# Patient Record
Sex: FEMALE | Race: WHITE
[De-identification: names, ages, dates, MRNs, and addresses within clinical notes are randomized per-mention and may not be internally consistent; named-entity substitution may affect disease eponyms.]

---

## 2017-05-05 ENCOUNTER — HOSPITAL ENCOUNTER (EMERGENCY)
Dept: HOSPITAL 8 - ED | Age: 52
Discharge: HOME | End: 2017-05-05
Payer: MEDICAID

## 2017-05-05 VITALS — WEIGHT: 293 LBS | BODY MASS INDEX: 53.92 KG/M2 | HEIGHT: 62 IN

## 2017-05-05 VITALS — DIASTOLIC BLOOD PRESSURE: 70 MMHG | SYSTOLIC BLOOD PRESSURE: 131 MMHG

## 2017-05-05 DIAGNOSIS — G51.0: Primary | ICD-10-CM

## 2017-05-05 DIAGNOSIS — K08.9: ICD-10-CM

## 2017-05-05 PROCEDURE — 99284 EMERGENCY DEPT VISIT MOD MDM: CPT

## 2017-08-29 ENCOUNTER — APPOINTMENT (OUTPATIENT)
Dept: OTHER | Facility: IMAGING CENTER | Age: 52
End: 2017-08-29

## 2017-08-29 PROCEDURE — 97530 THERAPEUTIC ACTIVITIES: CPT | Performed by: FAMILY MEDICINE

## 2017-09-12 ENCOUNTER — APPOINTMENT (OUTPATIENT)
Dept: OTHER | Facility: IMAGING CENTER | Age: 52
End: 2017-09-12

## 2017-09-12 PROCEDURE — 97530 THERAPEUTIC ACTIVITIES: CPT | Performed by: FAMILY MEDICINE

## 2017-11-16 ENCOUNTER — APPOINTMENT (OUTPATIENT)
Dept: OTHER | Facility: IMAGING CENTER | Age: 52
End: 2017-11-16

## 2017-11-16 PROCEDURE — 97530 THERAPEUTIC ACTIVITIES: CPT | Performed by: FAMILY MEDICINE

## 2017-12-04 ENCOUNTER — APPOINTMENT (OUTPATIENT)
Dept: OTHER | Facility: IMAGING CENTER | Age: 52
End: 2017-12-04

## 2017-12-04 PROCEDURE — 97530 THERAPEUTIC ACTIVITIES: CPT | Performed by: FAMILY MEDICINE

## 2017-12-11 ENCOUNTER — APPOINTMENT (OUTPATIENT)
Dept: OTHER | Facility: IMAGING CENTER | Age: 52
End: 2017-12-11

## 2017-12-11 PROCEDURE — 97530 THERAPEUTIC ACTIVITIES: CPT | Performed by: FAMILY MEDICINE

## 2018-01-08 ENCOUNTER — APPOINTMENT (OUTPATIENT)
Dept: OTHER | Facility: IMAGING CENTER | Age: 53
End: 2018-01-08

## 2018-01-08 PROCEDURE — 97530 THERAPEUTIC ACTIVITIES: CPT | Performed by: FAMILY MEDICINE

## 2018-01-24 ENCOUNTER — APPOINTMENT (OUTPATIENT)
Dept: OTHER | Facility: IMAGING CENTER | Age: 53
End: 2018-01-24

## 2018-02-08 ENCOUNTER — APPOINTMENT (RX ONLY)
Dept: URBAN - METROPOLITAN AREA CLINIC 31 | Facility: CLINIC | Age: 53
Setting detail: DERMATOLOGY
End: 2018-02-08

## 2018-02-08 DIAGNOSIS — D18.0 HEMANGIOMA: ICD-10-CM

## 2018-02-08 DIAGNOSIS — L82.1 OTHER SEBORRHEIC KERATOSIS: ICD-10-CM

## 2018-02-08 DIAGNOSIS — D22 MELANOCYTIC NEVI: ICD-10-CM

## 2018-02-08 DIAGNOSIS — D17 BENIGN LIPOMATOUS NEOPLASM: ICD-10-CM

## 2018-02-08 PROBLEM — D17.0 BENIGN LIPOMATOUS NEOPLASM OF SKIN AND SUBCUTANEOUS TISSUE OF HEAD, FACE AND NECK: Status: ACTIVE | Noted: 2018-02-08

## 2018-02-08 PROBLEM — D22.62 MELANOCYTIC NEVI OF LEFT UPPER LIMB, INCLUDING SHOULDER: Status: ACTIVE | Noted: 2018-02-08

## 2018-02-08 PROBLEM — D22.5 MELANOCYTIC NEVI OF TRUNK: Status: ACTIVE | Noted: 2018-02-08

## 2018-02-08 PROBLEM — D22.61 MELANOCYTIC NEVI OF RIGHT UPPER LIMB, INCLUDING SHOULDER: Status: ACTIVE | Noted: 2018-02-08

## 2018-02-08 PROBLEM — E13.9 OTHER SPECIFIED DIABETES MELLITUS WITHOUT COMPLICATIONS: Status: ACTIVE | Noted: 2018-02-08

## 2018-02-08 PROBLEM — D17.1 BENIGN LIPOMATOUS NEOPLASM OF SKIN AND SUBCUTANEOUS TISSUE OF TRUNK: Status: ACTIVE | Noted: 2018-02-08

## 2018-02-08 PROBLEM — D17.22 BENIGN LIPOMATOUS NEOPLASM OF SKIN AND SUBCUTANEOUS TISSUE OF LEFT ARM: Status: ACTIVE | Noted: 2018-02-08

## 2018-02-08 PROBLEM — D18.01 HEMANGIOMA OF SKIN AND SUBCUTANEOUS TISSUE: Status: ACTIVE | Noted: 2018-02-08

## 2018-02-08 PROCEDURE — 99243 OFF/OP CNSLTJ NEW/EST LOW 30: CPT

## 2018-02-08 PROCEDURE — ? COUNSELING

## 2018-02-08 ASSESSMENT — LOCATION DETAILED DESCRIPTION DERM
LOCATION DETAILED: RIGHT MEDIAL SUPERIOR CHEST
LOCATION DETAILED: RIGHT INFERIOR POSTERIOR NECK
LOCATION DETAILED: LEFT SUPERIOR PARIETAL SCALP
LOCATION DETAILED: LEFT MEDIAL INFERIOR CHEST
LOCATION DETAILED: RIGHT SUPERIOR MEDIAL LOWER BACK
LOCATION DETAILED: LEFT DISTAL POSTERIOR UPPER ARM
LOCATION DETAILED: LEFT ELBOW
LOCATION DETAILED: RIGHT MEDIAL INFERIOR CHEST
LOCATION DETAILED: LEFT SUPERIOR UPPER BACK
LOCATION DETAILED: RIGHT DISTAL POSTERIOR UPPER ARM

## 2018-02-08 ASSESSMENT — LOCATION SIMPLE DESCRIPTION DERM
LOCATION SIMPLE: LEFT ELBOW
LOCATION SIMPLE: SCALP
LOCATION SIMPLE: LEFT UPPER BACK
LOCATION SIMPLE: RIGHT LOWER BACK
LOCATION SIMPLE: RIGHT POSTERIOR UPPER ARM
LOCATION SIMPLE: LEFT POSTERIOR UPPER ARM
LOCATION SIMPLE: POSTERIOR NECK
LOCATION SIMPLE: CHEST

## 2018-02-08 ASSESSMENT — LOCATION ZONE DERM
LOCATION ZONE: SCALP
LOCATION ZONE: ARM
LOCATION ZONE: TRUNK
LOCATION ZONE: NECK

## 2018-02-22 ENCOUNTER — APPOINTMENT (OUTPATIENT)
Dept: OTHER | Facility: IMAGING CENTER | Age: 53
End: 2018-02-22

## 2020-07-16 ENCOUNTER — HOSPITAL ENCOUNTER (EMERGENCY)
Facility: MEDICAL CENTER | Age: 55
End: 2020-07-16
Attending: EMERGENCY MEDICINE
Payer: MEDICAID

## 2020-07-16 VITALS
BODY MASS INDEX: 53.92 KG/M2 | TEMPERATURE: 97 F | OXYGEN SATURATION: 92 % | DIASTOLIC BLOOD PRESSURE: 79 MMHG | WEIGHT: 293 LBS | HEART RATE: 98 BPM | HEIGHT: 62 IN | SYSTOLIC BLOOD PRESSURE: 136 MMHG | RESPIRATION RATE: 18 BRPM

## 2020-07-16 DIAGNOSIS — K42.9 UMBILICAL HERNIA WITHOUT OBSTRUCTION AND WITHOUT GANGRENE: ICD-10-CM

## 2020-07-16 PROCEDURE — 99284 EMERGENCY DEPT VISIT MOD MDM: CPT

## 2020-07-16 RX ORDER — GLIPIZIDE 5 MG/1
5 TABLET, FILM COATED, EXTENDED RELEASE ORAL DAILY
Status: SHIPPED | COMMUNITY
End: 2022-10-13

## 2020-07-16 RX ORDER — ACYCLOVIR 400 MG/1
400 TABLET ORAL 2 TIMES DAILY
COMMUNITY

## 2020-07-16 RX ORDER — ATORVASTATIN CALCIUM 20 MG/1
20 TABLET, FILM COATED ORAL NIGHTLY
Status: SHIPPED | COMMUNITY
End: 2021-10-05

## 2020-07-16 RX ORDER — MULTIVITAMIN WITH IRON
8000 TABLET ORAL DAILY
Status: SHIPPED | COMMUNITY
End: 2022-09-05

## 2020-07-16 SDOH — HEALTH STABILITY: MENTAL HEALTH: HOW OFTEN DO YOU HAVE A DRINK CONTAINING ALCOHOL?: MONTHLY OR LESS

## 2020-07-16 NOTE — ED PROVIDER NOTES
ED Provider Note    CHIEF COMPLAINT  Chief Complaint   Patient presents with   • Abdominal Pain   • Hernia       HPI  Jordyn Rick is a 54 y.o. female who presents to the emergency department with a history of morbid obesity.  She states she has been watching her diet and has lost 100 pounds.  She has a known umbilical hernia which she has seen a surgeon for in the past and was told she would need to have a gastric bypass in combination with the hernia repair as just repairing the hernia itself at this point would not be beneficial.  She states she has had this for years she has no abdominal pain or vomiting from it.  She does state that she has veins on her hernia and 1 of them broke open and started to bleed.  She states within the last several weeks she was tested for her liver which was unremarkable by her primary care physician and only diagnosis was fatty liver.  She put a Band-Aid on which stopped the bleeding but it still is oozing and she came into the ER for this reason    REVIEW OF SYSTEMS  Positive for abdominal hernia with bleeding vein, Negative for vomiting fever chills nausea and all other systems are negative  PAST MEDICAL HISTORY   has a past medical history of Bell's palsy, Diabetes (HCC), Gout, and Shingles of eyelid.    SOCIAL HISTORY  Social History     Tobacco Use   • Smoking status: Never Smoker   • Smokeless tobacco: Never Used   Substance and Sexual Activity   • Alcohol use: Yes     Frequency: Monthly or less   • Drug use: Never   • Sexual activity: Not on file       SURGICAL HISTORY  patient denies any surgical history    CURRENT MEDICATIONS  Reviewed.  See Encounter Summary.  Include   Home Medications    Medication Sig Taking? Last Dose Authorizing Provider   acyclovir (ZOVIRAX) 800 MG Tab Take 800 mg by mouth 2 times a day. Yes 7/15/2020 at [facility]   atorvastatin (LIPITOR) 20 MG Tab Take 20 mg by mouth every evening. Yes 7/12/2020 at [facility]  "  metformin (GLUCOPHAGE) 1000 MG tablet Take 1,000 mg by mouth 2 times a day, with meals. Yes 7/15/2020 at [facility]   glipiZIDE SR (GLUCOTROL) 5 MG TABLET SR 24 HR Take 5 mg by mouth every day. Yes 7/15/2020 at [facility]   Garlic 2000 MG Cap Take 1 Cap by mouth 2 Times a Day. Yes 7/12/2020 at [facility]   vitamin A 2400 mcg (8000 units) capsule Take 8,000 Units by mouth every day. Yes 7/15/2020 at [facility]   OIL OF OREGANO PO Take 1 Cap by mouth every day. Yes 7/15/2020 at [facility]         ALLERGIES  Allergies   Allergen Reactions   • Latex Rash     Rash         PHYSICAL EXAM  VITAL SIGNS: /76   Pulse (!) 102   Temp 36.1 °C (97 °F) (Temporal)   Resp 18   Ht 1.575 m (5' 2\")   Wt (!) 139.8 kg (308 lb 3.3 oz)   SpO2 92%   BMI 56.37 kg/m²   Constitutional:  Alert in no apparent distress.  HENT: Normocephalic, Bilateral external ears normal. Nose normal.   Eyes: Pupils are equal and reactive. Conjunctiva normal, non-icteric.   Thorax & Lungs: Easy unlabored respirations  Abdomen:  No gross signs of peritonitis, no pain with movement, patient has a very large umbilical hernia, it is nontender, she does have 1 area of an oozing vein on the skin of the hernia skin: Visualized skin is  Dry, No erythema, No rash.   Extremities:   No edema, No asymmetry  Neurologic: Alert, Grossly non-focal.   Psychiatric: Affect and Mood normal      COURSE & MEDICAL DECISION MAKING  Nursing notes and vital signs were reviewed. (See chart for details)    The patient presents to the Emergency Department with *bleeding vein which is over her umbilical hernia.  I discussed the patient's care with Dr. Wan who is a surgeon on-call today.  He states that he will see her in follow-up although it is unlikely surgery will be beneficial in this patient at this time due to her weight.  Without further weight reduction it would be unlikely a successful surgery.  " He agreed with my thought of giving her Surgicel and placing a tight Band-Aid over the vein.  He was concerned she may have underlying liver disease which I discussed with her she will not be tested here in the ER as she assures me she has been tested for her liver recently by her primary care physician I do not have these records.  She will follow-up with her primary care and Dr. Benito      ISPOSITION:  Patient will be discharged home in stable condition.    FOLLOW UP:  ENID Huffman  [address]   [address]  [place] [address]  [phone]          Michael Wan M.D.  [address] [facility] [address]  [place] [address]  [phone]        OUTPATIENT MEDICATIONS:  Discharge Medication List as of 7/16/2020  8:51 AM        FINAL IMPRESSION  1. Bleeding vein  2. Umbilical hernia    Electronically signed by: Gayle Foster M.D., 7/16/2020 8:37 AM

## 2020-07-16 NOTE — ED NOTES
Med Rec completed per patient with medication list (returned)   Allergies reviewed  No ORAL antibiotics in last 14 days

## 2021-03-29 ENCOUNTER — HOSPITAL ENCOUNTER (OUTPATIENT)
Dept: HOSPITAL 8 - CFH | Age: 56
Discharge: HOME | End: 2021-03-29
Attending: SURGERY
Payer: MEDICAID

## 2021-03-29 DIAGNOSIS — M51.36: ICD-10-CM

## 2021-03-29 DIAGNOSIS — K76.0: Primary | ICD-10-CM

## 2021-03-29 DIAGNOSIS — L76.22: ICD-10-CM

## 2021-03-29 DIAGNOSIS — K43.9: ICD-10-CM

## 2021-03-29 PROCEDURE — 74177 CT ABD & PELVIS W/CONTRAST: CPT

## 2021-03-29 PROCEDURE — 82565 ASSAY OF CREATININE: CPT

## 2021-05-08 ENCOUNTER — APPOINTMENT (OUTPATIENT)
Dept: RADIOLOGY | Facility: MEDICAL CENTER | Age: 56
End: 2021-05-08
Attending: EMERGENCY MEDICINE
Payer: MEDICAID

## 2021-05-08 ENCOUNTER — HOSPITAL ENCOUNTER (EMERGENCY)
Facility: MEDICAL CENTER | Age: 56
End: 2021-05-09
Attending: EMERGENCY MEDICINE
Payer: MEDICAID

## 2021-05-08 DIAGNOSIS — I83.899 BLEEDING FROM VARICOSE VEIN: ICD-10-CM

## 2021-05-08 DIAGNOSIS — K42.9 UMBILICAL HERNIA WITHOUT OBSTRUCTION AND WITHOUT GANGRENE: ICD-10-CM

## 2021-05-08 LAB
ABO GROUP BLD: NORMAL
ALBUMIN SERPL BCP-MCNC: 4.1 G/DL (ref 3.2–4.9)
ALBUMIN/GLOB SERPL: 1.3 G/DL
ALP SERPL-CCNC: 65 U/L (ref 30–99)
ALT SERPL-CCNC: 22 U/L (ref 2–50)
ANION GAP SERPL CALC-SCNC: 13 MMOL/L (ref 7–16)
APTT PPP: 30.4 SEC (ref 24.7–36)
AST SERPL-CCNC: 17 U/L (ref 12–45)
BASOPHILS # BLD AUTO: 0.2 % (ref 0–1.8)
BASOPHILS # BLD: 0.02 K/UL (ref 0–0.12)
BILIRUB SERPL-MCNC: 0.4 MG/DL (ref 0.1–1.5)
BLD GP AB SCN SERPL QL: NORMAL
BUN SERPL-MCNC: 10 MG/DL (ref 8–22)
CALCIUM SERPL-MCNC: 8.9 MG/DL (ref 8.4–10.2)
CHLORIDE SERPL-SCNC: 102 MMOL/L (ref 96–112)
CO2 SERPL-SCNC: 19 MMOL/L (ref 20–33)
CREAT SERPL-MCNC: 0.44 MG/DL (ref 0.5–1.4)
EOSINOPHIL # BLD AUTO: 0.09 K/UL (ref 0–0.51)
EOSINOPHIL NFR BLD: 0.8 % (ref 0–6.9)
ERYTHROCYTE [DISTWIDTH] IN BLOOD BY AUTOMATED COUNT: 47.7 FL (ref 35.9–50)
GLOBULIN SER CALC-MCNC: 3.2 G/DL (ref 1.9–3.5)
GLUCOSE SERPL-MCNC: 226 MG/DL (ref 65–99)
HCT VFR BLD AUTO: 34.5 % (ref 37–47)
HGB BLD-MCNC: 10.4 G/DL (ref 12–16)
IMM GRANULOCYTES # BLD AUTO: 0.06 K/UL (ref 0–0.11)
IMM GRANULOCYTES NFR BLD AUTO: 0.6 % (ref 0–0.9)
INR PPP: 1.07 (ref 0.87–1.13)
LACTATE BLD-SCNC: 2.9 MMOL/L (ref 0.5–2)
LYMPHOCYTES # BLD AUTO: 1.52 K/UL (ref 1–4.8)
LYMPHOCYTES NFR BLD: 14 % (ref 22–41)
MCH RBC QN AUTO: 23.5 PG (ref 27–33)
MCHC RBC AUTO-ENTMCNC: 30.1 G/DL (ref 33.6–35)
MCV RBC AUTO: 78.1 FL (ref 81.4–97.8)
MONOCYTES # BLD AUTO: 0.47 K/UL (ref 0–0.85)
MONOCYTES NFR BLD AUTO: 4.3 % (ref 0–13.4)
NEUTROPHILS # BLD AUTO: 8.69 K/UL (ref 2–7.15)
NEUTROPHILS NFR BLD: 80.1 % (ref 44–72)
NRBC # BLD AUTO: 0 K/UL
NRBC BLD-RTO: 0 /100 WBC
PLATELET # BLD AUTO: 348 K/UL (ref 164–446)
PMV BLD AUTO: 9.3 FL (ref 9–12.9)
POTASSIUM SERPL-SCNC: 4 MMOL/L (ref 3.6–5.5)
PROT SERPL-MCNC: 7.3 G/DL (ref 6–8.2)
PROTHROMBIN TIME: 13.6 SEC (ref 12–14.6)
RBC # BLD AUTO: 4.42 M/UL (ref 4.2–5.4)
RH BLD: NORMAL
SARS-COV+SARS-COV-2 AG RESP QL IA.RAPID: NOTDETECTED
SODIUM SERPL-SCNC: 134 MMOL/L (ref 135–145)
SPECIMEN SOURCE: NORMAL
WBC # BLD AUTO: 10.9 K/UL (ref 4.8–10.8)

## 2021-05-08 PROCEDURE — 86850 RBC ANTIBODY SCREEN: CPT

## 2021-05-08 PROCEDURE — 74177 CT ABD & PELVIS W/CONTRAST: CPT

## 2021-05-08 PROCEDURE — 94760 N-INVAS EAR/PLS OXIMETRY 1: CPT

## 2021-05-08 PROCEDURE — 700117 HCHG RX CONTRAST REV CODE 255: Performed by: EMERGENCY MEDICINE

## 2021-05-08 PROCEDURE — 86900 BLOOD TYPING SEROLOGIC ABO: CPT

## 2021-05-08 PROCEDURE — 96374 THER/PROPH/DIAG INJ IV PUSH: CPT | Mod: XU

## 2021-05-08 PROCEDURE — 85610 PROTHROMBIN TIME: CPT

## 2021-05-08 PROCEDURE — U0003 INFECTIOUS AGENT DETECTION BY NUCLEIC ACID (DNA OR RNA); SEVERE ACUTE RESPIRATORY SYNDROME CORONAVIRUS 2 (SARS-COV-2) (CORONAVIRUS DISEASE [COVID-19]), AMPLIFIED PROBE TECHNIQUE, MAKING USE OF HIGH THROUGHPUT TECHNOLOGIES AS DESCRIBED BY CMS-2020-01-R: HCPCS

## 2021-05-08 PROCEDURE — 80053 COMPREHEN METABOLIC PANEL: CPT

## 2021-05-08 PROCEDURE — 86901 BLOOD TYPING SEROLOGIC RH(D): CPT

## 2021-05-08 PROCEDURE — 700111 HCHG RX REV CODE 636 W/ 250 OVERRIDE (IP): Performed by: EMERGENCY MEDICINE

## 2021-05-08 PROCEDURE — C9803 HOPD COVID-19 SPEC COLLECT: HCPCS | Performed by: EMERGENCY MEDICINE

## 2021-05-08 PROCEDURE — 85025 COMPLETE CBC W/AUTO DIFF WBC: CPT

## 2021-05-08 PROCEDURE — 99284 EMERGENCY DEPT VISIT MOD MDM: CPT

## 2021-05-08 PROCEDURE — U0005 INFEC AGEN DETEC AMPLI PROBE: HCPCS

## 2021-05-08 PROCEDURE — 85730 THROMBOPLASTIN TIME PARTIAL: CPT

## 2021-05-08 PROCEDURE — 36415 COLL VENOUS BLD VENIPUNCTURE: CPT

## 2021-05-08 PROCEDURE — 83605 ASSAY OF LACTIC ACID: CPT

## 2021-05-08 PROCEDURE — 87426 SARSCOV CORONAVIRUS AG IA: CPT

## 2021-05-08 RX ORDER — MORPHINE SULFATE 4 MG/ML
4 INJECTION, SOLUTION INTRAMUSCULAR; INTRAVENOUS ONCE
Status: COMPLETED | OUTPATIENT
Start: 2021-05-08 | End: 2021-05-08

## 2021-05-08 RX ADMIN — IOHEXOL 100 ML: 350 INJECTION, SOLUTION INTRAVENOUS at 23:24

## 2021-05-08 RX ADMIN — MORPHINE SULFATE 4 MG: 4 INJECTION INTRAVENOUS at 22:38

## 2021-05-09 VITALS
HEART RATE: 84 BPM | RESPIRATION RATE: 16 BRPM | WEIGHT: 293 LBS | BODY MASS INDEX: 53.92 KG/M2 | HEIGHT: 62 IN | OXYGEN SATURATION: 99 % | SYSTOLIC BLOOD PRESSURE: 112 MMHG | TEMPERATURE: 97 F | DIASTOLIC BLOOD PRESSURE: 67 MMHG

## 2021-05-09 LAB
LACTATE BLD-SCNC: 1.5 MMOL/L (ref 0.5–2)
SARS-COV-2 RNA RESP QL NAA+PROBE: NOTDETECTED
SPECIMEN SOURCE: NORMAL

## 2021-05-09 PROCEDURE — 83605 ASSAY OF LACTIC ACID: CPT

## 2021-05-09 NOTE — ED NOTES
Bleeding is now controlled. A clot is noted to area that has been bleeding. Dressing that was in place has a significant amount of bright red blood and clots.

## 2021-05-09 NOTE — ED NOTES
"Pt presents to ER c/o:  Chief Complaint   Patient presents with   • Abdominal Pain     States she has been bleeding from her hernia for the past 24 hrs. States she is able to control it but hasn't been able to. Reports fatigue, dizziness     /67   Pulse (!) 117   Temp 36.1 °C (97 °F) (Temporal)   Resp 20   Ht 1.575 m (5' 2\")   Wt (!) 143 kg (315 lb 4.1 oz)   SpO2 96%   BMI 57.66 kg/m²     "

## 2021-05-09 NOTE — ED PROVIDER NOTES
ED Provider Note    Chief Complaint:   Venous bleeding    HPI:  Jordyn Rick is a very pleasant 55-year-old woman who presents to the emergency department for evaluation of bleeding of a vein overlying a large umbilical hernia, as well as pain localized to her chronic umbilical hernia.  She reports that the hernia is really painful, however has been painful for about the last 12 to 24 hours.  Additionally, she had an episode of bleeding from one of the overlying veins, reports that this is happened previously. She has previously been evaluated by bariatric surgeon, who stated that the hernia is not amenable to elective repair without weight loss, and concurrent bariatric surgery.  She is in the process of lifestyle change, and trying to lose weight.  She reports that the veins overlying the hernia bleed frequently, typically she is able to use pressure and Band-Aids to get the bleeding to stop.  However, she had some persistent bleeding.  She also reports some pain localized to the area of the hernia that has been ongoing throughout the day today.  She states that when the intestine and the hernia fills with gas she does have associated pain, though this always resolves without intervention on its own.  She has not had any associated vomiting, no associated constipation, no abdominal distention.  She is not had any fevers.  She has had no lightheadedness, no shortness of breath.  She was seen once in this emergency department previously for similar symptoms.  She is unable to identify any exacerbating or alleviating factors.    Review of Systems:  See HPI for pertinent positives and negatives. All other systems negative.    Past Medical History:   has a past medical history of Bell's palsy, Diabetes (HCC), Gout, and Shingles of eyelid.    Social History:  Social History     Tobacco Use   • Smoking status: Never Smoker   • Smokeless tobacco: Never Used   Substance and Sexual Activity   • Alcohol use: Yes   • Drug  "use: Never   • Sexual activity: Not on file       Surgical History:  patient denies any surgical history    Current Medications:  Home Medications     Reviewed by Janie Arnett R.N. (Registered Nurse) on 05/08/21 at 2125  Med List Status: Not Addressed   Medication Last Dose Status   acyclovir (ZOVIRAX) 800 MG Tab  Active   atorvastatin (LIPITOR) 20 MG Tab  Active   Garlic 2000 MG Cap  Active   glipiZIDE SR (GLUCOTROL) 5 MG TABLET SR 24 HR  Active   metformin (GLUCOPHAGE) 1000 MG tablet  Active   OIL OF OREGANO PO  Active   vitamin A 2400 mcg (8000 units) capsule  Active                Allergies:  Allergies   Allergen Reactions   • Latex Rash     Rash         Physical Exam:  Vital Signs: /67   Pulse 87   Temp 36.1 °C (97 °F) (Temporal)   Resp (!) 25   Ht 1.575 m (5' 2\")   Wt (!) 143 kg (315 lb 4.1 oz)   SpO2 98%   BMI 57.66 kg/m²   Constitutional: Alert, no acute distress  HENT: Normocephalic, mask in place  Eyes: Pupils equal and reactive, normal conjunctiva  Neck: Supple, normal range of motion, no stridor  Cardiovascular: Extremities are warm and well perfused, no murmur appreciated, normal cardiac auscultation  Pulmonary: No respiratory distress, normal work of breathing, no accessory muscule usage, breath sounds clear and equal bilaterally  Abdomen: Soft, nondistended, large umbilical hernia present that is tender to palpation, not readily reducible at baseline small area of clotted blood in the area of previous bleeding overlying varicose vein, no active bleeding at this time  Skin: Warm, dry, no rashes or lesions  Musculoskeletal: Normal range of motion in all extremities, no swelling or deformity noted  Neurologic: Alert, oriented, normal speech, normal motor function  Psychiatric: Normal and appropriate mood and affect    Medical records reviewed for continuity of care. Ms. Rick was seen in the emergency department 7/16/2020.  She is a history of a chronic umbilical hernia, she is " previously seen a surgeon he said that this could not be repaired without gastric bypass performed concurrently.  She was seen because one of the veins overlying the hernia broke open and started to bleed.  Surgicel and tight Band-Aid was used to control the bleeding.  She was discharged home in stable condition.  Case was discussed with Dr. Wan who agreed to see the patient on outpatient basis for follow-up.    Labs:  Labs Reviewed   CBC WITH DIFFERENTIAL - Abnormal; Notable for the following components:       Result Value    WBC 10.9 (*)     Hemoglobin 10.4 (*)     Hematocrit 34.5 (*)     MCV 78.1 (*)     MCH 23.5 (*)     MCHC 30.1 (*)     Neutrophils-Polys 80.10 (*)     Lymphocytes 14.00 (*)     Neutrophils (Absolute) 8.69 (*)     All other components within normal limits    Narrative:     Indicate which anticoagulants the patient is on:->UNKNOWN   COMP METABOLIC PANEL - Abnormal; Notable for the following components:    Sodium 134 (*)     Co2 19 (*)     Glucose 226 (*)     Creatinine 0.44 (*)     All other components within normal limits    Narrative:     Indicate which anticoagulants the patient is on:->UNKNOWN   LACTIC ACID - Abnormal; Notable for the following components:    Lactic Acid 2.9 (*)     All other components within normal limits    Narrative:     Indicate which anticoagulants the patient is on:->UNKNOWN   COD (ADULT)   PROTHROMBIN TIME    Narrative:     Indicate which anticoagulants the patient is on:->UNKNOWN   APTT    Narrative:     Indicate which anticoagulants the patient is on:->UNKNOWN   SARS-COV ANTIGEN NIMESH    Narrative:     Have you been in close contact with a person who is suspected  or known to be positive for COVID-19 within the last 30 days  (e.g. last seen that person < 30 days ago)->No   SARS-COV-2, PCR (IN-HOUSE)    Narrative:     Have you been in close contact with a person who is suspected  or known to be positive for COVID-19 within the last 30 days  (e.g. last seen that person  < 30 days ago)->No   LACTIC ACID   ESTIMATED GFR    Narrative:     Indicate which anticoagulants the patient is on:->UNKNOWN   ABO RH CONFIRM       Radiology:  CT-ABDOMEN-PELVIS WITH   Final Result         1. Large 14.6 x 15.6 cm umbilical hernia containing fat and transverse colon. The hernia neck measures 4.4 cm. Relatively decompressed distal colon compared to proximal colon is suspicious for early or partial obstruction. Currently, no dilatation of the    proximal colon and small bowel.      2. Hepatic steatosis.              ED Medications Administered:  Medications   morphine (pf) 4 mg/mL injection 4 mg (4 mg Intravenous Given 5/8/21 2238)   iohexol (OMNIPAQUE) 350 mg/mL (100 mL Intravenous Given 5/8/21 2324)       Differential diagnosis:  Symptomatic anemia, bleeding varicose vein, incarcerated or strangulated hernia    MDM:  Ms. Rick resents to the emergency department today for evaluation of a bleeding varicose vein.  Fortunately, bleeding stopped on arrival to the emergency department.  On my initial physical examination, the hernia was somewhat firm and tender to palpation, raising concern for incarceration or strangulation.  Hernia is not readily reducible at baseline.  On arrival to the emergency department she did have mild tachycardia, which was treated with fluid bolus.  She is afebrile, has no hypotension.  Sepsis protocol initiated due to vital sign abnormalities.    On laboratory evaluation she has a slightly elevated lactic acid to 2.9.  IV fluid bolus given in accordance with sepsis protocol.  On reassessment after receiving IV fluids, lactic acid is normalized.  COVID-19 antigen testing is negative.  She has no significant laboratory abnormalities on CMP, sodium is slightly below normal at 134, this should improve with IV saline given in the emergency department.  Glucose is mildly elevated to 226.  White blood count is just above normal reference range at 10.9, hemoglobin is 10.4 without  prior values available for comparison.  She has a normal platelet count, normal liver enzymes, no evidence of coagulopathy.    CT abdomen pelvis ordered out of concern for incarcerated or strangulated hernia.  Relatively decompressed distal colon compared to proximal colon suspicious for early or partial obstruction noted.    She is initially given a dose of morphine.  I did multiple repeat abdominal exams, and found that the umbilical hernia became softer and was no longer tender, consistent with Ms. Rick's history of previous similar symptoms.  She is not having any constipation, no vomiting.  Doubt high-grade bowel obstruction.  On my reassessment, she remains pain-free with a benign abdominal exam.  She has not had any further episodes of venous bleeding in the emergency department.    Plan at this time is for discharge home.  She is currently established with Dr. Wan, general surgeon, with plans to repair the hernia after lifestyle change and weight loss.  I did provide her with a small amount of Surgifoam that she can use if the bleeding starts again, she is counseled to remove this after the bleeding is stopped to reduce the risk of infection.  She will follow-up with her primary care physician for abdominal recheck within 24 hours. Return precautions were discussed with the patient, and provided in written form with the patient's discharge instructions.     Personal protective equipment including N95 surgical respirator, goggles, and gloves were used during this encounter.       Disposition:  Discharge home in stable condition    Final Impression:  1. Bleeding from varicose vein    2. Umbilical hernia without obstruction and without gangrene        Electronically signed by: Haley Velázquez MD, 5/9/2021 2:04 AM

## 2021-05-09 NOTE — ED NOTES
Pt provided with discharge paper work and follow up instructions. Pt declines any questions at this time. Pt to ambulate out of ER with daughter.

## 2021-05-09 NOTE — DISCHARGE INSTRUCTIONS
Please follow-up with your primary care physician for recheck of the bleeding area in 24 hours.  If it continues to bleed, you may put some of the Surgifoam on the bleeding site.  Please remove the Surgifoam when the bleeding stops, to reduce the risk of infection.  Return to the emergency department if you develop any new or worsening symptoms including recurrent bleeding, worsening bleeding, recurrent abdominal pain, constipation, vomiting, or any further concerns.  Additionally, please return for recheck within 24 hours if your symptoms do not continue to improve, and you cannot follow-up with primary care.

## 2021-06-25 ENCOUNTER — TELEPHONE (OUTPATIENT)
Dept: CARDIOLOGY | Facility: MEDICAL CENTER | Age: 56
End: 2021-06-25

## 2021-06-25 NOTE — TELEPHONE ENCOUNTER
Spoke to pt in regards to obtaining records for NP appointment with [facility]. Per patient has never been treated by a previous cardiologist. Confirmed all recent notes and labs are in Epic. Pt stated has not had any previous cardiac imaging done.     Confirmed with patient appointment date, time, and location.

## 2021-08-18 NOTE — PROGRESS NOTES
Cardiology Initial Consultation Note    Date of note:    8/19/2021    Primary Care Provider: Swapna Ardon M.D.  Referring Provider: Swapna Arodn M.D.      Patient Name: Jordyn Rick     YOB: 1965  MRN:              [id number]    Chief Complaint: Palpitations    History of Present Illness: Ms. Jordyn Rick is a 55 y.o. female whose current medical problems include type II diabetes, and Bell's palsy who is here for cardiac consultation for palpitations.    The patient was last seen in the ER on 5/8/2021 for umbilical hernia.      Cardiovascular Risk Factors:  1. Smoking status: ***  2. Type II Diabetes Mellitus: *** No results found for: HBA1C  3. Hypertension: ***  4. Dyslipidemia: *** No results found for: CHOLSTRLTOT, LDL, HDL, TRIGLYCERIDE  5. Family history of early Coronary Artery Disease in a first degree relative (Male less than 55 years of age; Female less than 65 years of age): ***  6.  Obesity and/or Metabolic Syndrome: ***  7. Sedentary lifestyle: ***    ROS      All other systems reviewed and are negative.       Past Medical History:   Diagnosis Date   • Bell's palsy    • Diabetes (HCC)     Type II   • Gout    • Shingles of eyelid          No past surgical history on file.      Current Outpatient Medications   Medication Sig Dispense Refill   • acyclovir (ZOVIRAX) 800 MG Tab Take 800 mg by mouth 2 times a day.     • atorvastatin (LIPITOR) 20 MG Tab Take 20 mg by mouth every evening.     • metformin (GLUCOPHAGE) 1000 MG tablet Take 1,000 mg by mouth 2 times a day, with meals.     • glipiZIDE SR (GLUCOTROL) 5 MG TABLET SR 24 HR Take 5 mg by mouth every day.     • Garlic 2000 MG Cap Take 1 Cap by mouth 2 Times a Day.     • vitamin A 2400 mcg (8000 units) capsule Take 8,000 Units by mouth every day.     • OIL OF OREGANO PO Take 1 Cap by mouth every day.       No current facility-administered medications for this visit.         Allergies   Allergen Reactions   • Latex Rash     Rash            No family history on file.      Social History     Socioeconomic History   • Marital status: Single     Spouse name: Not on file   • Number of children: Not on file   • Years of education: Not on file   • Highest education level: Not on file   Occupational History   • Not on file   Tobacco Use   • Smoking status: Never Smoker   • Smokeless tobacco: Never Used   Substance and Sexual Activity   • Alcohol use: Yes   • Drug use: Never   • Sexual activity: Not on file   Other Topics Concern   • Not on file   Social History Narrative   • Not on file     Social Determinants of Health     Financial Resource Strain:    • Difficulty of Paying Living Expenses:    Food Insecurity:    • Worried About Running Out of Food in the Last Year:    • Ran Out of Food in the Last Year:    Transportation Needs:    • Lack of Transportation (Medical):    • Lack of Transportation (Non-Medical):    Physical Activity:    • Days of Exercise per Week:    • Minutes of Exercise per Session:    Stress:    • Feeling of Stress :    Social Connections:    • Frequency of Communication with Friends and Family:    • Frequency of Social Gatherings with Friends and Family:    • Attends Orthodoxy Services:    • Active Member of Clubs or Organizations:    • Attends Club or Organization Meetings:    • Marital Status:    Intimate Partner Violence:    • Fear of Current or Ex-Partner:    • Emotionally Abused:    • Physically Abused:    • Sexually Abused:          Physical Exam:  Ambulatory Vitals  There were no vitals taken for this visit.   Oxygen Therapy:     BP Readings from Last 4 Encounters:   05/09/21 112/67   07/16/20 136/79       Weight/BMI: There is no height or weight on file to calculate BMI.  Wt Readings from Last 4 Encounters:   05/08/21 (!) 143 kg (315 lb 4.1 oz)   07/16/20 (!) 140 kg (308 lb 3.3 oz)         General: Well appearing and in no apparent distress  Eyes: ***nl conjunctiva, no icteric sclera  ENT: wearing a mask***, normal  external appearance of ears  Neck: ***no visible JVP, *** no carotid bruits  Lungs: normal respiratory effort, CTAB  Heart: ***RRR, ***no murmurs, no rubs or gallops, *** no edema bilateral lower extremities. No LV/RV heave on cardiac palpatation. ***+ bilateral radial pulses.  ***+ bilateral dp pulses.   Abdomen: soft, non tender, non distended, no masses, normal bowel sounds.  No HSM.  Extremities/MSK: no clubbing, no cyanosis  Neurological: No focal sensory deficits  Psychiatric: Appropriate affect, A/O x 3, intact judgement and insight  Skin: Warm extremities      Lab Data Review:  No results found for: CHOLSTRLTOT, LDL, HDL, TRIGLYCERIDE    Lab Results   Component Value Date/Time    SODIUM 134 (L) 05/08/2021 09:32 PM    POTASSIUM 4.0 05/08/2021 09:32 PM    CHLORIDE 102 05/08/2021 09:32 PM    CO2 19 (L) 05/08/2021 09:32 PM    GLUCOSE 226 (H) 05/08/2021 09:32 PM    BUN 10 05/08/2021 09:32 PM    CREATININE 0.44 (L) 05/08/2021 09:32 PM     Lab Results   Component Value Date/Time    ALKPHOSPHAT 65 05/08/2021 09:32 PM    ASTSGOT 17 05/08/2021 09:32 PM    ALTSGPT 22 05/08/2021 09:32 PM    TBILIRUBIN 0.4 05/08/2021 09:32 PM      Lab Results   Component Value Date/Time    WBC 10.9 (H) 05/08/2021 09:32 PM     No results found for: HBA1C      Cardiac Imaging and Procedures Review:    EKG dated ***: My personal interpretation is ***    No prior echocardiogram      Assessment & Plan     No diagnosis found.      Shared Medical Decision Making:  Palpitations    All of *** excellent questions were answered to the best of my knowledge and to *** satisfaction.  It was a pleasure seeing Ms. Jordyn Rick in my clinic today. No follow-ups on file. Patient is aware to call the cardiology clinic with any questions or concerns.      Yang Varela MD  [facility]  [facility], [facility]  [address], [address] [address]  Phone: [phone]  Fax: [phone]

## 2021-08-19 ENCOUNTER — APPOINTMENT (OUTPATIENT)
Dept: CARDIOLOGY | Facility: MEDICAL CENTER | Age: 56
End: 2021-08-19
Payer: MEDICAID

## 2021-10-05 ENCOUNTER — OFFICE VISIT (OUTPATIENT)
Dept: CARDIOLOGY | Facility: MEDICAL CENTER | Age: 56
End: 2021-10-05
Payer: MEDICAID

## 2021-10-05 VITALS
RESPIRATION RATE: 20 BRPM | HEART RATE: 91 BPM | HEIGHT: 61 IN | SYSTOLIC BLOOD PRESSURE: 142 MMHG | BODY MASS INDEX: 55.32 KG/M2 | DIASTOLIC BLOOD PRESSURE: 78 MMHG | WEIGHT: 293 LBS | OXYGEN SATURATION: 96 %

## 2021-10-05 DIAGNOSIS — Z01.810 PREOP CARDIOVASCULAR EXAM: ICD-10-CM

## 2021-10-05 DIAGNOSIS — R00.2 PALPITATIONS: Primary | ICD-10-CM

## 2021-10-05 DIAGNOSIS — E78.5 DYSLIPIDEMIA: ICD-10-CM

## 2021-10-05 DIAGNOSIS — R06.09 DYSPNEA ON EXERTION: ICD-10-CM

## 2021-10-05 PROCEDURE — 99244 OFF/OP CNSLTJ NEW/EST MOD 40: CPT | Performed by: STUDENT IN AN ORGANIZED HEALTH CARE EDUCATION/TRAINING PROGRAM

## 2021-10-05 RX ORDER — ACYCLOVIR 400 MG/1
TABLET ORAL
COMMUNITY
Start: 2021-09-11 | End: 2021-10-05

## 2021-10-05 RX ORDER — LANCETS 33 GAUGE
EACH MISCELLANEOUS
COMMUNITY
Start: 2021-07-14 | End: 2022-09-05

## 2021-10-05 RX ORDER — CLOTRIMAZOLE 1 %
CREAM (GRAM) TOPICAL
COMMUNITY
Start: 2021-07-21 | End: 2022-09-05

## 2021-10-05 RX ORDER — BLOOD SUGAR DIAGNOSTIC
STRIP MISCELLANEOUS
COMMUNITY
Start: 2021-09-16 | End: 2022-09-05

## 2021-10-05 RX ORDER — GLIPIZIDE 5 MG/1
TABLET ORAL
COMMUNITY
Start: 2021-09-30 | End: 2021-10-05

## 2021-10-05 ASSESSMENT — ENCOUNTER SYMPTOMS
WHEEZING: 0
NEAR-SYNCOPE: 0
DYSPNEA ON EXERTION: 1
WEAKNESS: 0
COUGH: 0
SHORTNESS OF BREATH: 0
PALPITATIONS: 0
PND: 0
IRREGULAR HEARTBEAT: 0
NIGHT SWEATS: 0
NAUSEA: 0
FOCAL WEAKNESS: 0
ABDOMINAL PAIN: 0
FEVER: 0
DIARRHEA: 0
ORTHOPNEA: 0
VOMITING: 0
DIZZINESS: 0
SYNCOPE: 0

## 2021-10-05 ASSESSMENT — FIBROSIS 4 INDEX: FIB4 SCORE: 0.57

## 2021-10-05 NOTE — PROGRESS NOTES
Cardiology Initial Consultation Note    Date of note:    10/5/2021    Primary Care Provider: Swapna Ardon M.D.  Referring Provider: Swapna Ardon M.D.    Patient Name: Jordyn Rick     YOB: 1965  MRN:              [id number]    Chief Complaint: Palpitations and dyspnea on exertion    History of Present Illness: Ms. Jordyn Rick is a 56 y.o. female whose current medical problems include diabetes, dyslipidemia, and family history of early CAD who is here for cardiac consultation for palpitations, dyspnea on exertion, and preop evaluation.    The patient was last seen by her primary care physician in April 2021.  She is currently undergoing evaluation by general surgery for ventral hernia repair, pending weight loss. She was referred to cardiology due to complaints of palpitations and dyspnea on exertion.    The patient reports that she has shortness of breath with walking.  She reports associated palpitations.  She denies any chest pain.  No orthopnea, PND, or leg swelling.  She has been working on weight loss with diet in order to undergo ventral hernia repair (she was told to lose 100 pounds).  She denies any syncope or presyncopal episodes.    Cardiovascular Risk Factors:  1. Smoking status: Never smoker  2. Type II Diabetes Mellitus: On medication   3. Hypertension: None  4. Dyslipidemia: On statin  5. Family history of early Coronary Artery Disease in a first degree relative (Male less than 55 years of age; Female less than 65 years of age): Father with MI in 56  6.  Obesity and/or Metabolic Syndrome: BMI 58.76  7. Sedentary lifestyle: Not active but not sedentary    Review of Systems   Constitutional: Negative for fever, malaise/fatigue and night sweats.   Cardiovascular: Positive for dyspnea on exertion. Negative for chest pain, irregular heartbeat, leg swelling, near-syncope, orthopnea, palpitations, paroxysmal nocturnal dyspnea and syncope.   Respiratory: Negative for cough, shortness of  "breath and wheezing.    Gastrointestinal: Negative for abdominal pain, diarrhea, nausea and vomiting.   Neurological: Negative for dizziness, focal weakness and weakness.       All other systems reviewed and are negative.       Current Outpatient Medications   Medication Sig Dispense Refill   • Lancets (ONETOUCH DELICA PLUS [id number]) Misc      • ONETOUCH ULTRA strip      • clotrimazole (LOTRIMIN) 1 % Cream      • acyclovir (ZOVIRAX) 800 MG Tab Take 800 mg by mouth 2 times a day.     • metformin (GLUCOPHAGE) 1000 MG tablet Take 1,000 mg by mouth 2 times a day, with meals.     • glipiZIDE SR (GLUCOTROL) 5 MG TABLET SR 24 HR Take 5 mg by mouth every day.     • Garlic 2000 MG Cap Take 1 Cap by mouth 2 Times a Day.     • vitamin A 2400 mcg (8000 units) capsule Take 8,000 Units by mouth every day.     • OIL OF OREGANO PO Take 1 Cap by mouth every day.       No current facility-administered medications for this visit.         Allergies   Allergen Reactions   • Latex Rash     Rash         Physical Exam:  Ambulatory Vitals  /78 (BP Location: Left arm, Patient Position: Sitting, BP Cuff Size: Adult)   Pulse 91   Resp 20   Ht 1.549 m (5' 1\")   Wt (!) 141 kg (311 lb)   SpO2 96%    Oxygen Therapy:  Pulse Oximetry: 96 %  BP Readings from Last 4 Encounters:   10/05/21 142/78   05/09/21 112/67   07/16/20 136/79       Weight/BMI: Body mass index is 58.76 kg/m².  Wt Readings from Last 4 Encounters:   10/05/21 (!) 141 kg (311 lb)   05/08/21 (!) 143 kg (315 lb 4.1 oz)   07/16/20 (!) 140 kg (308 lb 3.3 oz)       General: Well appearing, obese, and in no apparent distress  Eyes: nl conjunctiva, no icteric sclera  ENT: wearing a mask, normal external appearance of ears  Neck: no visible JVP,  no carotid bruits  Lungs: normal respiratory effort, CTAB  Heart: RRR, no murmurs, no rubs or gallops,  no edema bilateral lower extremities. No LV/RV heave on cardiac palpatation. + bilateral radial pulses.  + bilateral dp pulses. "   Abdomen: soft, non tender, non distended, no masses, normal bowel sounds.  No HSM.  Extremities/MSK: no clubbing, no cyanosis  Neurological: No focal sensory deficits  Psychiatric: Appropriate affect, A/O x 3, intact judgement and insight  Skin: Warm extremities      Lab Data Review:  No results found for: CHOLSTRLTOT, LDL, HDL, TRIGLYCERIDE    Lab Results   Component Value Date/Time    SODIUM 134 (L) 05/08/2021 09:32 PM    POTASSIUM 4.0 05/08/2021 09:32 PM    CHLORIDE 102 05/08/2021 09:32 PM    CO2 19 (L) 05/08/2021 09:32 PM    GLUCOSE 226 (H) 05/08/2021 09:32 PM    BUN 10 05/08/2021 09:32 PM    CREATININE 0.44 (L) 05/08/2021 09:32 PM     Lab Results   Component Value Date/Time    ALKPHOSPHAT 65 05/08/2021 09:32 PM    ASTSGOT 17 05/08/2021 09:32 PM    ALTSGPT 22 05/08/2021 09:32 PM    TBILIRUBIN 0.4 05/08/2021 09:32 PM      Lab Results   Component Value Date/Time    WBC 10.9 (H) 05/08/2021 09:32 PM     No results found for: HBA1C      Cardiac Imaging and Procedures Review:    EKG dated 10/5/2021: My personal interpretation is sinus rhythm    No prior echocardiogram    Assessment & Plan     1. Palpitations  EKG    Cardiac Event Monitor   2. Dyspnea on exertion  EC-ECHOCARDIOGRAM COMPLETE W/O CONT    NM-CARDIAC STRESS TEST   3. Dyslipidemia  Lipid Profile   4. Preop cardiovascular exam  EC-ECHOCARDIOGRAM COMPLETE W/O CONT    NM-CARDIAC STRESS TEST         Shared Medical Decision Making:    Dyspnea on exertion  Pre-op for ventral hernia repair  Patient reports that she has been working on losing weight (she was told to lose 100 pounds by the surgeon) prior to undergoing ventral hernia repair.  -Obtain nuclear stress test to assess for any ischemia  -Obtain echocardiogram to assess LVEF any structural abnormalities     Palpitations  -Obtain event monitor to assess for any arrhythmias   -Obtain echocardiogram as above    Dyslipidemia  Family history of early CAD  Patient previously on atorvastatin, but taken off due  to normal labs according to the patient.  -Obtain lipids prior to next visit    All of the patient's excellent questions were answered to the best of my knowledge and to her satisfaction.  It was a pleasure seeing Ms. Jordyn Rick in my clinic today. Return in about 10 weeks (around 12/14/2021). Patient is aware to call the cardiology clinic with any questions or concerns.      Yang Varela MD  [facility], [address]  [address] [address]  Phone: [phone]  Fax: [phone]

## 2021-10-06 LAB — EKG IMPRESSION: NORMAL

## 2021-10-06 PROCEDURE — 93000 ELECTROCARDIOGRAM COMPLETE: CPT | Performed by: STUDENT IN AN ORGANIZED HEALTH CARE EDUCATION/TRAINING PROGRAM

## 2021-12-07 ENCOUNTER — HOSPITAL ENCOUNTER (OUTPATIENT)
Dept: CARDIOLOGY | Facility: MEDICAL CENTER | Age: 56
End: 2021-12-07
Attending: STUDENT IN AN ORGANIZED HEALTH CARE EDUCATION/TRAINING PROGRAM
Payer: MEDICAID

## 2021-12-07 ENCOUNTER — APPOINTMENT (OUTPATIENT)
Dept: RADIOLOGY | Facility: MEDICAL CENTER | Age: 56
End: 2021-12-07
Attending: STUDENT IN AN ORGANIZED HEALTH CARE EDUCATION/TRAINING PROGRAM
Payer: MEDICAID

## 2021-12-21 ASSESSMENT — ENCOUNTER SYMPTOMS
VOMITING: 0
WEAKNESS: 0
NIGHT SWEATS: 0
ORTHOPNEA: 0
SYNCOPE: 0
NEAR-SYNCOPE: 0
ABDOMINAL PAIN: 0
DYSPNEA ON EXERTION: 1
PND: 0
FOCAL WEAKNESS: 0
FEVER: 0
PALPITATIONS: 0
DIARRHEA: 0
NAUSEA: 0
DIZZINESS: 0
COUGH: 0
IRREGULAR HEARTBEAT: 0
WHEEZING: 0
SHORTNESS OF BREATH: 0

## 2021-12-22 ENCOUNTER — TELEPHONE (OUTPATIENT)
Dept: CARDIOLOGY | Facility: MEDICAL CENTER | Age: 56
End: 2021-12-22

## 2021-12-22 ENCOUNTER — APPOINTMENT (OUTPATIENT)
Dept: CARDIOLOGY | Facility: MEDICAL CENTER | Age: 56
End: 2021-12-22
Payer: MEDICAID

## 2021-12-22 NOTE — PROGRESS NOTES
Cardiology Follow-up Consultation Note    Date of note:    ***  Primary Care Provider: Swapna Ardon M.D.    Patient Name: Jordyn Rick     YOB: 1965  MRN:              [id number]    Chief Complaint: Palpitations and dyspnea on exertion    History of Present Illness: Ms. Jordyn Rick is a 56 y.o. female whose current medical problems include diabetes, dyslipidemia, and family history of early CAD who is here for cardiac consultation for palpitations, dyspnea on exertion, and preop evaluation.    The patient was last in my clinic on 10/5/2021.  Due to dyspnea on exertion and palpitations, the patient was ordered an echocardiogram, nuclear stress test, an event monitor, which are all pending at this time.    ***the patient was last seen by her primary care physician in April 2021.  She is currently undergoing evaluation by general surgery for ventral hernia repair, pending weight loss. She was referred to cardiology due to complaints of palpitations and dyspnea on exertion.    The patient reports that she has shortness of breath with walking.  She reports associated palpitations.  She denies any chest pain.  No orthopnea, PND, or leg swelling.  She has been working on weight loss with diet in order to undergo ventral hernia repair (she was told to lose 100 pounds).  She denies any syncope or presyncopal episodes.    Cardiovascular Risk Factors:  1. Smoking status: Never smoker  2. Type II Diabetes Mellitus: On medication   3. Hypertension: None  4. Dyslipidemia: On statin  5. Family history of early Coronary Artery Disease in a first degree relative (Male less than 55 years of age; Female less than 65 years of age): Father with MI in 56  6.  Obesity and/or Metabolic Syndrome: BMI 58.76  7. Sedentary lifestyle: Not active but not sedentary    Review of Systems   Constitutional: Negative for fever, malaise/fatigue and night sweats.   Cardiovascular: Positive for dyspnea on exertion. Negative for chest  pain, irregular heartbeat, leg swelling, near-syncope, orthopnea, palpitations, paroxysmal nocturnal dyspnea and syncope.   Respiratory: Negative for cough, shortness of breath and wheezing.    Gastrointestinal: Negative for abdominal pain, diarrhea, nausea and vomiting.   Neurological: Negative for dizziness, focal weakness and weakness.       All other systems reviewed and are negative.       Current Outpatient Medications   Medication Sig Dispense Refill   • Lancets (ONETOUCH DELICA PLUS [id number]) Misc      • ONETOUCH ULTRA strip      • clotrimazole (LOTRIMIN) 1 % Cream      • acyclovir (ZOVIRAX) 800 MG Tab Take 800 mg by mouth 2 times a day.     • metformin (GLUCOPHAGE) 1000 MG tablet Take 1,000 mg by mouth 2 times a day, with meals.     • glipiZIDE SR (GLUCOTROL) 5 MG TABLET SR 24 HR Take 5 mg by mouth every day.     • Garlic 2000 MG Cap Take 1 Cap by mouth 2 Times a Day.     • vitamin A 2400 mcg (8000 units) capsule Take 8,000 Units by mouth every day.     • OIL OF OREGANO PO Take 1 Cap by mouth every day.       No current facility-administered medications for this visit.         Allergies   Allergen Reactions   • Latex Rash     Rash         Physical Exam:  Ambulatory Vitals  There were no vitals taken for this visit.   Oxygen Therapy:     BP Readings from Last 4 Encounters:   10/05/21 142/78   05/09/21 112/67   07/16/20 136/79       Weight/BMI: There is no height or weight on file to calculate BMI.  Wt Readings from Last 4 Encounters:   10/05/21 (!) 141 kg (311 lb)   05/08/21 (!) 143 kg (315 lb 4.1 oz)   07/16/20 (!) 140 kg (308 lb 3.3 oz)       General: Well appearing, obese, and in no apparent distress  Eyes: nl conjunctiva, no icteric sclera  ENT: wearing a mask, normal external appearance of ears  Neck: no visible JVP,  no carotid bruits  Lungs: normal respiratory effort, CTAB  Heart: RRR, no murmurs, no rubs or gallops,  no edema bilateral lower extremities. No LV/RV heave on cardiac palpatation. +  bilateral radial pulses.  + bilateral dp pulses.   Abdomen: soft, non tender, non distended, no masses, normal bowel sounds.  No HSM.  Extremities/MSK: no clubbing, no cyanosis  Neurological: No focal sensory deficits  Psychiatric: Appropriate affect, A/O x 3, intact judgement and insight  Skin: Warm extremities      Lab Data Review:  No results found for: CHOLSTRLTOT, LDL, HDL, TRIGLYCERIDE    Lab Results   Component Value Date/Time    SODIUM 134 (L) 05/08/2021 09:32 PM    POTASSIUM 4.0 05/08/2021 09:32 PM    CHLORIDE 102 05/08/2021 09:32 PM    CO2 19 (L) 05/08/2021 09:32 PM    GLUCOSE 226 (H) 05/08/2021 09:32 PM    BUN 10 05/08/2021 09:32 PM    CREATININE 0.44 (L) 05/08/2021 09:32 PM     Lab Results   Component Value Date/Time    ALKPHOSPHAT 65 05/08/2021 09:32 PM    ASTSGOT 17 05/08/2021 09:32 PM    ALTSGPT 22 05/08/2021 09:32 PM    TBILIRUBIN 0.4 05/08/2021 09:32 PM      Lab Results   Component Value Date/Time    WBC 10.9 (H) 05/08/2021 09:32 PM     No results found for: HBA1C      Cardiac Imaging and Procedures Review:    EKG dated 10/5/2021: My personal interpretation is sinus rhythm    No prior echocardiogram    Assessment & Plan     No diagnosis found.      Shared Medical Decision Making:    Dyspnea on exertion  Pre-op for ventral hernia repair  Patient reports that she has been working on losing weight (she was told to lose 100 pounds by the surgeon) prior to undergoing ventral hernia repair.  -Obtain nuclear stress test to assess for any ischemia  -Obtain echocardiogram to assess LVEF any structural abnormalities     Palpitations  -Obtain event monitor to assess for any arrhythmias   -Obtain echocardiogram as above    Dyslipidemia  Family history of early CAD  Patient previously on atorvastatin, but taken off due to normal labs according to the patient.  -Obtain lipids prior to next visit    All of the patient's excellent questions were answered to the best of my knowledge and to her satisfaction.  It was  a pleasure seeing Ms. Jordyn Rick in my clinic today. No follow-ups on file. Patient is aware to call the cardiology clinic with any questions or concerns.      Yang Varela MD  [facility], [facility]  [address] [address]  Phone: [phone]  Fax: [phone]

## 2021-12-22 NOTE — TELEPHONE ENCOUNTER
HK    Pt called today, had to cancel her Heart Monitor.  She will need an updated referral to schedule after Isidro 3 2022.    Jordyn - [phone]    Thank you,   Geraldine AMBROSE

## 2021-12-23 NOTE — TELEPHONE ENCOUNTER
Attempted to reach patient. Left detailed message for patient regarding concerns. Advised patient to return call with any additional questions.

## 2022-09-05 ENCOUNTER — APPOINTMENT (OUTPATIENT)
Dept: RADIOLOGY | Facility: MEDICAL CENTER | Age: 57
DRG: 330 | End: 2022-09-05
Attending: EMERGENCY MEDICINE
Payer: MEDICAID

## 2022-09-05 ENCOUNTER — HOSPITAL ENCOUNTER (INPATIENT)
Facility: MEDICAL CENTER | Age: 57
LOS: 7 days | DRG: 330 | End: 2022-09-12
Attending: EMERGENCY MEDICINE | Admitting: INTERNAL MEDICINE
Payer: MEDICAID

## 2022-09-05 ENCOUNTER — APPOINTMENT (OUTPATIENT)
Dept: RADIOLOGY | Facility: MEDICAL CENTER | Age: 57
DRG: 330 | End: 2022-09-05
Attending: INTERNAL MEDICINE
Payer: MEDICAID

## 2022-09-05 DIAGNOSIS — Z90.49 S/P SMALL BOWEL RESECTION: ICD-10-CM

## 2022-09-05 DIAGNOSIS — R10.84 GENERALIZED ABDOMINAL PAIN: ICD-10-CM

## 2022-09-05 DIAGNOSIS — K56.609 SMALL BOWEL OBSTRUCTION (HCC): ICD-10-CM

## 2022-09-05 DIAGNOSIS — K56.690 OTHER PARTIAL INTESTINAL OBSTRUCTION (HCC): ICD-10-CM

## 2022-09-05 DIAGNOSIS — E87.6 HYPOKALEMIA: ICD-10-CM

## 2022-09-05 DIAGNOSIS — E66.01 MORBID OBESITY WITH BMI OF 50.0-59.9, ADULT (HCC): ICD-10-CM

## 2022-09-05 PROBLEM — K43.9 VENTRAL HERNIA: Status: ACTIVE | Noted: 2022-09-05

## 2022-09-05 PROBLEM — E11.9 DM (DIABETES MELLITUS) (HCC): Status: ACTIVE | Noted: 2022-09-05

## 2022-09-05 PROBLEM — E87.1 HYPONATREMIA: Status: ACTIVE | Noted: 2022-09-05

## 2022-09-05 PROBLEM — I10 PRIMARY HYPERTENSION: Status: ACTIVE | Noted: 2022-09-05

## 2022-09-05 PROBLEM — R65.10 SIRS (SYSTEMIC INFLAMMATORY RESPONSE SYNDROME) (HCC): Status: ACTIVE | Noted: 2022-09-05

## 2022-09-05 LAB
ALBUMIN SERPL BCP-MCNC: 4.2 G/DL (ref 3.2–4.9)
ALBUMIN/GLOB SERPL: 1.1 G/DL
ALP SERPL-CCNC: 70 U/L (ref 30–99)
ALT SERPL-CCNC: 33 U/L (ref 2–50)
ANION GAP SERPL CALC-SCNC: 18 MMOL/L (ref 7–16)
APPEARANCE UR: ABNORMAL
AST SERPL-CCNC: 30 U/L (ref 12–45)
BACTERIA #/AREA URNS HPF: ABNORMAL /HPF
BASOPHILS # BLD AUTO: 0.2 % (ref 0–1.8)
BASOPHILS # BLD: 0.02 K/UL (ref 0–0.12)
BILIRUB SERPL-MCNC: 0.7 MG/DL (ref 0.1–1.5)
BILIRUB UR QL STRIP.AUTO: ABNORMAL
BUN SERPL-MCNC: 12 MG/DL (ref 8–22)
CALCIUM SERPL-MCNC: 9.5 MG/DL (ref 8.4–10.2)
CHLORIDE SERPL-SCNC: 95 MMOL/L (ref 96–112)
CO2 SERPL-SCNC: 21 MMOL/L (ref 20–33)
COLOR UR: YELLOW
CREAT SERPL-MCNC: 0.52 MG/DL (ref 0.5–1.4)
EKG IMPRESSION: NORMAL
EOSINOPHIL # BLD AUTO: 0.01 K/UL (ref 0–0.51)
EOSINOPHIL NFR BLD: 0.1 % (ref 0–6.9)
EPI CELLS #/AREA URNS HPF: ABNORMAL /HPF
ERYTHROCYTE [DISTWIDTH] IN BLOOD BY AUTOMATED COUNT: 50.3 FL (ref 35.9–50)
GFR SERPLBLD CREATININE-BSD FMLA CKD-EPI: 109 ML/MIN/1.73 M 2
GLOBULIN SER CALC-MCNC: 3.9 G/DL (ref 1.9–3.5)
GLUCOSE BLD STRIP.AUTO-MCNC: 172 MG/DL (ref 65–99)
GLUCOSE SERPL-MCNC: 229 MG/DL (ref 65–99)
GLUCOSE UR STRIP.AUTO-MCNC: NEGATIVE MG/DL
HCT VFR BLD AUTO: 47.6 % (ref 37–47)
HGB BLD-MCNC: 15.5 G/DL (ref 12–16)
IMM GRANULOCYTES # BLD AUTO: 0.07 K/UL (ref 0–0.11)
IMM GRANULOCYTES NFR BLD AUTO: 0.5 % (ref 0–0.9)
KETONES UR STRIP.AUTO-MCNC: >=80 MG/DL
LEUKOCYTE ESTERASE UR QL STRIP.AUTO: NEGATIVE
LIPASE SERPL-CCNC: 11 U/L (ref 7–58)
LYMPHOCYTES # BLD AUTO: 1.26 K/UL (ref 1–4.8)
LYMPHOCYTES NFR BLD: 9.7 % (ref 22–41)
MAGNESIUM SERPL-MCNC: 1.8 MG/DL (ref 1.5–2.5)
MCH RBC QN AUTO: 25.5 PG (ref 27–33)
MCHC RBC AUTO-ENTMCNC: 32.6 G/DL (ref 33.6–35)
MCV RBC AUTO: 78.2 FL (ref 81.4–97.8)
MICRO URNS: ABNORMAL
MONOCYTES # BLD AUTO: 0.78 K/UL (ref 0–0.85)
MONOCYTES NFR BLD AUTO: 6 % (ref 0–13.4)
MUCOUS THREADS #/AREA URNS HPF: ABNORMAL /HPF
NEUTROPHILS # BLD AUTO: 10.87 K/UL (ref 2–7.15)
NEUTROPHILS NFR BLD: 83.5 % (ref 44–72)
NITRITE UR QL STRIP.AUTO: NEGATIVE
NRBC # BLD AUTO: 0 K/UL
NRBC BLD-RTO: 0 /100 WBC
PH UR STRIP.AUTO: 6 [PH] (ref 5–8)
PLATELET # BLD AUTO: 308 K/UL (ref 164–446)
PMV BLD AUTO: 9.7 FL (ref 9–12.9)
POTASSIUM SERPL-SCNC: 3.3 MMOL/L (ref 3.6–5.5)
PROT SERPL-MCNC: 8.1 G/DL (ref 6–8.2)
PROT UR QL STRIP: 30 MG/DL
RBC # BLD AUTO: 6.09 M/UL (ref 4.2–5.4)
RBC # URNS HPF: ABNORMAL /HPF
RBC UR QL AUTO: NEGATIVE
SODIUM SERPL-SCNC: 134 MMOL/L (ref 135–145)
SP GR UR STRIP.AUTO: 1.02
TROPONIN T SERPL-MCNC: <6 NG/L (ref 6–19)
WBC # BLD AUTO: 13 K/UL (ref 4.8–10.8)
WBC #/AREA URNS HPF: ABNORMAL /HPF

## 2022-09-05 PROCEDURE — 83735 ASSAY OF MAGNESIUM: CPT

## 2022-09-05 PROCEDURE — 96376 TX/PRO/DX INJ SAME DRUG ADON: CPT

## 2022-09-05 PROCEDURE — 99223 1ST HOSP IP/OBS HIGH 75: CPT | Performed by: INTERNAL MEDICINE

## 2022-09-05 PROCEDURE — 84484 ASSAY OF TROPONIN QUANT: CPT

## 2022-09-05 PROCEDURE — 99285 EMERGENCY DEPT VISIT HI MDM: CPT

## 2022-09-05 PROCEDURE — 94760 N-INVAS EAR/PLS OXIMETRY 1: CPT

## 2022-09-05 PROCEDURE — 81001 URINALYSIS AUTO W/SCOPE: CPT

## 2022-09-05 PROCEDURE — 93005 ELECTROCARDIOGRAM TRACING: CPT | Performed by: EMERGENCY MEDICINE

## 2022-09-05 PROCEDURE — 96375 TX/PRO/DX INJ NEW DRUG ADDON: CPT

## 2022-09-05 PROCEDURE — 700105 HCHG RX REV CODE 258: Performed by: INTERNAL MEDICINE

## 2022-09-05 PROCEDURE — 83690 ASSAY OF LIPASE: CPT

## 2022-09-05 PROCEDURE — 36415 COLL VENOUS BLD VENIPUNCTURE: CPT

## 2022-09-05 PROCEDURE — 96374 THER/PROPH/DIAG INJ IV PUSH: CPT

## 2022-09-05 PROCEDURE — 700111 HCHG RX REV CODE 636 W/ 250 OVERRIDE (IP): Performed by: INTERNAL MEDICINE

## 2022-09-05 PROCEDURE — 700117 HCHG RX CONTRAST REV CODE 255: Performed by: EMERGENCY MEDICINE

## 2022-09-05 PROCEDURE — 82962 GLUCOSE BLOOD TEST: CPT

## 2022-09-05 PROCEDURE — 770006 HCHG ROOM/CARE - MED/SURG/GYN SEMI*

## 2022-09-05 PROCEDURE — 74177 CT ABD & PELVIS W/CONTRAST: CPT

## 2022-09-05 PROCEDURE — 700111 HCHG RX REV CODE 636 W/ 250 OVERRIDE (IP): Performed by: EMERGENCY MEDICINE

## 2022-09-05 PROCEDURE — 700105 HCHG RX REV CODE 258: Performed by: EMERGENCY MEDICINE

## 2022-09-05 PROCEDURE — 80053 COMPREHEN METABOLIC PANEL: CPT

## 2022-09-05 PROCEDURE — 85025 COMPLETE CBC W/AUTO DIFF WBC: CPT

## 2022-09-05 RX ORDER — HYDROMORPHONE HYDROCHLORIDE 1 MG/ML
1 INJECTION, SOLUTION INTRAMUSCULAR; INTRAVENOUS; SUBCUTANEOUS ONCE
Status: COMPLETED | OUTPATIENT
Start: 2022-09-05 | End: 2022-09-05

## 2022-09-05 RX ORDER — TRAZODONE HYDROCHLORIDE 50 MG/1
50 TABLET ORAL NIGHTLY PRN
COMMUNITY
End: 2022-10-13

## 2022-09-05 RX ORDER — OXYCODONE HYDROCHLORIDE 5 MG/1
5 TABLET ORAL
Status: DISCONTINUED | OUTPATIENT
Start: 2022-09-05 | End: 2022-09-06

## 2022-09-05 RX ORDER — PROMETHAZINE HYDROCHLORIDE 25 MG/1
12.5-25 SUPPOSITORY RECTAL EVERY 4 HOURS PRN
Status: DISCONTINUED | OUTPATIENT
Start: 2022-09-05 | End: 2022-09-12 | Stop reason: HOSPADM

## 2022-09-05 RX ORDER — ACETAMINOPHEN 325 MG/1
650 TABLET ORAL EVERY 6 HOURS PRN
Status: DISCONTINUED | OUTPATIENT
Start: 2022-09-05 | End: 2022-09-12 | Stop reason: HOSPADM

## 2022-09-05 RX ORDER — PROMETHAZINE HYDROCHLORIDE 25 MG/1
12.5-25 TABLET ORAL EVERY 4 HOURS PRN
Status: DISCONTINUED | OUTPATIENT
Start: 2022-09-05 | End: 2022-09-12 | Stop reason: HOSPADM

## 2022-09-05 RX ORDER — ONDANSETRON 4 MG/1
4 TABLET, ORALLY DISINTEGRATING ORAL EVERY 4 HOURS PRN
Status: DISCONTINUED | OUTPATIENT
Start: 2022-09-05 | End: 2022-09-12 | Stop reason: HOSPADM

## 2022-09-05 RX ORDER — PROCHLORPERAZINE EDISYLATE 5 MG/ML
5-10 INJECTION INTRAMUSCULAR; INTRAVENOUS EVERY 4 HOURS PRN
Status: DISCONTINUED | OUTPATIENT
Start: 2022-09-05 | End: 2022-09-12 | Stop reason: HOSPADM

## 2022-09-05 RX ORDER — HYDROMORPHONE HYDROCHLORIDE 1 MG/ML
.5-1 INJECTION, SOLUTION INTRAMUSCULAR; INTRAVENOUS; SUBCUTANEOUS
Status: DISCONTINUED | OUTPATIENT
Start: 2022-09-05 | End: 2022-09-06

## 2022-09-05 RX ORDER — HEPARIN SODIUM 5000 [USP'U]/ML
5000 INJECTION, SOLUTION INTRAVENOUS; SUBCUTANEOUS EVERY 8 HOURS
Status: DISCONTINUED | OUTPATIENT
Start: 2022-09-06 | End: 2022-09-12 | Stop reason: HOSPADM

## 2022-09-05 RX ORDER — ONDANSETRON 2 MG/ML
4 INJECTION INTRAMUSCULAR; INTRAVENOUS ONCE
Status: COMPLETED | OUTPATIENT
Start: 2022-09-05 | End: 2022-09-05

## 2022-09-05 RX ORDER — IBUPROFEN 200 MG
600 TABLET ORAL EVERY 6 HOURS PRN
Status: ON HOLD | COMMUNITY
End: 2022-09-12

## 2022-09-05 RX ORDER — OXYCODONE HYDROCHLORIDE 5 MG/1
2.5 TABLET ORAL
Status: DISCONTINUED | OUTPATIENT
Start: 2022-09-05 | End: 2022-09-06

## 2022-09-05 RX ORDER — OXYCODONE HYDROCHLORIDE 5 MG/1
2.5 TABLET ORAL
Status: DISCONTINUED | OUTPATIENT
Start: 2022-09-05 | End: 2022-09-05

## 2022-09-05 RX ORDER — SODIUM CHLORIDE, SODIUM LACTATE, POTASSIUM CHLORIDE, CALCIUM CHLORIDE 600; 310; 30; 20 MG/100ML; MG/100ML; MG/100ML; MG/100ML
1000 INJECTION, SOLUTION INTRAVENOUS ONCE
Status: COMPLETED | OUTPATIENT
Start: 2022-09-05 | End: 2022-09-05

## 2022-09-05 RX ORDER — SODIUM CHLORIDE 9 MG/ML
INJECTION, SOLUTION INTRAVENOUS CONTINUOUS
Status: ACTIVE | OUTPATIENT
Start: 2022-09-05 | End: 2022-09-07

## 2022-09-05 RX ORDER — LABETALOL HYDROCHLORIDE 5 MG/ML
10 INJECTION, SOLUTION INTRAVENOUS EVERY 4 HOURS PRN
Status: DISCONTINUED | OUTPATIENT
Start: 2022-09-05 | End: 2022-09-12 | Stop reason: HOSPADM

## 2022-09-05 RX ORDER — HYDROMORPHONE HYDROCHLORIDE 1 MG/ML
0.25 INJECTION, SOLUTION INTRAMUSCULAR; INTRAVENOUS; SUBCUTANEOUS
Status: DISCONTINUED | OUTPATIENT
Start: 2022-09-05 | End: 2022-09-05

## 2022-09-05 RX ORDER — OXYCODONE HYDROCHLORIDE 5 MG/1
5 TABLET ORAL
Status: DISCONTINUED | OUTPATIENT
Start: 2022-09-05 | End: 2022-09-05

## 2022-09-05 RX ORDER — ONDANSETRON 2 MG/ML
4 INJECTION INTRAMUSCULAR; INTRAVENOUS EVERY 4 HOURS PRN
Status: DISCONTINUED | OUTPATIENT
Start: 2022-09-05 | End: 2022-09-12 | Stop reason: HOSPADM

## 2022-09-05 RX ADMIN — ONDANSETRON 4 MG: 2 INJECTION INTRAMUSCULAR; INTRAVENOUS at 09:28

## 2022-09-05 RX ADMIN — SODIUM CHLORIDE: 9 INJECTION, SOLUTION INTRAVENOUS at 17:24

## 2022-09-05 RX ADMIN — HYDROMORPHONE HYDROCHLORIDE 0.25 MG: 1 INJECTION, SOLUTION INTRAMUSCULAR; INTRAVENOUS; SUBCUTANEOUS at 20:32

## 2022-09-05 RX ADMIN — SODIUM CHLORIDE, POTASSIUM CHLORIDE, SODIUM LACTATE AND CALCIUM CHLORIDE 1000 ML: 600; 310; 30; 20 INJECTION, SOLUTION INTRAVENOUS at 09:28

## 2022-09-05 RX ADMIN — HYDROMORPHONE HYDROCHLORIDE 0.25 MG: 1 INJECTION, SOLUTION INTRAMUSCULAR; INTRAVENOUS; SUBCUTANEOUS at 18:44

## 2022-09-05 RX ADMIN — HYDROMORPHONE HYDROCHLORIDE 1 MG: 1 INJECTION, SOLUTION INTRAMUSCULAR; INTRAVENOUS; SUBCUTANEOUS at 09:28

## 2022-09-05 RX ADMIN — HYDROMORPHONE HYDROCHLORIDE 1 MG: 1 INJECTION, SOLUTION INTRAMUSCULAR; INTRAVENOUS; SUBCUTANEOUS at 11:59

## 2022-09-05 RX ADMIN — IOHEXOL 100 ML: 350 INJECTION, SOLUTION INTRAVENOUS at 11:59

## 2022-09-05 ASSESSMENT — COGNITIVE AND FUNCTIONAL STATUS - GENERAL
MOBILITY SCORE: 24
DAILY ACTIVITIY SCORE: 24
SUGGESTED CMS G CODE MODIFIER DAILY ACTIVITY: CH
SUGGESTED CMS G CODE MODIFIER MOBILITY: CH

## 2022-09-05 ASSESSMENT — LIFESTYLE VARIABLES
TOTAL SCORE: 0
ALCOHOL_USE: YES
ON A TYPICAL DAY WHEN YOU DRINK ALCOHOL HOW MANY DRINKS DO YOU HAVE: 1
HAVE PEOPLE ANNOYED YOU BY CRITICIZING YOUR DRINKING: NO
EVER HAD A DRINK FIRST THING IN THE MORNING TO STEADY YOUR NERVES TO GET RID OF A HANGOVER: NO
HAVE YOU EVER FELT YOU SHOULD CUT DOWN ON YOUR DRINKING: NO
TOTAL SCORE: 0
EVER FELT BAD OR GUILTY ABOUT YOUR DRINKING: NO
TOTAL SCORE: 0
HOW MANY TIMES IN THE PAST YEAR HAVE YOU HAD 5 OR MORE DRINKS IN A DAY: 0
CONSUMPTION TOTAL: NEGATIVE
AVERAGE NUMBER OF DAYS PER WEEK YOU HAVE A DRINK CONTAINING ALCOHOL: 1

## 2022-09-05 ASSESSMENT — ENCOUNTER SYMPTOMS
HEADACHES: 0
DEPRESSION: 0
CONSTIPATION: 1
SHORTNESS OF BREATH: 0
VOMITING: 1
ABDOMINAL PAIN: 1
NAUSEA: 1
MYALGIAS: 0
COUGH: 0
DIZZINESS: 0
SPUTUM PRODUCTION: 0
BACK PAIN: 1
CHILLS: 0
BLURRED VISION: 0
DIARRHEA: 0
FEVER: 0

## 2022-09-05 ASSESSMENT — PAIN DESCRIPTION - PAIN TYPE: TYPE: ACUTE PAIN

## 2022-09-05 ASSESSMENT — PAIN DESCRIPTION - DESCRIPTORS: DESCRIPTORS: ACHING

## 2022-09-05 ASSESSMENT — PATIENT HEALTH QUESTIONNAIRE - PHQ9
2. FEELING DOWN, DEPRESSED, IRRITABLE, OR HOPELESS: NOT AT ALL
1. LITTLE INTEREST OR PLEASURE IN DOING THINGS: NOT AT ALL
SUM OF ALL RESPONSES TO PHQ9 QUESTIONS 1 AND 2: 0

## 2022-09-05 ASSESSMENT — FIBROSIS 4 INDEX: FIB4 SCORE: 0.58

## 2022-09-05 NOTE — ED NOTES
Patient had low o2 saturation low but returned to normal once she repositioned. Patient reports back pain increasing, nausea well controlled.

## 2022-09-05 NOTE — ASSESSMENT & PLAN NOTE
Body mass index is 54.4 kg/m².  Patient will need outpatient weight loss management program as well as lifestyle modification program.

## 2022-09-05 NOTE — H&P
Hospital Medicine History & Physical Note    Date of Service  9/5/2022    Primary Care Physician  Swapna Ardon M.D.    Consultants  general surgery    Specialist Names: Dr. Win    Code Status  Full Code    Chief Complaint  Chief Complaint   Patient presents with    Abdominal Pain    Constipation    Back Pain       History of Presenting Illness  Jordyn Rick is a 57 y.o. female who presented 9/5/2022 with abdominal and back pain.  Patient reports on Friday she went camping and had a sudden onset severe stabbing abdominal pain with nausea and vomiting.  She vomited multiple times on Friday, denied any blood.  She was able to lay in an anti gravity chair but decided to go back home on Saturday.  She continues to have nausea and vomiting for the last few days.  Today she started having some sudden back pain in addition to her abdominal pain and presented to the ED.  She denies any fevers, chills, chest pain or shortness of breath.  Patient has a large incarcerated umbilical hernia for which she saw surgeon, Dr. Win, a little over a year ago who told her she needed to lose 100 pounds prior to surgery.  She reports she completely changed her diet to mostly plant-based and is now pescatarian, she has also been increasing her exercise.  She reports she has lost 35 pounds thus far.  She also reports about a year ago she had a very shallow vein over the hernia that started bleeding, this was treated with pressure and she keeps a bandage on it throughout the day, denies any recurrence.  While patient was down in CT she reports she had to lift her arms up overhead to get onto the table.  She reports significant relief in her pain and feels like now her bowels are gurgling since.  Patient denies any current nausea.  Her last bowel movement was 4 days ago.    In the ER vitals significant for tachypnea, hypertension.  Labs significant for WBC 13, sodium 134, potassium 3.3, glucose 229, Negative troponin.  CT abdomen pelvis  showed a large midline lower abdominal ventral hernia containing colonic and small bowel loops likely causing distal small bowel obstruction, no evidence of bowel perforation.  General surgery consulted by the ER.    I discussed the plan of care with patient.    Review of Systems  Review of Systems   Constitutional:  Positive for malaise/fatigue. Negative for chills and fever.   HENT:  Negative for hearing loss.    Eyes:  Negative for blurred vision.   Respiratory:  Negative for cough, sputum production and shortness of breath.    Cardiovascular:  Negative for chest pain and leg swelling.   Gastrointestinal:  Positive for abdominal pain, constipation, nausea and vomiting. Negative for diarrhea.   Genitourinary:  Negative for dysuria.   Musculoskeletal:  Positive for back pain. Negative for myalgias.   Skin:  Negative for rash.   Neurological:  Negative for dizziness and headaches.   Psychiatric/Behavioral:  Negative for depression.    All other systems reviewed and are negative.    Past Medical History   has a past medical history of Bell's palsy, Diabetes (HCC), Gout, and Shingles of eyelid. Hypertension, hyperlipidemia    Surgical History   Denies past surgical history     Family History  FHx of early CAD   Family history reviewed with patient. There is no family history that is pertinent to the chief complaint.     Social History   reports that she has never smoked. She has never used smokeless tobacco. She reports current alcohol use. She reports that she does not use drugs. Occasional alcohol use     Allergies  Allergies   Allergen Reactions    Latex Rash     Rash         Medications  Prior to Admission Medications   Prescriptions Last Dose Informant Patient Reported? Taking?   Doxylamine Succinate, Sleep, (SLEEP AID PO) 9/3/2022 at PM Patient Yes Yes   Sig: Take 1 Tablet by mouth at bedtime as needed (For sleep). OTC   GARLIC PO 9/5/2022 at 0400 Patient Yes No   Sig: Take 2 Capsules by mouth every day.    acyclovir (ZOVIRAX) 400 MG tablet 9/2/2022 at AM Patient Yes No   Sig: Take 400 mg by mouth 2 times a day.   glipiZIDE SR (GLUCOTROL) 5 MG TABLET SR 24 HR 9/2/2022 at AM Patient Yes No   Sig: Take 5 mg by mouth every day.   ibuprofen (MOTRIN) 200 MG Tab 9/4/2022 at 1200 Patient Yes Yes   Sig: Take 600 mg by mouth every 6 hours as needed for Mild Pain.   metformin (GLUCOPHAGE) 1000 MG tablet 9/2/2022 at AM Patient Yes No   Sig: Take 1,000 mg by mouth 2 times a day, with meals.   traZODone (DESYREL) 50 MG Tab 9/4/2022 at 1200 Patient Yes Yes   Sig: Take 50 mg by mouth at bedtime as needed for Sleep.      Facility-Administered Medications: None       Physical Exam  Pulse:  [76-98] 77  Resp:  [17-24] 17  BP: (139-184)/(87-95) 166/87  SpO2:  [94 %-98 %] 97 %  Blood Pressure: (!) 149/93       Pulse: 80   Respiration: (!) 24   Pulse Oximetry: 97 %       Physical Exam  Vitals and nursing note reviewed.   Constitutional:       General: She is not in acute distress.     Appearance: She is morbidly obese. She is not ill-appearing.   HENT:      Head: Normocephalic and atraumatic.      Right Ear: External ear normal.      Left Ear: External ear normal.      Nose: Nose normal.      Mouth/Throat:      Mouth: Mucous membranes are dry.      Pharynx: Oropharynx is clear.   Eyes:      Extraocular Movements: Extraocular movements intact.      Conjunctiva/sclera: Conjunctivae normal.      Pupils: Pupils are equal, round, and reactive to light.   Cardiovascular:      Rate and Rhythm: Normal rate and regular rhythm.      Pulses: Normal pulses.      Heart sounds: Normal heart sounds.   Pulmonary:      Effort: Pulmonary effort is normal. No respiratory distress.      Breath sounds: Normal breath sounds. No wheezing or rales.   Abdominal:      General: Abdomen is flat. There is distension.      Tenderness: There is abdominal tenderness (mild diffuse after pain meds). There is no guarding.      Hernia: A hernia (large ventral hernia) is  present.   Musculoskeletal:         General: Normal range of motion.      Cervical back: Normal range of motion and neck supple.      Right lower leg: No edema.      Left lower leg: No edema.   Skin:     General: Skin is warm and dry.   Neurological:      General: No focal deficit present.      Mental Status: She is alert and oriented to person, place, and time.      Cranial Nerves: No cranial nerve deficit.      Motor: No weakness.   Psychiatric:         Mood and Affect: Mood normal.         Behavior: Behavior normal.         Thought Content: Thought content normal.       Laboratory:  Recent Labs     09/05/22  0853   WBC 13.0*   RBC 6.09*   HEMOGLOBIN 15.5   HEMATOCRIT 47.6*   MCV 78.2*   MCH 25.5*   MCHC 32.6*   RDW 50.3*   PLATELETCT 308   MPV 9.7     Recent Labs     09/05/22  0853   SODIUM 134*   POTASSIUM 3.3*   CHLORIDE 95*   CO2 21   GLUCOSE 229*   BUN 12   CREATININE 0.52   CALCIUM 9.5     Recent Labs     09/05/22  0853   ALTSGPT 33   ASTSGOT 30   ALKPHOSPHAT 70   TBILIRUBIN 0.7   LIPASE 11   GLUCOSE 229*         No results for input(s): NTPROBNP in the last 72 hours.      Recent Labs     09/05/22  0853   TROPONINT <6       Imaging:  CT-ABDOMEN-PELVIS WITH   Final Result      1.  Large midline lower abdominal ventral hernia containing colonic and small bowel loops, likely causing distal small bowel obstruction.   2.  No evidence of bowel perforation.   3.  Small volume ascites.   4.  Enlarged fatty liver.   5.  Stable nonspecific LEFT adrenal nodule.          EKG:  I have personally reviewed the images and compared with prior images.    Assessment/Plan:  Justification for Admission Status  I anticipate this patient will require at least two midnights for appropriate medical management, necessitating inpatient admission because small bowel obstruction, requiring bowel rest. Surgery to evaluate.       * Small bowel obstruction (HCC)- (present on admission)  Assessment & Plan  With large umbilical hernia    Surgery consulted   NPO, bowel rest    IVF   Patient reports she feels her bowel is moving after CT, will hold off on NG tube   Pain control     Ventral hernia- (present on admission)  Assessment & Plan  Evaluated by Dr. Win outpatient ~1 year ago   Working on losing weight for surgery   Now with obstruction   Surgery, Dr. Win consulted     Hypokalemia- (present on admission)  Assessment & Plan  2/2 gi losses   IV replacement ordered by the ER   Replace as needed   Check Mg level    Hyponatremia- (present on admission)  Assessment & Plan  Mild 2/2 hypovolemia   IVF   Repeat BMP in am    SIRS (systemic inflammatory response syndrome) (HCC)- (present on admission)  Assessment & Plan  SIRS criteria identified on my evaluation include:  Tachypnea, with respirations greater than 20 per minute and Leukocytosis, with WBC greater than 12,000  SIRS is non-infectious, the patient does not have sepsis    No signs of infection, likely reactive 2/2 GI loss and SBO   IVF  Repeat CBC in am    Morbid obesity with BMI of 50.0-59.9, adult ([facility])- (present on admission)  Assessment & Plan  Has already changed her diet (pescatarian) and started exercising  Needs to lose 100 lbs for hernia surgery   -has lost 35 lbs so far     Primary hypertension- (present on admission)  Assessment & Plan  Not on home meds per med rec   Prn labetolol    DM (diabetes mellitus) ([facility])- (present on admission)  Assessment & Plan  Hold home metformin and glipizide   ISS with hypoglycemic protocol        VTE prophylaxis: SCDs/TEDs and heparin ppx

## 2022-09-05 NOTE — ED NOTES
Patient resting in gurney, reports pain tolerable at this time. Pt agreeable to admit. No needs at this time. Call bell in reach.

## 2022-09-05 NOTE — ASSESSMENT & PLAN NOTE
Postoperative day #3  Doing well  Clean incision  Staples in place  Outpatient follow-ups for staple removal

## 2022-09-05 NOTE — ED TRIAGE NOTES
"Chief Complaint   Patient presents with    Abdominal Pain    Constipation    Back Pain      Complains of mid abdominal pain radiates to back. States it feels like \" someone took a hammer to my back\". Pt AOx4. Abdomen distended, \" feels bloated\".   "

## 2022-09-05 NOTE — CONSULTS
CONSULT NOTE  09/05/22  Consulting Physician: Dr. Tadeo    PATIENT ID  Name:             Jordyn Rick     YOB: 1965  Age:                 57 y.o.  female   MRN:               [id number]    CHIEF COMPLAINT:        Bowel obstruction    HISTORY OF PRESENT ILLNESS:  This is a 57-year-old female well-known to myself who presents with a several day history of increasing abdominal pain.  She endorses nausea vomiting and constipation.  She was admitted to the emergency department and work-up demonstrates bowel obstruction with possible origin in the hernia.    REVIEW OF SYSTEMS:   Constitutional: Denies unintended weight change, night sweats, fatigue  Eyes:   Denies eye pain, redness, discharge, vision changes  Ears/Nose/Throat/Mouth: Denies hearing changes, ear pain, nasal congestion, sore throat, dysphagia  Cardiovascular:  Denies Chest pain, shortness of breath, orthopnea, palpitations, claudication  Respiratory:  Denies cough, sputum, wheezing, dyspnea  Gastrointestinal/Hepatic:  Denies dysphagia, melena, jaundice, hematochezia, changing heartburn  Genitourinary:  Denies dysuria, increased frequency, hematuria, urgency  Musculoskeletal/Rheum: Denies changing arthralgias, myalgias, joint swelling, joint stiffness  Skin/Breast: Denies changing skin lesions, pruritis, nipple discharge, hair changes  Neurological: Denies weakness, numbness, paresthesia, syncope, dizziness  Pyschiatric: Denies acute depression, anxiety, insomnia, personality changes, delusions  Endocrine: Denies temperature intolerance, polydipsia, polyuria  Heme/Oncology/Lymph Nodes: Denies easy bruising, bleeding, lymphadenopathy  All other systems were reviewed and are negative                 Past Medical History:   Past Medical History:   Diagnosis Date    Bell's palsy     Diabetes (HCC)     Type II    Gout     Shingles of eyelid        Past Surgical History:  No past surgical history on file.    Current Outpatient  Medications:  No current facility-administered medications on file prior to encounter.     Current Outpatient Medications on File Prior to Encounter   Medication Sig Dispense Refill    ibuprofen (MOTRIN) 200 MG Tab Take 600 mg by mouth every 6 hours as needed for Mild Pain.      Doxylamine Succinate, Sleep, (SLEEP AID PO) Take 1 Tablet by mouth at bedtime as needed (For sleep). OTC      traZODone (DESYREL) 50 MG Tab Take 50 mg by mouth at bedtime as needed for Sleep.      acyclovir (ZOVIRAX) 400 MG tablet Take 400 mg by mouth 2 times a day.      metformin (GLUCOPHAGE) 1000 MG tablet Take 1,000 mg by mouth 2 times a day, with meals.      glipiZIDE SR (GLUCOTROL) 5 MG TABLET SR 24 HR Take 5 mg by mouth every day.      GARLIC PO Take 2 Capsules by mouth every day.         Current Inpatient Medications:   Current Facility-Administered Medications   Medication Last Admin    NS infusion      [START ON 9/6/2022] heparin injection 5,000 Units      acetaminophen (Tylenol) tablet 650 mg      Pharmacy Consult Request ...Pain Management Review 1 Each      oxyCODONE immediate-release (ROXICODONE) tablet 2.5 mg      Or    oxyCODONE immediate-release (ROXICODONE) tablet 5 mg      Or    HYDROmorphone (Dilaudid) injection 0.25 mg      ondansetron (ZOFRAN) syringe/vial injection 4 mg      ondansetron (ZOFRAN ODT) dispertab 4 mg      promethazine (PHENERGAN) tablet 12.5-25 mg      promethazine (PHENERGAN) suppository 12.5-25 mg      prochlorperazine (COMPAZINE) injection 5-10 mg      labetalol (NORMODYNE/TRANDATE) injection 10 mg      doxylamine (UNISOM) tablet 25 mg      insulin regular (HumuLIN R,NovoLIN R) injection      And    dextrose 10 % BOLUS 25 g         Medication Allergy/Sensitivities:  Allergies   Allergen Reactions    Latex Rash     Rash         Family History:  No family history on file.  Denies family history of bleeding disorders or reactions to anesthesia    Social History:  PCP: Swapna Ardon M.D.  Social History      Tobacco Use   Smoking Status Never   Smokeless Tobacco Never     Social History     Substance and Sexual Activity   Alcohol Use Yes    Comment: twice a month     Social History     Substance and Sexual Activity   Drug Use Never       PHYSICAL EXAM:  Weight/BMI: Body mass index is 54.4 kg/m².  Vitals:    09/05/22 1127 09/05/22 1227 09/05/22 1251 09/05/22 1351   BP:  (!) 166/87     Pulse: 80 76 77    Resp:   17    Temp:    36.6 °C (97.8 °F)   TempSrc:    Temporal   SpO2: 97% 98% 97%    Weight:       Height:         Oxygen Therapy:  Pulse Oximetry: 97 %, O2 Delivery Device: None - Room Air    Constitutional: Well developed, Well nourished, No acute distress, Non-toxic appearance.    HENMT: Normocephalic, Atraumatic, Bilateral external ears normal, Oropharynx moist mucous membranes, No oral exudates, Nose normal.  No thyromegaly.   Eyes: PERRLA, EOMI, Conjunctiva normal, No discharge.   Neck: Normal range of motion, No cervical tenderness, Supple, No stridor, no JVD.  Cardiovascular: Normal heart rate, Normal rhythm, No murmurs, No rubs, No gallops.   Extremites with intact distal pulses, no cyanosis, clubbing or edema.   Lungs:  Respiratory effort is normal. Normal breath sounds, breath sounds clear to auscultation bilaterally,  no rales, no rhonchi, no wheezing.   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  Skin: Warm, Dry, No erythema, No rash, no induration or crepitus.        Neurologic: Alert & oriented x 3, Normal motor function, Normal sensory function, No focal deficits noted, cranial nerves II through XII are normal.  Psychiatric: Affect normal, Judgment normal, Mood normal.     LAB DATA REVIEWED:  Recent Results (from the past 24 hour(s))   EKG    Collection Time: 09/05/22  8:51 AM   Result Value Ref Range    Report       [facility] Emergency Dept.    Test Date:  2022  Pt Name:    GIORGIO MILIAN                  Department: [facility]  MRN:        [id number]                      Room:       -ROOM 4  Gender:     Female                       Technician: [id number]  :        1965                   Requested By:ANDREW DOMINGUEZ  Order #:    [id number]                    Reading MD: Andrew DOMINGUEZ MD    Measurements  Intervals                                Axis  Rate:       92                           P:          85  UT:         141                          QRS:        62  QRSD:       89                           T:          27  QT:         351  QTc:        435    Interpretive Statements  Normal sinus rhythm rate of 92 normal UT normal QRS normal axis no ST segment  elevations or depressions no significant change from 10/5/2021  Electronically Signed On 2022 12:44:31 PDT by Andrew DOMINGUEZ MD     CBC WITH DIFFERENTIAL    Collection Time: 22  8:53 AM   Result Value Ref Range    WBC 13.0 (H) 4.8 - 10.8 K/uL    RBC 6.09 (H) 4.20 - 5.40 M/uL    Hemoglobin 15.5 12.0 - 16.0 g/dL    Hematocrit 47.6 (H) 37.0 - 47.0 %    MCV 78.2 (L) 81.4 - 97.8 fL    MCH 25.5 (L) 27.0 - 33.0 pg    MCHC 32.6 (L) 33.6 - 35.0 g/dL    RDW 50.3 (H) 35.9 - 50.0 fL    Platelet Count 308 164 - 446 K/uL    MPV 9.7 9.0 - 12.9 fL    Neutrophils-Polys 83.50 (H) 44.00 - 72.00 %    Lymphocytes 9.70 (L) 22.00 - 41.00 %    Monocytes 6.00 0.00 - 13.40 %    Eosinophils 0.10 0.00 - 6.90 %    Basophils 0.20 0.00 - 1.80 %    Immature Granulocytes 0.50 0.00 - 0.90 %    Nucleated RBC 0.00 /100 WBC    Neutrophils (Absolute) 10.87 (H) 2.00 - 7.15 K/uL    Lymphs (Absolute) 1.26 1.00 - 4.80 K/uL    Monos (Absolute) 0.78 0.00 - 0.85 K/uL    Eos (Absolute) 0.01 0.00 - 0.51 K/uL    Baso (Absolute) 0.02 0.00 - 0.12 K/uL    Immature Granulocytes (abs) 0.07 0.00 - 0.11 K/uL    NRBC (Absolute) 0.00 K/uL   COMP METABOLIC PANEL    Collection  Time: 09/05/22  8:53 AM   Result Value Ref Range    Sodium 134 (L) 135 - 145 mmol/L    Potassium 3.3 (L) 3.6 - 5.5 mmol/L    Chloride 95 (L) 96 - 112 mmol/L    Co2 21 20 - 33 mmol/L    Anion Gap 18.0 (H) 7.0 - 16.0    Glucose 229 (H) 65 - 99 mg/dL    Bun 12 8 - 22 mg/dL    Creatinine 0.52 0.50 - 1.40 mg/dL    Calcium 9.5 8.4 - 10.2 mg/dL    AST(SGOT) 30 12 - 45 U/L    ALT(SGPT) 33 2 - 50 U/L    Alkaline Phosphatase 70 30 - 99 U/L    Total Bilirubin 0.7 0.1 - 1.5 mg/dL    Albumin 4.2 3.2 - 4.9 g/dL    Total Protein 8.1 6.0 - 8.2 g/dL    Globulin 3.9 (H) 1.9 - 3.5 g/dL    A-G Ratio 1.1 g/dL   LIPASE    Collection Time: 09/05/22  8:53 AM   Result Value Ref Range    Lipase 11 7 - 58 U/L   TROPONIN    Collection Time: 09/05/22  8:53 AM   Result Value Ref Range    Troponin T <6 6 - 19 ng/L   ESTIMATED GFR    Collection Time: 09/05/22  8:53 AM   Result Value Ref Range    GFR (CKD-EPI) 109 >60 mL/min/1.73 m 2   Magnesium    Collection Time: 09/05/22  8:53 AM   Result Value Ref Range    Magnesium 1.8 1.5 - 2.5 mg/dL   URINALYSIS (UA)    Collection Time: 09/05/22 10:21 AM    Specimen: Urine   Result Value Ref Range    Color Yellow     Character Cloudy (A)     Specific Gravity 1.020 <1.035    Ph 6.0 5.0 - 8.0    Glucose Negative Negative mg/dL    Ketones >=80 (A) Negative mg/dL    Protein 30 (A) Negative mg/dL    Bilirubin Moderate (A) Negative    Nitrite Negative Negative    Leukocyte Esterase Negative Negative    Occult Blood Negative Negative    Micro Urine Req Microscopic    URINE MICROSCOPIC (W/UA)    Collection Time: 09/05/22 10:21 AM   Result Value Ref Range    WBC 0-2 /hpf    RBC Rare /hpf    Bacteria Many (A) None /hpf    Epithelial Cells Few Few /hpf    Mucous Threads Few /hpf       IMAGING:   Images Independently Reviewed   CT-ABDOMEN-PELVIS WITH   Final Result      1.  Large midline lower abdominal ventral hernia containing colonic and small bowel loops, likely causing distal small bowel obstruction.   2.  No  evidence of bowel perforation.   3.  Small volume ascites.   4.  Enlarged fatty liver.   5.  Stable nonspecific LEFT adrenal nodule.          ASSESSMENT/PLAN     Patient with bowel obstruction possibly within the hernia itself.  This is a wide rim hernia and multiple loops of bowel are there within.  She has a benign abdominal exam and even firm pressure on the hernia does not produce any pain.  Given this plan for conservative management.  Recommend NG tube placement.  Small bowel follow-through in the a.m.    Sterling Win MD  [facility]

## 2022-09-05 NOTE — DISCHARGE PLANNING
ER CM met with pt at bedside. AO x4 Pleasant. Lives in 1 floor apt that is on the second floor. She has supportive children. She is independent in ADLS and ambulation.  Dr Ann PCP and [facility].  NO DME or o2  Care Transition Team Assessment    Information Source  Orientation Level: Oriented X4  Information Given By: Patient  Informant's Name: Jordyn  Who is responsible for making decisions for patient? : Patient         Elopement Risk  Legal Hold: No    Interdisciplinary Discharge Planning  Primary Care Physician: Dr Ardon  Support Systems: Children  Housing / Facility: 2 Story Apartment / Condo  Do You Take your Prescribed Medications Regularly: Yes  Mobility Issues: No  Assistance Needed: No  Durable Medical Equipment: Not Applicable    Discharge Preparedness  What is your plan after discharge?: Uncertain - pending medical team collaboration, Home with help  What are your discharge supports?: Child  Prior Functional Level: Ambulatory, Independent with Activities of Daily Living, Independent with Medication Management    Functional Assesment  Prior Functional Level: Ambulatory, Independent with Activities of Daily Living, Independent with Medication Management    Finances  Prescription Coverage: Yes ([facility])                   Domestic Abuse  Have you ever been the victim of abuse or violence?: No    Psychological Assessment  History of Substance Abuse: None         Anticipated Discharge Information  Discharge Disposition: Discharged to home/self care (01)

## 2022-09-05 NOTE — ASSESSMENT & PLAN NOTE
-accus with sliding scale coverage, resume metformin and glipizide now that she is taking orally  -diabetic diet  -diabetic education  -follow glycohemoglobin levels long term  -continue with oral antihyperglycemics  -monitor for hypoglycemic episodes and adjust control if he should get low

## 2022-09-05 NOTE — ED PROVIDER NOTES
"ED Provider Note    CHIEF COMPLAINT  Chief Complaint   Patient presents with    Abdominal Pain    Constipation    Back Pain       HPI  Jordyn Rick is a 57 y.o. female who presents with a chief complaint of back pain.  Bilateral upper back pain.  Sudden onset.  This morning.  Not worse with movement.  Not better with rubbing.  Nonradiating.  No associated vomiting nausea.    Patient was in usual state of health until Friday.  She was going camping.  He says she went camping she had profuse vomiting multiple episodes and she vomited all day on Friday.  She is able to sleep that night and lay in a gravity chair.  She decided quite camping on Saturday come back and has been home on her easy chair Saturday and Sunday.  Drinking fluids.  Not eating anything.  The back pain just started suddenly.  She states that she is uncertain of the etiology associate with her belly being swollen for like \"it is about the burst\".  She does have a history of incarcerated umbilical hernia that the surgeon stated he will do surgery if she who is a bariatric and she needs to lose weight.  Patient states that she is on a pescatarian diet.        REVIEW OF SYSTEMS  Denies any fever chills no chest pain no short radiation of the pain no tearing of the pain into the chest.    PAST MEDICAL HISTORY  Past Medical History:   Diagnosis Date    Bell's palsy     Diabetes (HCC)     Type II    Gout     Shingles of eyelid        FAMILY HISTORY  No family history on file.    SOCIAL HISTORY  Social History     Socioeconomic History    Marital status: Single   Tobacco Use    Smoking status: Never    Smokeless tobacco: Never   Substance and Sexual Activity    Alcohol use: Yes     Comment: twice a month    Drug use: Never       SURGICAL HISTORY  No past surgical history on file.    CURRENT MEDICATIONS  Home Medications    **Home medications have not yet been reviewed for this encounter**          ALLERGIES  Allergies   Allergen Reactions    Latex Rash     " "Rash         PHYSICAL EXAM  VITAL SIGNS: BP (!) 139/93   Pulse 98   Resp (!) 24   Ht 1.549 m (5' 1\")   Wt (!) 131 kg (287 lb 14.7 oz)   SpO2 98%   BMI 54.40 kg/m²   Constitutional: Well developed, Well nourished, No acute distress, Non-toxic appearance.   HENT: Normocephalic, Atraumatic, Bilateral external ears normal, Oropharynx moist, No oral exudates, Nose normal.   Eyes: PERRLA, EOMI, Conjunctiva normal, No discharge.   Musculoskeletal: Neck has normal range of motion, No tenderness, Supple.   Lymphatic: No cervical lymphadenopathy noted.   Cardiovascular: Normal heart rate, Normal rhythm, No murmurs, No rubs, No gallops.   Thorax & Lungs: Normal breath sounds, No respiratory distress, No wheezing, No chest tenderness.   Abdomen: Stented.  Tender in the right upper quadrant right lower quadrant and diffusely.  Skin: Warm, Dry, No erythema, No rash.   : No CVA tenderness.   Psychiatric: Calm, not anxious  Neurologic: Alert & oriented, moves all extremities equally    RADIOLOGY/PROCEDURES  CT-ABDOMEN-PELVIS WITH   Final Result      1.  Large midline lower abdominal ventral hernia containing colonic and small bowel loops, likely causing distal small bowel obstruction.   2.  No evidence of bowel perforation.   3.  Small volume ascites.   4.  Enlarged fatty liver.   5.  Stable nonspecific LEFT adrenal nodule.        Results for orders placed or performed during the hospital encounter of 09/05/22   CBC WITH DIFFERENTIAL   Result Value Ref Range    WBC 13.0 (H) 4.8 - 10.8 K/uL    RBC 6.09 (H) 4.20 - 5.40 M/uL    Hemoglobin 15.5 12.0 - 16.0 g/dL    Hematocrit 47.6 (H) 37.0 - 47.0 %    MCV 78.2 (L) 81.4 - 97.8 fL    MCH 25.5 (L) 27.0 - 33.0 pg    MCHC 32.6 (L) 33.6 - 35.0 g/dL    RDW 50.3 (H) 35.9 - 50.0 fL    Platelet Count 308 164 - 446 K/uL    MPV 9.7 9.0 - 12.9 fL    Neutrophils-Polys 83.50 (H) 44.00 - 72.00 %    Lymphocytes 9.70 (L) 22.00 - 41.00 %    Monocytes 6.00 0.00 - 13.40 %    Eosinophils 0.10 0.00 - " 6.90 %    Basophils 0.20 0.00 - 1.80 %    Immature Granulocytes 0.50 0.00 - 0.90 %    Nucleated RBC 0.00 /100 WBC    Neutrophils (Absolute) 10.87 (H) 2.00 - 7.15 K/uL    Lymphs (Absolute) 1.26 1.00 - 4.80 K/uL    Monos (Absolute) 0.78 0.00 - 0.85 K/uL    Eos (Absolute) 0.01 0.00 - 0.51 K/uL    Baso (Absolute) 0.02 0.00 - 0.12 K/uL    Immature Granulocytes (abs) 0.07 0.00 - 0.11 K/uL    NRBC (Absolute) 0.00 K/uL   COMP METABOLIC PANEL   Result Value Ref Range    Sodium 134 (L) 135 - 145 mmol/L    Potassium 3.3 (L) 3.6 - 5.5 mmol/L    Chloride 95 (L) 96 - 112 mmol/L    Co2 21 20 - 33 mmol/L    Anion Gap 18.0 (H) 7.0 - 16.0    Glucose 229 (H) 65 - 99 mg/dL    Bun 12 8 - 22 mg/dL    Creatinine 0.52 0.50 - 1.40 mg/dL    Calcium 9.5 8.4 - 10.2 mg/dL    AST(SGOT) 30 12 - 45 U/L    ALT(SGPT) 33 2 - 50 U/L    Alkaline Phosphatase 70 30 - 99 U/L    Total Bilirubin 0.7 0.1 - 1.5 mg/dL    Albumin 4.2 3.2 - 4.9 g/dL    Total Protein 8.1 6.0 - 8.2 g/dL    Globulin 3.9 (H) 1.9 - 3.5 g/dL    A-G Ratio 1.1 g/dL   LIPASE   Result Value Ref Range    Lipase 11 7 - 58 U/L   URINALYSIS (UA)    Specimen: Urine   Result Value Ref Range    Color Yellow     Character Cloudy (A)     Specific Gravity 1.020 <1.035    Ph 6.0 5.0 - 8.0    Glucose Negative Negative mg/dL    Ketones >=80 (A) Negative mg/dL    Protein 30 (A) Negative mg/dL    Bilirubin Moderate (A) Negative    Nitrite Negative Negative    Leukocyte Esterase Negative Negative    Occult Blood Negative Negative    Micro Urine Req Microscopic    TROPONIN   Result Value Ref Range    Troponin T <6 6 - 19 ng/L   ESTIMATED GFR   Result Value Ref Range    GFR (CKD-EPI) 109 >60 mL/min/1.73 m 2   URINE MICROSCOPIC (W/UA)   Result Value Ref Range    WBC 0-2 /hpf    RBC Rare /hpf    Bacteria Many (A) None /hpf    Epithelial Cells Few Few /hpf    Mucous Threads Few /hpf   EKG   Result Value Ref Range    Report       [facility]    Test Date:  2022-09-05  Pt  Name:    GIORGIO MILIAN                  Department: [facility]  MRN:        [id number]                      [facility]  Gender:     Female                       Technician: [id number]  :        1965                   Requested By:ANDREW DOMINGUEZ  Order #:    [id number]                    Reading MD: Andrew DOMINGUEZ MD    Measurements  Intervals                                Axis  Rate:       92                           P:          85  AZ:         141                          QRS:        62  QRSD:       89                           T:          27  QT:         351  QTc:        435    Interpretive Statements  Normal sinus rhythm rate of 92 normal AZ normal QRS normal axis no ST segment  elevations or depressions no significant change from 10/5/2021  Electronically Signed On 2022 12:44:31 PDT by Andrew DOMINGUEZ MD          COURSE & MEDICAL DECISION MAKING  Pertinent Labs & Imaging studies reviewed. (See chart for details)  Is a obese 57-year-old female very pleasant with an unfortunate history of nausea vomiting over the weekend and now with bilateral upper back pain different differential could be ileus bowel obstruction less likely but still possible pancreatitis hepatitis gallbladder disease appendicitis among others less like this is dissection or PE at this time.    Plan  IV  Dilaudid  Zofran  Fluids  CT scan  Metabolic panel CBC CBC lipase urinalysis.    12:45 PM  She is reexamined.  Her belly is nontender she does have a very firm hernia that appears to be still firm but minimally tender said that usually it is softer.    CT scan was reviewed she has dilated loops of small and large bowel.  Patient either appears of had a paralytic ileus or perhaps bowel obstruction.  Clinically she is improving states that when she went had CT scan and they pulled her up she felt some sort of release in her abdomen.  At this point perhaps there was some stricture that is improved perhaps this  is an ileus is getting better it is difficult to ascertain but with her history her findings there is a patient with incarcerated bowel in her bowel that apparently has adhesions in her umbilical hernia with distended abdomen pain and vomiting my recommendation is admit her to make sure that there is observation time to make sure she does not turn into necrotic bowel    Dr. Win is her surgeon was called at 1:30p and is  aware    FINAL IMPRESSION  1.  Suspected bowel obstruction  2.   3.      Electronically signed by: Andrew Duarte M.D., 9/5/2022 9:31 AM

## 2022-09-05 NOTE — ED NOTES
Pt roomed immediately. Pt diaphoretic, appears uncomfortable, subjective fever 2 days ago. BP taken on both arms. ERP notified.

## 2022-09-05 NOTE — ASSESSMENT & PLAN NOTE
Postoperative day #3  Abdominal binder  Pain management  Advancing diet to regular  NG tube is out  Patient overall doing better  Ambulate at least 3 times daily  Discharge planning most likely home tomorrow  Discussed with surgery Dr. Holley in detail

## 2022-09-06 ENCOUNTER — APPOINTMENT (OUTPATIENT)
Dept: RADIOLOGY | Facility: MEDICAL CENTER | Age: 57
DRG: 330 | End: 2022-09-06
Attending: HOSPITALIST
Payer: MEDICAID

## 2022-09-06 LAB
ALBUMIN SERPL BCP-MCNC: 3.4 G/DL (ref 3.2–4.9)
ALBUMIN/GLOB SERPL: 1.1 G/DL
ALP SERPL-CCNC: 70 U/L (ref 30–99)
ALT SERPL-CCNC: 41 U/L (ref 2–50)
ANION GAP SERPL CALC-SCNC: 12 MMOL/L (ref 7–16)
AST SERPL-CCNC: 29 U/L (ref 12–45)
BILIRUB SERPL-MCNC: 0.6 MG/DL (ref 0.1–1.5)
BUN SERPL-MCNC: 10 MG/DL (ref 8–22)
CALCIUM SERPL-MCNC: 8.6 MG/DL (ref 8.4–10.2)
CHLORIDE SERPL-SCNC: 100 MMOL/L (ref 96–112)
CO2 SERPL-SCNC: 24 MMOL/L (ref 20–33)
CREAT SERPL-MCNC: 0.42 MG/DL (ref 0.5–1.4)
ERYTHROCYTE [DISTWIDTH] IN BLOOD BY AUTOMATED COUNT: 52 FL (ref 35.9–50)
GFR SERPLBLD CREATININE-BSD FMLA CKD-EPI: 115 ML/MIN/1.73 M 2
GLOBULIN SER CALC-MCNC: 3.1 G/DL (ref 1.9–3.5)
GLUCOSE BLD STRIP.AUTO-MCNC: 148 MG/DL (ref 65–99)
GLUCOSE BLD STRIP.AUTO-MCNC: 150 MG/DL (ref 65–99)
GLUCOSE BLD STRIP.AUTO-MCNC: 161 MG/DL (ref 65–99)
GLUCOSE BLD STRIP.AUTO-MCNC: 167 MG/DL (ref 65–99)
GLUCOSE SERPL-MCNC: 153 MG/DL (ref 65–99)
HCT VFR BLD AUTO: 41.3 % (ref 37–47)
HGB BLD-MCNC: 13.1 G/DL (ref 12–16)
MCH RBC QN AUTO: 25.5 PG (ref 27–33)
MCHC RBC AUTO-ENTMCNC: 31.7 G/DL (ref 33.6–35)
MCV RBC AUTO: 80.4 FL (ref 81.4–97.8)
PLATELET # BLD AUTO: 235 K/UL (ref 164–446)
PMV BLD AUTO: 9.8 FL (ref 9–12.9)
POTASSIUM SERPL-SCNC: 3.1 MMOL/L (ref 3.6–5.5)
PROT SERPL-MCNC: 6.5 G/DL (ref 6–8.2)
RBC # BLD AUTO: 5.14 M/UL (ref 4.2–5.4)
SODIUM SERPL-SCNC: 136 MMOL/L (ref 135–145)
WBC # BLD AUTO: 7.4 K/UL (ref 4.8–10.8)

## 2022-09-06 PROCEDURE — 94760 N-INVAS EAR/PLS OXIMETRY 1: CPT

## 2022-09-06 PROCEDURE — 700102 HCHG RX REV CODE 250 W/ 637 OVERRIDE(OP): Performed by: INTERNAL MEDICINE

## 2022-09-06 PROCEDURE — 85027 COMPLETE CBC AUTOMATED: CPT

## 2022-09-06 PROCEDURE — 36415 COLL VENOUS BLD VENIPUNCTURE: CPT

## 2022-09-06 PROCEDURE — 700111 HCHG RX REV CODE 636 W/ 250 OVERRIDE (IP): Performed by: INTERNAL MEDICINE

## 2022-09-06 PROCEDURE — 770006 HCHG ROOM/CARE - MED/SURG/GYN SEMI*

## 2022-09-06 PROCEDURE — 99232 SBSQ HOSP IP/OBS MODERATE 35: CPT | Performed by: HOSPITALIST

## 2022-09-06 PROCEDURE — 80053 COMPREHEN METABOLIC PANEL: CPT

## 2022-09-06 PROCEDURE — 700111 HCHG RX REV CODE 636 W/ 250 OVERRIDE (IP): Performed by: HOSPITALIST

## 2022-09-06 PROCEDURE — 700117 HCHG RX CONTRAST REV CODE 255: Performed by: HOSPITALIST

## 2022-09-06 PROCEDURE — 82962 GLUCOSE BLOOD TEST: CPT

## 2022-09-06 PROCEDURE — 700111 HCHG RX REV CODE 636 W/ 250 OVERRIDE (IP): Performed by: SURGERY

## 2022-09-06 RX ORDER — POTASSIUM CHLORIDE 7.45 MG/ML
10 INJECTION INTRAVENOUS
Status: COMPLETED | OUTPATIENT
Start: 2022-09-06 | End: 2022-09-06

## 2022-09-06 RX ORDER — OXYCODONE HYDROCHLORIDE 5 MG/1
5 TABLET ORAL EVERY 4 HOURS PRN
Status: DISCONTINUED | OUTPATIENT
Start: 2022-09-06 | End: 2022-09-11

## 2022-09-06 RX ORDER — HYDROMORPHONE HYDROCHLORIDE 1 MG/ML
0.5 INJECTION, SOLUTION INTRAMUSCULAR; INTRAVENOUS; SUBCUTANEOUS
Status: DISCONTINUED | OUTPATIENT
Start: 2022-09-06 | End: 2022-09-06

## 2022-09-06 RX ORDER — HYDROMORPHONE HYDROCHLORIDE 1 MG/ML
0.5 INJECTION, SOLUTION INTRAMUSCULAR; INTRAVENOUS; SUBCUTANEOUS
Status: DISCONTINUED | OUTPATIENT
Start: 2022-09-06 | End: 2022-09-11

## 2022-09-06 RX ORDER — OXYCODONE HYDROCHLORIDE 10 MG/1
10 TABLET ORAL
Status: DISCONTINUED | OUTPATIENT
Start: 2022-09-06 | End: 2022-09-11

## 2022-09-06 RX ADMIN — PROCHLORPERAZINE EDISYLATE 10 MG: 5 INJECTION, SOLUTION INTRAMUSCULAR; INTRAVENOUS at 16:49

## 2022-09-06 RX ADMIN — HEPARIN SODIUM 5000 UNITS: 5000 INJECTION, SOLUTION INTRAVENOUS; SUBCUTANEOUS at 15:07

## 2022-09-06 RX ADMIN — HYDROMORPHONE HYDROCHLORIDE 1 MG: 1 INJECTION, SOLUTION INTRAMUSCULAR; INTRAVENOUS; SUBCUTANEOUS at 06:15

## 2022-09-06 RX ADMIN — HYDROMORPHONE HYDROCHLORIDE 0.5 MG: 1 INJECTION, SOLUTION INTRAMUSCULAR; INTRAVENOUS; SUBCUTANEOUS at 08:29

## 2022-09-06 RX ADMIN — ONDANSETRON 4 MG: 2 INJECTION INTRAMUSCULAR; INTRAVENOUS at 19:25

## 2022-09-06 RX ADMIN — PROCHLORPERAZINE EDISYLATE 10 MG: 5 INJECTION, SOLUTION INTRAMUSCULAR; INTRAVENOUS at 21:54

## 2022-09-06 RX ADMIN — HYDROMORPHONE HYDROCHLORIDE 0.5 MG: 1 INJECTION, SOLUTION INTRAMUSCULAR; INTRAVENOUS; SUBCUTANEOUS at 15:17

## 2022-09-06 RX ADMIN — HYDROMORPHONE HYDROCHLORIDE 0.5 MG: 1 INJECTION, SOLUTION INTRAMUSCULAR; INTRAVENOUS; SUBCUTANEOUS at 00:09

## 2022-09-06 RX ADMIN — HYDROMORPHONE HYDROCHLORIDE 1 MG: 1 INJECTION, SOLUTION INTRAMUSCULAR; INTRAVENOUS; SUBCUTANEOUS at 03:14

## 2022-09-06 RX ADMIN — POTASSIUM CHLORIDE 10 MEQ: 7.46 INJECTION, SOLUTION INTRAVENOUS at 15:07

## 2022-09-06 RX ADMIN — HYDROMORPHONE HYDROCHLORIDE 0.5 MG: 1 INJECTION, SOLUTION INTRAMUSCULAR; INTRAVENOUS; SUBCUTANEOUS at 19:53

## 2022-09-06 RX ADMIN — HYDROMORPHONE HYDROCHLORIDE 0.5 MG: 1 INJECTION, SOLUTION INTRAMUSCULAR; INTRAVENOUS; SUBCUTANEOUS at 12:27

## 2022-09-06 RX ADMIN — IOHEXOL 300 ML: 350 INJECTION, SOLUTION INTRAVENOUS at 12:21

## 2022-09-06 RX ADMIN — POTASSIUM CHLORIDE 10 MEQ: 7.46 INJECTION, SOLUTION INTRAVENOUS at 16:51

## 2022-09-06 ASSESSMENT — ENCOUNTER SYMPTOMS
COUGH: 0
VOMITING: 0
PALPITATIONS: 0
DIARRHEA: 0
CONSTIPATION: 0
NERVOUS/ANXIOUS: 0
DIZZINESS: 0
HEADACHES: 0
CARDIOVASCULAR NEGATIVE: 1
HEMOPTYSIS: 0
MUSCULOSKELETAL NEGATIVE: 1
NEUROLOGICAL NEGATIVE: 1
FOCAL WEAKNESS: 0
EYES NEGATIVE: 1
VOMITING: 1
CONSTIPATION: 1
RESPIRATORY NEGATIVE: 1
PSYCHIATRIC NEGATIVE: 1
BLOOD IN STOOL: 0
WHEEZING: 0
NAUSEA: 0
NAUSEA: 1
ABDOMINAL PAIN: 1
LOSS OF CONSCIOUSNESS: 0
CHILLS: 0
BRUISES/BLEEDS EASILY: 0
DIAPHORESIS: 0
HEARTBURN: 0
FEVER: 0
SEIZURES: 0
DOUBLE VISION: 0

## 2022-09-06 ASSESSMENT — PAIN DESCRIPTION - PAIN TYPE
TYPE: ACUTE PAIN

## 2022-09-06 ASSESSMENT — PATIENT HEALTH QUESTIONNAIRE - PHQ9
SUM OF ALL RESPONSES TO PHQ9 QUESTIONS 1 AND 2: 0
1. LITTLE INTEREST OR PLEASURE IN DOING THINGS: NOT AT ALL
2. FEELING DOWN, DEPRESSED, IRRITABLE, OR HOPELESS: NOT AT ALL

## 2022-09-06 NOTE — PROGRESS NOTES
Surgery Progress Note               Author: Myranda Pepper M.D. Date & Time created: 9/6/2022  7:50 AM     Interval History:  Feeling much better overall, but still having some pain. Low dose narcotic scale not helping much. Had difficulty sleeping last night. BM x1, no flatus yet.     Review of Systems:  Review of Systems   Constitutional:  Negative for chills and fever.   Gastrointestinal:  Positive for abdominal pain. Negative for constipation, diarrhea, nausea and vomiting.     Physical Exam:  Physical Exam  Constitutional:       Appearance: Normal appearance.   HENT:      Head: Normocephalic and atraumatic.      Right Ear: External ear normal.      Left Ear: External ear normal.      Mouth/Throat:      Mouth: Mucous membranes are moist.   Eyes:      Extraocular Movements: Extraocular movements intact.   Cardiovascular:      Rate and Rhythm: Normal rate and regular rhythm.   Pulmonary:      Effort: Pulmonary effort is normal.   Abdominal:      General: There is distension.      Tenderness: There is abdominal tenderness.      Comments: Mild tenderness left upper quadrant, hernia not tender to palpation.    Skin:     General: Skin is warm and dry.      Capillary Refill: Capillary refill takes less than 2 seconds.   Neurological:      General: No focal deficit present.      Mental Status: She is alert and oriented to person, place, and time.   Psychiatric:         Mood and Affect: Mood normal.         Behavior: Behavior normal.       Labs:          Recent Labs     09/05/22  0853 09/06/22  0116   SODIUM 134* 136   POTASSIUM 3.3* 3.1*   CHLORIDE 95* 100   CO2 21 24   BUN 12 10   CREATININE 0.52 0.42*   MAGNESIUM 1.8  --    CALCIUM 9.5 8.6     Recent Labs     09/05/22  0853 09/06/22  0116   ALTSGPT 33 41   ASTSGOT 30 29   ALKPHOSPHAT 70 70   TBILIRUBIN 0.7 0.6   LIPASE 11  --    GLUCOSE 229* 153*     Recent Labs     09/05/22  0853 09/06/22  0116   RBC 6.09* 5.14   HEMOGLOBIN 15.5 13.1   HEMATOCRIT 47.6* 41.3    PLATELETCT 308 235     Recent Labs     22  0853 22  0116   WBC 13.0* 7.4   NEUTSPOLYS 83.50*  --    LYMPHOCYTES 9.70*  --    MONOCYTES 6.00  --    EOSINOPHILS 0.10  --    BASOPHILS 0.20  --    ASTSGOT 30 29   ALTSGPT 33 41   ALKPHOSPHAT 70 70   TBILIRUBIN 0.7 0.6     Hemodynamics:  Temp (24hrs), Av.6 °C (97.9 °F), Min:36.4 °C (97.5 °F), Max:36.8 °C (98.3 °F)  Temperature: 36.7 °C (98 °F)  Pulse  Av.8  Min: 72  Max: 98   Blood Pressure: 127/70     Respiratory:    Respiration: 18, Pulse Oximetry: 91 %           Fluids:  No intake or output data in the 24 hours ending 22 0750  Weight: (!) 131 kg (287 lb 14.7 oz)  GI/Nutrition:  Orders Placed This Encounter   Procedures    Diet NPO Restrict to: Ice Chips     Standing Status:   Standing     Number of Occurrences:   1     Order Specific Question:   Diet NPO Restrict to:     Answer:   Ice Chips [2]     Medical Decision Making, by Problem:  Active Hospital Problems    Diagnosis     *Small bowel obstruction (HCC) [K56.609]     DM (diabetes mellitus) (HCC) [E11.9]     Primary hypertension [I10]     Morbid obesity with BMI of 50.0-59.9, adult (HCC) [E66.01, Z68.43]     Ventral hernia [K43.9]     SIRS (systemic inflammatory response syndrome) (HCC) [R65.10]     Hyponatremia [E87.1]     Hypokalemia [E87.6]        Plan:  Small bowel follow through today.   Increased pain medications to 5-10 oxycodone q4 hour (from 2.5-5mg) and dilaudid 0.5mg q2 hour.   Will follow.     Quality-Core Measures

## 2022-09-06 NOTE — CARE PLAN
The patient is Stable - Low risk of patient condition declining or worsening    Shift Goals  Clinical Goals: Monitor NG; Pain at or below 4\10  Patient Goals: rest and comfort    Progress made toward(s) clinical / shift goals:    Problem: Pain - Standard  Goal: Alleviation of pain or a reduction in pain to the patient’s comfort goal  9/6/2022 0345 by Alexandra Shelley R.N.  Outcome: Progressing  9/6/2022 0345 by Alexandra Shelley R.N.  Outcome: Progressing     Problem: Knowledge Deficit - Standard  Goal: Patient and family/care givers will demonstrate understanding of plan of care, disease process/condition, diagnostic tests and medications  9/6/2022 0345 by Alexandra Shelley R.N.  Outcome: Progressing  9/6/2022 0345 by Alexandra Shelley R.N.  Outcome: Progressing     Problem: Bowel Elimination  Goal: Establish and maintain regular bowel function  9/6/2022 0345 by ODESSA MacN.  Outcome: Progressing  9/6/2022 0345 by ODESSA MacN.  Outcome: Progressing     Problem: Gastrointestinal Irritability  Goal: Nausea and vomiting will be absent or improve  9/6/2022 0345 by Alexandra Shelley R.N.  Outcome: Progressing  9/6/2022 0345 by ODESSA MacN.  Outcome: Progressing       Patient is not progressing towards the following goals:

## 2022-09-06 NOTE — DIETARY
NUTRITION SERVICES: BMI - Pt with BMI >40 (=Body mass index is 54.4 kg/m².), Class III obesity. Weight loss counseling not appropriate in acute care setting. RECOMMEND - If appropriate at DC please refer to outpatient nutrition services for weight management.

## 2022-09-06 NOTE — PROGRESS NOTES
Pt sitting upright in bed when received, grimacing in pain. Medication was given per scale and doctor made aware of need to increase dose. Pt verbalizes needs well and demonstrates use of call bell. Call bell in reach    Chart check completed    Dr Trinidad responded; increased Oxy to 5mg, and dilaudid 0.5-1mg

## 2022-09-06 NOTE — DISCHARGE PLANNING
Case Management Discharge Planning    Admission Date: 9/5/2022  GMLOS: 2.7 (Value from CMS.gov Table.)  ALOS: 1    6-Clicks ADL Score: 24  6-Clicks Mobility Score: 24      Anticipated Discharge Dispo: Discharge Disposition: Discharged to home/self care (01)    DME Needed: No    Action(s) Taken: Updated Provider/Nurse on Discharge Plan    No anticipated DC needs at this time. RN CM will continue to follow.     Escalations Completed: None    Medically Clear: No    Next Steps: Medical clearance    Barriers to Discharge: Medical clearance

## 2022-09-06 NOTE — PROGRESS NOTES
[facility] Medicine Daily Progress Note    Date of Service  9/6/2022    Chief Complaint  Jordyn Rick is a 57 y.o. female admitted 9/5/2022 with nausea vomiting and abdominal pain    Hospital Course  Jordyn is a 57-year-old female who has a ventral hernia and morbid obesity developed severe nausea vomiting at a camping trip.  The patient afterwards was not able to move her bowels.  Patient has not had a bowel movement since then.  Her camping trip was on Friday night.  She came to the hospital on Saturday.  The patient's obstruction has not resolved.  The patient does have a ventral hernia with intestinal obstruction.  NG tube is in place to intermittent suctioning.  Patient is given fluid resuscitation and electrolyte replacement.  She will need a small bowel follow-through to see if she is actually passing stool through all the way to the rectum.    Interval Problem Update  Replace electrolytes  Monitor potassium  Small bowel follow-through  N.p.o. status  NG tube to suction  Fluid resuscitation  Discharge planning    I have discussed this patient's plan of care and discharge plan at IDT rounds today with Case Management, Nursing, Nursing leadership, and other members of the IDT team.    Consultants/Specialty  general surgery    Code Status  Full Code    Disposition  Patient is not medically cleared for discharge.   Anticipate discharge to to home with close outpatient follow-up.  I have placed the appropriate orders for post-discharge needs.    Review of Systems  Review of Systems   Constitutional:  Positive for malaise/fatigue. Negative for chills, diaphoresis and fever.   HENT: Negative.     Eyes: Negative.  Negative for double vision.   Respiratory: Negative.  Negative for cough, hemoptysis and wheezing.    Cardiovascular: Negative.  Negative for chest pain, palpitations and leg swelling.   Gastrointestinal:  Positive for abdominal pain, constipation, nausea and vomiting. Negative for blood in stool, diarrhea and  heartburn.   Genitourinary: Negative.  Negative for frequency, hematuria and urgency.   Musculoskeletal: Negative.  Negative for joint pain.   Skin: Negative.  Negative for itching and rash.   Neurological: Negative.  Negative for dizziness, focal weakness, seizures, loss of consciousness and headaches.   Endo/Heme/Allergies: Negative.  Does not bruise/bleed easily.   Psychiatric/Behavioral: Negative.  Negative for suicidal ideas. The patient is not nervous/anxious.    All other systems reviewed and are negative.     Physical Exam  Temp:  [36.4 °C (97.5 °F)-36.8 °C (98.3 °F)] 36.8 °C (98.3 °F)  Pulse:  [72-85] 83  Resp:  [16-18] 18  BP: (127-151)/(69-81) 150/74  SpO2:  [90 %-95 %] 95 %    Physical Exam  Vitals and nursing note reviewed. Exam conducted with a chaperone present.   Constitutional:       Appearance: She is well-developed. She is obese. She is ill-appearing.   HENT:      Head: Normocephalic and atraumatic.      Right Ear: Tympanic membrane and external ear normal.      Left Ear: Tympanic membrane and external ear normal.      Nose: Nose normal.      Mouth/Throat:      Mouth: Mucous membranes are moist.      Pharynx: Oropharynx is clear.   Eyes:      Extraocular Movements: Extraocular movements intact.      Conjunctiva/sclera: Conjunctivae normal.      Pupils: Pupils are equal, round, and reactive to light.   Neck:      Thyroid: No thyromegaly.      Vascular: No JVD.   Cardiovascular:      Rate and Rhythm: Normal rate and regular rhythm.      Pulses: Normal pulses.      Heart sounds: Normal heart sounds.   Pulmonary:      Effort: Pulmonary effort is normal.      Breath sounds: Normal breath sounds.   Chest:      Chest wall: No tenderness.   Abdominal:      General: Abdomen is flat. Bowel sounds are increased. There is no distension.      Palpations: Abdomen is soft. There is no mass.      Tenderness: There is abdominal tenderness in the periumbilical area. There is no guarding or rebound.      Hernia: A  hernia is present. Hernia is present in the ventral area.   Musculoskeletal:         General: Normal range of motion.      Cervical back: Normal range of motion and neck supple.      Right lower leg: No edema.      Left lower leg: No edema.   Lymphadenopathy:      Cervical: No cervical adenopathy.   Skin:     General: Skin is warm and dry.      Capillary Refill: Capillary refill takes less than 2 seconds.      Coloration: Skin is not jaundiced.      Findings: No lesion or rash.   Neurological:      General: No focal deficit present.      Mental Status: She is alert and oriented to person, place, and time. Mental status is at baseline.      Cranial Nerves: No cranial nerve deficit.      Deep Tendon Reflexes: Reflexes are normal and symmetric.   Psychiatric:         Mood and Affect: Mood normal.         Behavior: Behavior normal.         Thought Content: Thought content normal.         Judgment: Judgment normal.       Fluids  No intake or output data in the 24 hours ending 09/06/22 1420    Laboratory  Recent Labs     09/05/22  0853 09/06/22  0116   WBC 13.0* 7.4   RBC 6.09* 5.14   HEMOGLOBIN 15.5 13.1   HEMATOCRIT 47.6* 41.3   MCV 78.2* 80.4*   MCH 25.5* 25.5*   MCHC 32.6* 31.7*   RDW 50.3* 52.0*   PLATELETCT 308 235   MPV 9.7 9.8     Recent Labs     09/05/22  0853 09/06/22  0116   SODIUM 134* 136   POTASSIUM 3.3* 3.1*   CHLORIDE 95* 100   CO2 21 24   GLUCOSE 229* 153*   BUN 12 10   CREATININE 0.52 0.42*   CALCIUM 9.5 8.6                   Imaging  DX-ABDOMEN FOR TUBE PLACEMENT   Final Result      NG tube extends below the diaphragm with tip at the level of the gastric fundus.      CT-ABDOMEN-PELVIS WITH   Final Result      1.  Large midline lower abdominal ventral hernia containing colonic and small bowel loops, likely causing distal small bowel obstruction.   2.  No evidence of bowel perforation.   3.  Small volume ascites.   4.  Enlarged fatty liver.   5.  Stable nonspecific LEFT adrenal nodule.      DX-SMALL BOWEL  SERIES    (Results Pending)        Assessment/Plan  * Small bowel obstruction (HCC)- (present on admission)  Assessment & Plan  Patient went camping when she developed a most likely viral infection.  The patient has severe vomiting to the point where she got so dehydrated her hernia became constipated and obstructed.  NG tube in place  Small bowel follow-through ordered  N.p.o. for now  Pain management  Surgical input appreciated    Ventral hernia- (present on admission)  Assessment & Plan  Patient follows with surgery Dr. Win as an outpatient  Continue pain management  Small bowel follow-through ordered    Hypokalemia- (present on admission)  Assessment & Plan  Secondary to nausea vomiting  Low at 3.1  Continue with IV replacement    Hyponatremia- (present on admission)  Assessment & Plan  Most likely secondary to dehydration  With fluid resuscitation has corrected and is now 136    SIRS (systemic inflammatory response syndrome) ([facility])- (present on admission)  Assessment & Plan  Secondary to small bowel obstruction    Primary hypertension- (present on admission)  Assessment & Plan  Optimize blood pressure management  Currently patient will be on IV antihypertensives given n.p.o. status    DM (diabetes mellitus) ([facility])- (present on admission)  Assessment & Plan  -accus with sliding scale coverage  -N.p.o. for now  -diabetic education  -follow glycohemoglobin levels long term  -Hold all oral antihyperglycemic's  -monitor for hypoglycemic episodes and adjust control if he should get low    Morbid obesity with BMI of 50.0-59.9, adult ([facility])- (present on admission)  Assessment & Plan  Body mass index is 54.4 kg/m².  Outpatient weight loss management as well as lifestyle modification highly recommended.         VTE prophylaxis: SCDs/TEDs    I have performed a physical exam and reviewed and updated ROS and Plan today (9/6/2022). In review of yesterday's note (9/5/2022), there are no changes except as documented  above.

## 2022-09-06 NOTE — PROGRESS NOTES
Accidentally threw container of dilaudid into medical waste without scanning it into the system, MITRA Vaughan witnessed waste

## 2022-09-07 ENCOUNTER — APPOINTMENT (OUTPATIENT)
Dept: RADIOLOGY | Facility: MEDICAL CENTER | Age: 57
DRG: 330 | End: 2022-09-07
Attending: HOSPITALIST
Payer: MEDICAID

## 2022-09-07 LAB
ANION GAP SERPL CALC-SCNC: 15 MMOL/L (ref 7–16)
BUN SERPL-MCNC: 10 MG/DL (ref 8–22)
CALCIUM SERPL-MCNC: 8.9 MG/DL (ref 8.4–10.2)
CHLORIDE SERPL-SCNC: 101 MMOL/L (ref 96–112)
CO2 SERPL-SCNC: 25 MMOL/L (ref 20–33)
CREAT SERPL-MCNC: 0.4 MG/DL (ref 0.5–1.4)
GFR SERPLBLD CREATININE-BSD FMLA CKD-EPI: 116 ML/MIN/1.73 M 2
GLUCOSE BLD STRIP.AUTO-MCNC: 149 MG/DL (ref 65–99)
GLUCOSE BLD STRIP.AUTO-MCNC: 164 MG/DL (ref 65–99)
GLUCOSE BLD STRIP.AUTO-MCNC: 181 MG/DL (ref 65–99)
GLUCOSE BLD STRIP.AUTO-MCNC: 235 MG/DL (ref 65–99)
GLUCOSE SERPL-MCNC: 197 MG/DL (ref 65–99)
POTASSIUM SERPL-SCNC: 3.2 MMOL/L (ref 3.6–5.5)
SODIUM SERPL-SCNC: 141 MMOL/L (ref 135–145)

## 2022-09-07 PROCEDURE — 700111 HCHG RX REV CODE 636 W/ 250 OVERRIDE (IP): Performed by: INTERNAL MEDICINE

## 2022-09-07 PROCEDURE — 82962 GLUCOSE BLOOD TEST: CPT

## 2022-09-07 PROCEDURE — 36415 COLL VENOUS BLD VENIPUNCTURE: CPT

## 2022-09-07 PROCEDURE — 80048 BASIC METABOLIC PNL TOTAL CA: CPT

## 2022-09-07 PROCEDURE — 700111 HCHG RX REV CODE 636 W/ 250 OVERRIDE (IP): Performed by: HOSPITALIST

## 2022-09-07 PROCEDURE — 74250 X-RAY XM SM INT 1CNTRST STD: CPT

## 2022-09-07 PROCEDURE — 700105 HCHG RX REV CODE 258: Performed by: INTERNAL MEDICINE

## 2022-09-07 PROCEDURE — 99232 SBSQ HOSP IP/OBS MODERATE 35: CPT | Performed by: HOSPITALIST

## 2022-09-07 PROCEDURE — 94760 N-INVAS EAR/PLS OXIMETRY 1: CPT

## 2022-09-07 PROCEDURE — 770006 HCHG ROOM/CARE - MED/SURG/GYN SEMI*

## 2022-09-07 RX ORDER — POTASSIUM CHLORIDE 7.45 MG/ML
10 INJECTION INTRAVENOUS
Status: COMPLETED | OUTPATIENT
Start: 2022-09-07 | End: 2022-09-07

## 2022-09-07 RX ADMIN — PROCHLORPERAZINE EDISYLATE 10 MG: 5 INJECTION, SOLUTION INTRAMUSCULAR; INTRAVENOUS at 09:29

## 2022-09-07 RX ADMIN — SODIUM CHLORIDE: 9 INJECTION, SOLUTION INTRAVENOUS at 12:51

## 2022-09-07 RX ADMIN — POTASSIUM CHLORIDE 10 MEQ: 7.46 INJECTION, SOLUTION INTRAVENOUS at 16:16

## 2022-09-07 RX ADMIN — HYDROMORPHONE HYDROCHLORIDE 0.5 MG: 1 INJECTION, SOLUTION INTRAMUSCULAR; INTRAVENOUS; SUBCUTANEOUS at 14:56

## 2022-09-07 RX ADMIN — POTASSIUM CHLORIDE 10 MEQ: 7.46 INJECTION, SOLUTION INTRAVENOUS at 18:32

## 2022-09-07 RX ADMIN — POTASSIUM CHLORIDE 10 MEQ: 7.46 INJECTION, SOLUTION INTRAVENOUS at 14:55

## 2022-09-07 RX ADMIN — HYDROMORPHONE HYDROCHLORIDE 0.5 MG: 1 INJECTION, SOLUTION INTRAMUSCULAR; INTRAVENOUS; SUBCUTANEOUS at 18:31

## 2022-09-07 RX ADMIN — HYDROMORPHONE HYDROCHLORIDE 0.5 MG: 1 INJECTION, SOLUTION INTRAMUSCULAR; INTRAVENOUS; SUBCUTANEOUS at 09:29

## 2022-09-07 RX ADMIN — HYDROMORPHONE HYDROCHLORIDE 0.5 MG: 1 INJECTION, SOLUTION INTRAMUSCULAR; INTRAVENOUS; SUBCUTANEOUS at 12:50

## 2022-09-07 RX ADMIN — PROCHLORPERAZINE EDISYLATE 10 MG: 5 INJECTION, SOLUTION INTRAMUSCULAR; INTRAVENOUS at 03:07

## 2022-09-07 RX ADMIN — HYDROMORPHONE HYDROCHLORIDE 0.5 MG: 1 INJECTION, SOLUTION INTRAMUSCULAR; INTRAVENOUS; SUBCUTANEOUS at 06:13

## 2022-09-07 RX ADMIN — POTASSIUM CHLORIDE 10 MEQ: 7.46 INJECTION, SOLUTION INTRAVENOUS at 17:31

## 2022-09-07 RX ADMIN — HYDROMORPHONE HYDROCHLORIDE 0.5 MG: 1 INJECTION, SOLUTION INTRAMUSCULAR; INTRAVENOUS; SUBCUTANEOUS at 21:44

## 2022-09-07 RX ADMIN — HYDROMORPHONE HYDROCHLORIDE 0.5 MG: 1 INJECTION, SOLUTION INTRAMUSCULAR; INTRAVENOUS; SUBCUTANEOUS at 00:00

## 2022-09-07 RX ADMIN — HYDROMORPHONE HYDROCHLORIDE 0.5 MG: 1 INJECTION, SOLUTION INTRAMUSCULAR; INTRAVENOUS; SUBCUTANEOUS at 03:06

## 2022-09-07 ASSESSMENT — ENCOUNTER SYMPTOMS
STRIDOR: 0
EYE DISCHARGE: 0
NERVOUS/ANXIOUS: 0
ORTHOPNEA: 0
VOMITING: 1
SPUTUM PRODUCTION: 0
DIAPHORESIS: 0
CLAUDICATION: 0
CONSTIPATION: 1
COUGH: 0
PSYCHIATRIC NEGATIVE: 1
LOSS OF CONSCIOUSNESS: 0
DIZZINESS: 0
NECK PAIN: 0
FALLS: 0
MUSCULOSKELETAL NEGATIVE: 1
BRUISES/BLEEDS EASILY: 0
EYES NEGATIVE: 1
CARDIOVASCULAR NEGATIVE: 1
EYE PAIN: 0
HEARTBURN: 0
DEPRESSION: 0
HEADACHES: 0
FLANK PAIN: 0
FOCAL WEAKNESS: 0
SHORTNESS OF BREATH: 0
SENSORY CHANGE: 0
MYALGIAS: 0
BLOOD IN STOOL: 0
SEIZURES: 0
NAUSEA: 1
ABDOMINAL PAIN: 1
TREMORS: 0
DOUBLE VISION: 0
WEIGHT LOSS: 0
RESPIRATORY NEGATIVE: 1
NEUROLOGICAL NEGATIVE: 1
DIARRHEA: 0
FEVER: 0

## 2022-09-07 ASSESSMENT — LIFESTYLE VARIABLES: SUBSTANCE_ABUSE: 0

## 2022-09-07 ASSESSMENT — PAIN DESCRIPTION - PAIN TYPE
TYPE: ACUTE PAIN

## 2022-09-07 NOTE — PROGRESS NOTES
small bowel series ended per Dr. Pepper. Ok to hook pt back up to suction. Plan for OR possibly tomorrow.   Dr. Wheeler updated.   Radiology Tech Dyana notified via voalte.

## 2022-09-07 NOTE — PROGRESS NOTES
Hospital Medicine Daily Progress Note    Date of Service  9/7/2022    Chief Complaint  Jordyn Rick is a 57 y.o. female admitted 9/5/2022 with nausea vomiting and abdominal pain    Hospital Course  9/6/2022-Jordyn is a 57-year-old female who has a ventral hernia and morbid obesity developed severe nausea vomiting at a camping trip.  The patient afterwards was not able to move her bowels.  Patient has not had a bowel movement since then.  Her camping trip was on Friday night.  She came to the hospital on Saturday.  The patient's obstruction has not resolved.  The patient does have a ventral hernia with intestinal obstruction.  NG tube is in place to intermittent suctioning.  Patient is given fluid resuscitation and electrolyte replacement.  She will need a small bowel follow-through to see if she is actually passing stool through all the way to the rectum.  9/7/2022-patient's small bowel follow-through shows an obstruction at the ventral hernia.  The patient at this point will need surgical intervention.  I discussed this with surgery and they will be planning on taking the patient to surgery tomorrow.  In the meantime continue n.p.o. status and NG tube suctioning.    Interval Problem Update  Potassium still low replace with IV potassium  Continue with fluid resuscitation  Pain management  N.p.o. status  Surgery planned for tomorrow  Discharge planning    I have discussed this patient's plan of care and discharge plan at IDT rounds today with Case Management, Nursing, Nursing leadership, and other members of the IDT team.    Consultants/Specialty  general surgery    Code Status  Full Code    Disposition  Patient is not medically cleared for discharge.   Anticipate discharge to to home with close outpatient follow-up.  I have placed the appropriate orders for post-discharge needs.    Review of Systems  Review of Systems   Constitutional:  Positive for malaise/fatigue. Negative for diaphoresis, fever and weight loss.    HENT: Negative.  Negative for ear pain and tinnitus.    Eyes: Negative.  Negative for double vision, pain and discharge.   Respiratory: Negative.  Negative for cough, sputum production, shortness of breath and stridor.    Cardiovascular: Negative.  Negative for chest pain, orthopnea, claudication and leg swelling.   Gastrointestinal:  Positive for abdominal pain, constipation, nausea and vomiting. Negative for blood in stool, diarrhea and heartburn.   Genitourinary: Negative.  Negative for dysuria and flank pain.   Musculoskeletal: Negative.  Negative for falls, myalgias and neck pain.   Skin: Negative.  Negative for itching and rash.   Neurological: Negative.  Negative for dizziness, tremors, sensory change, focal weakness, seizures, loss of consciousness and headaches.   Endo/Heme/Allergies: Negative.  Does not bruise/bleed easily.   Psychiatric/Behavioral: Negative.  Negative for depression, substance abuse and suicidal ideas. The patient is not nervous/anxious.    All other systems reviewed and are negative.     Physical Exam  Temp:  [36.2 °C (97.2 °F)-36.7 °C (98 °F)] 36.7 °C (98 °F)  Pulse:  [81-97] 97  Resp:  [16-18] 18  BP: (140-153)/(72-90) 144/82  SpO2:  [91 %-93 %] 92 %    Physical Exam  Vitals and nursing note reviewed. Exam conducted with a chaperone present.   Constitutional:       Appearance: She is well-developed. She is obese. She is ill-appearing.   HENT:      Head: Normocephalic and atraumatic.      Right Ear: External ear normal.      Left Ear: External ear normal.      Nose: Nose normal. No congestion or rhinorrhea.      Mouth/Throat:      Mouth: Mucous membranes are moist.      Pharynx: Oropharynx is clear. No oropharyngeal exudate or posterior oropharyngeal erythema.   Eyes:      General:         Right eye: No discharge.         Left eye: No discharge.      Extraocular Movements: Extraocular movements intact.      Conjunctiva/sclera: Conjunctivae normal.      Pupils: Pupils are equal, round,  and reactive to light.   Neck:      Thyroid: No thyromegaly.      Vascular: No JVD.   Cardiovascular:      Rate and Rhythm: Normal rate and regular rhythm.      Pulses: Normal pulses.      Heart sounds: Normal heart sounds.   Pulmonary:      Effort: Pulmonary effort is normal.      Breath sounds: Normal breath sounds.   Chest:      Chest wall: No tenderness.   Abdominal:      General: Abdomen is flat. Bowel sounds are increased. There is no distension.      Palpations: Abdomen is soft. There is no mass.      Tenderness: There is generalized abdominal tenderness. There is no guarding or rebound.      Hernia: A hernia is present. Hernia is present in the ventral area.   Musculoskeletal:         General: No swelling. Normal range of motion.      Cervical back: Normal range of motion and neck supple.      Right lower leg: No edema.      Left lower leg: No edema.   Lymphadenopathy:      Cervical: No cervical adenopathy.   Skin:     General: Skin is warm and dry.      Capillary Refill: Capillary refill takes less than 2 seconds.      Coloration: Skin is not jaundiced or pale.      Findings: No lesion or rash.   Neurological:      General: No focal deficit present.      Mental Status: She is alert and oriented to person, place, and time. Mental status is at baseline.      Cranial Nerves: No cranial nerve deficit.      Deep Tendon Reflexes: Reflexes are normal and symmetric.   Psychiatric:         Mood and Affect: Mood normal.         Behavior: Behavior normal.         Thought Content: Thought content normal.         Judgment: Judgment normal.       Fluids    Intake/Output Summary (Last 24 hours) at 9/7/2022 1346  Last data filed at 9/7/2022 1000  Gross per 24 hour   Intake --   Output 900 ml   Net -900 ml       Laboratory  Recent Labs     09/05/22  0853 09/06/22  0116   WBC 13.0* 7.4   RBC 6.09* 5.14   HEMOGLOBIN 15.5 13.1   HEMATOCRIT 47.6* 41.3   MCV 78.2* 80.4*   MCH 25.5* 25.5*   MCHC 32.6* 31.7*   RDW 50.3* 52.0*    PLATELETCT 308 235   MPV 9.7 9.8     Recent Labs     09/05/22  0853 09/06/22  0116 09/07/22  0806   SODIUM 134* 136 141   POTASSIUM 3.3* 3.1* 3.2*   CHLORIDE 95* 100 101   CO2 21 24 25   GLUCOSE 229* 153* 197*   BUN 12 10 10   CREATININE 0.52 0.42* 0.40*   CALCIUM 9.5 8.6 8.9                   Imaging  DX-ABDOMEN FOR TUBE PLACEMENT   Final Result      NG tube tip overlies the gastric fundus.      DX-SMALL BOWEL SERIES   Final Result      Dilated small intestine. Administered contrast does not reach the colon at 20 hours consistent with high-grade small bowel obstruction.      DX-ABDOMEN FOR TUBE PLACEMENT   Final Result      NG tube extends below the diaphragm with tip at the level of the gastric fundus.      CT-ABDOMEN-PELVIS WITH   Final Result      1.  Large midline lower abdominal ventral hernia containing colonic and small bowel loops, likely causing distal small bowel obstruction.   2.  No evidence of bowel perforation.   3.  Small volume ascites.   4.  Enlarged fatty liver.   5.  Stable nonspecific LEFT adrenal nodule.           Assessment/Plan  * Small bowel obstruction (HCC)- (present on admission)  Assessment & Plan  Small bowel obstruction at this point is severe  The patient remembers eating rice prior to her camping trip and throwing up arise at the camping trip.  The bowel obstruction was most likely there for a camping trip for her.  Patient at this point has a hernia that seems to be the source of her obstruction.  Surgery will take her for surgery tomorrow morning.  N.p.o. status continues  Continue with NG tube suctioning.  The NG tube apparently came out today and had to be replaced.    Ventral hernia- (present on admission)  Assessment & Plan  Most likely the site of her obstruction  Surgery plans on taking patient tomorrow for surgery    Hypokalemia- (present on admission)  Assessment & Plan  Potassium still low I will give her IV potassium    Hyponatremia- (present on admission)  Assessment &  Plan  Corrected with fluid resuscitation    SIRS (systemic inflammatory response syndrome) ([facility])- (present on admission)  Assessment & Plan  Ostomy confirmation most likely secondary to the bowel obstruction and not a true infection.    Primary hypertension- (present on admission)  Assessment & Plan  Optimize blood pressure monitor keep systolic blood pressure less than 140 diastolic under 90    DM (diabetes mellitus) ([facility])- (present on admission)  Assessment & Plan  -accus with sliding scale coverage continued  -N.p.o. for now  -diabetic education  -follow glycohemoglobin levels long term, will need to evaluate hemoglobin A1c level  -Hold all oral antihyperglycemic's  -monitor for hypoglycemic episodes and adjust control if he should get low    Morbid obesity with BMI of 50.0-59.9, adult ([facility])- (present on admission)  Assessment & Plan  Body mass index is 54.4 kg/m².  Outpatient weight loss management as well as lifestyle modification highly recommended.       VTE prophylaxis: SCDs/TEDs    I have performed a physical exam and reviewed and updated ROS and Plan today (9/7/2022). In review of yesterday's note (9/6/2022), there are no changes except as documented above.

## 2022-09-07 NOTE — CARE PLAN
The patient is Watcher - Medium risk of patient condition declining or worsening    Shift Goals  Clinical Goals: Decrease abd pain  Patient Goals: rest and comfort    Progress made toward(s) clinical / shift goals:  pt was feeling better before small bowel series and clamping NG.     Patient is not progressing towards the following goals:    Increased pain during small bowel series.   Problem: Pain - Standard  Goal: Alleviation of pain or a reduction in pain to the patient’s comfort goal  Outcome: Not Progressing     Problem: Bowel Elimination  Goal: Establish and maintain regular bowel function  Outcome: Not Progressing

## 2022-09-07 NOTE — CARE PLAN
The patient is Watcher - Medium risk of patient condition declining or worsening    Shift Goals  Clinical Goals: pain level at or below 4\10  Patient Goals: rest and comfort    Progress made toward(s) clinical / shift goals:    Problem: Pain - Standard  Goal: Alleviation of pain or a reduction in pain to the patient’s comfort goal  Outcome: Progressing     Problem: Knowledge Deficit - Standard  Goal: Patient and family/care givers will demonstrate understanding of plan of care, disease process/condition, diagnostic tests and medications  Outcome: Progressing     Problem: Bowel Elimination  Goal: Establish and maintain regular bowel function  Outcome: Progressing     Problem: Gastrointestinal Irritability  Goal: Nausea and vomiting will be absent or improve  Outcome: Progressing       Patient is not progressing towards the following goals:

## 2022-09-07 NOTE — PROGRESS NOTES
Pt sitting on edge of bed when received. Pt states that her IV came out when she took herself to the bathroom, new IV was placed 22 g left hand and pt was educated to use the call bell when she needs to get up. Pt states that she is still feeling nauseated, Zofran 4 mg give IV. Pain level 7/10; medication given per scale.     Chart check completed

## 2022-09-08 ENCOUNTER — ANESTHESIA (OUTPATIENT)
Dept: SURGERY | Facility: MEDICAL CENTER | Age: 57
DRG: 330 | End: 2022-09-08
Payer: MEDICAID

## 2022-09-08 ENCOUNTER — ANESTHESIA EVENT (OUTPATIENT)
Dept: SURGERY | Facility: MEDICAL CENTER | Age: 57
DRG: 330 | End: 2022-09-08
Payer: MEDICAID

## 2022-09-08 LAB
GLUCOSE BLD STRIP.AUTO-MCNC: 125 MG/DL (ref 65–99)
GLUCOSE BLD STRIP.AUTO-MCNC: 140 MG/DL (ref 65–99)
GLUCOSE BLD STRIP.AUTO-MCNC: 191 MG/DL (ref 65–99)
GLUCOSE BLD STRIP.AUTO-MCNC: 196 MG/DL (ref 65–99)
PATHOLOGY CONSULT NOTE: NORMAL

## 2022-09-08 PROCEDURE — 770006 HCHG ROOM/CARE - MED/SURG/GYN SEMI*

## 2022-09-08 PROCEDURE — 700105 HCHG RX REV CODE 258: Performed by: SURGERY

## 2022-09-08 PROCEDURE — 0DBE0ZZ EXCISION OF LARGE INTESTINE, OPEN APPROACH: ICD-10-PCS | Performed by: SURGERY

## 2022-09-08 PROCEDURE — 700111 HCHG RX REV CODE 636 W/ 250 OVERRIDE (IP): Performed by: HOSPITALIST

## 2022-09-08 PROCEDURE — 700101 HCHG RX REV CODE 250: Performed by: ANESTHESIOLOGY

## 2022-09-08 PROCEDURE — 82962 GLUCOSE BLOOD TEST: CPT | Mod: 91

## 2022-09-08 PROCEDURE — 160002 HCHG RECOVERY MINUTES (STAT): Performed by: SURGERY

## 2022-09-08 PROCEDURE — 0WQF0ZZ REPAIR ABDOMINAL WALL, OPEN APPROACH: ICD-10-PCS | Performed by: SURGERY

## 2022-09-08 PROCEDURE — 160009 HCHG ANES TIME/MIN: Performed by: SURGERY

## 2022-09-08 PROCEDURE — 160048 HCHG OR STATISTICAL LEVEL 1-5: Performed by: SURGERY

## 2022-09-08 PROCEDURE — 160035 HCHG PACU - 1ST 60 MINS PHASE I: Performed by: SURGERY

## 2022-09-08 PROCEDURE — 88302 TISSUE EXAM BY PATHOLOGIST: CPT

## 2022-09-08 PROCEDURE — 700111 HCHG RX REV CODE 636 W/ 250 OVERRIDE (IP): Performed by: ANESTHESIOLOGY

## 2022-09-08 PROCEDURE — 160027 HCHG SURGERY MINUTES - 1ST 30 MINS LEVEL 2: Performed by: SURGERY

## 2022-09-08 PROCEDURE — 88307 TISSUE EXAM BY PATHOLOGIST: CPT

## 2022-09-08 PROCEDURE — 700111 HCHG RX REV CODE 636 W/ 250 OVERRIDE (IP): Performed by: INTERNAL MEDICINE

## 2022-09-08 PROCEDURE — 160036 HCHG PACU - EA ADDL 30 MINS PHASE I: Performed by: SURGERY

## 2022-09-08 PROCEDURE — 99232 SBSQ HOSP IP/OBS MODERATE 35: CPT | Performed by: HOSPITALIST

## 2022-09-08 PROCEDURE — 160038 HCHG SURGERY MINUTES - EA ADDL 1 MIN LEVEL 2: Performed by: SURGERY

## 2022-09-08 PROCEDURE — 110371 HCHG SHELL REV 272: Performed by: SURGERY

## 2022-09-08 PROCEDURE — 00840 ANES IPER PX LOWER ABD NOS: CPT | Performed by: ANESTHESIOLOGY

## 2022-09-08 RX ORDER — DIAZEPAM 5 MG/ML
5 INJECTION, SOLUTION INTRAMUSCULAR; INTRAVENOUS EVERY 4 HOURS PRN
Status: DISCONTINUED | OUTPATIENT
Start: 2022-09-08 | End: 2022-09-08

## 2022-09-08 RX ORDER — ALBUTEROL SULFATE 2.5 MG/3ML
2.5 SOLUTION RESPIRATORY (INHALATION)
Status: DISCONTINUED | OUTPATIENT
Start: 2022-09-08 | End: 2022-09-08 | Stop reason: HOSPADM

## 2022-09-08 RX ORDER — LABETALOL HYDROCHLORIDE 5 MG/ML
5 INJECTION, SOLUTION INTRAVENOUS
Status: DISCONTINUED | OUTPATIENT
Start: 2022-09-08 | End: 2022-09-08 | Stop reason: HOSPADM

## 2022-09-08 RX ORDER — KETAMINE HCL 50MG/ML(1)
SYRINGE (ML) INTRAVENOUS PRN
Status: DISCONTINUED | OUTPATIENT
Start: 2022-09-08 | End: 2022-09-08 | Stop reason: SURG

## 2022-09-08 RX ORDER — CEFOTETAN DISODIUM 2 G/20ML
INJECTION, POWDER, FOR SOLUTION INTRAMUSCULAR; INTRAVENOUS PRN
Status: DISCONTINUED | OUTPATIENT
Start: 2022-09-08 | End: 2022-09-08 | Stop reason: SURG

## 2022-09-08 RX ORDER — ONDANSETRON 2 MG/ML
4 INJECTION INTRAMUSCULAR; INTRAVENOUS
Status: COMPLETED | OUTPATIENT
Start: 2022-09-08 | End: 2022-09-08

## 2022-09-08 RX ORDER — MIDAZOLAM HYDROCHLORIDE 1 MG/ML
INJECTION INTRAMUSCULAR; INTRAVENOUS PRN
Status: DISCONTINUED | OUTPATIENT
Start: 2022-09-08 | End: 2022-09-08 | Stop reason: SURG

## 2022-09-08 RX ORDER — LABETALOL HYDROCHLORIDE 5 MG/ML
INJECTION, SOLUTION INTRAVENOUS PRN
Status: DISCONTINUED | OUTPATIENT
Start: 2022-09-08 | End: 2022-09-08 | Stop reason: SURG

## 2022-09-08 RX ORDER — DIPHENHYDRAMINE HYDROCHLORIDE 50 MG/ML
12.5 INJECTION INTRAMUSCULAR; INTRAVENOUS
Status: DISCONTINUED | OUTPATIENT
Start: 2022-09-08 | End: 2022-09-08 | Stop reason: HOSPADM

## 2022-09-08 RX ORDER — DEXAMETHASONE SODIUM PHOSPHATE 4 MG/ML
INJECTION, SOLUTION INTRA-ARTICULAR; INTRALESIONAL; INTRAMUSCULAR; INTRAVENOUS; SOFT TISSUE PRN
Status: DISCONTINUED | OUTPATIENT
Start: 2022-09-08 | End: 2022-09-08 | Stop reason: SURG

## 2022-09-08 RX ORDER — KETOROLAC TROMETHAMINE 30 MG/ML
INJECTION, SOLUTION INTRAMUSCULAR; INTRAVENOUS PRN
Status: DISCONTINUED | OUTPATIENT
Start: 2022-09-08 | End: 2022-09-08 | Stop reason: SURG

## 2022-09-08 RX ORDER — SODIUM CHLORIDE, SODIUM LACTATE, POTASSIUM CHLORIDE, CALCIUM CHLORIDE 600; 310; 30; 20 MG/100ML; MG/100ML; MG/100ML; MG/100ML
INJECTION, SOLUTION INTRAVENOUS CONTINUOUS
Status: DISCONTINUED | OUTPATIENT
Start: 2022-09-08 | End: 2022-09-08 | Stop reason: HOSPADM

## 2022-09-08 RX ORDER — ROCURONIUM BROMIDE 10 MG/ML
INJECTION, SOLUTION INTRAVENOUS PRN
Status: DISCONTINUED | OUTPATIENT
Start: 2022-09-08 | End: 2022-09-08 | Stop reason: SURG

## 2022-09-08 RX ORDER — ONDANSETRON 2 MG/ML
INJECTION INTRAMUSCULAR; INTRAVENOUS PRN
Status: DISCONTINUED | OUTPATIENT
Start: 2022-09-08 | End: 2022-09-08 | Stop reason: SURG

## 2022-09-08 RX ORDER — HYDROMORPHONE HYDROCHLORIDE 1 MG/ML
0.1 INJECTION, SOLUTION INTRAMUSCULAR; INTRAVENOUS; SUBCUTANEOUS
Status: DISCONTINUED | OUTPATIENT
Start: 2022-09-08 | End: 2022-09-08 | Stop reason: HOSPADM

## 2022-09-08 RX ORDER — DEXMEDETOMIDINE HYDROCHLORIDE 100 UG/ML
INJECTION, SOLUTION INTRAVENOUS PRN
Status: DISCONTINUED | OUTPATIENT
Start: 2022-09-08 | End: 2022-09-08 | Stop reason: SURG

## 2022-09-08 RX ORDER — HYDROMORPHONE HYDROCHLORIDE 1 MG/ML
0.4 INJECTION, SOLUTION INTRAMUSCULAR; INTRAVENOUS; SUBCUTANEOUS
Status: DISCONTINUED | OUTPATIENT
Start: 2022-09-08 | End: 2022-09-08 | Stop reason: HOSPADM

## 2022-09-08 RX ORDER — LIDOCAINE HYDROCHLORIDE 20 MG/ML
INJECTION, SOLUTION EPIDURAL; INFILTRATION; INTRACAUDAL; PERINEURAL PRN
Status: DISCONTINUED | OUTPATIENT
Start: 2022-09-08 | End: 2022-09-08 | Stop reason: SURG

## 2022-09-08 RX ORDER — HALOPERIDOL 5 MG/ML
1 INJECTION INTRAMUSCULAR
Status: DISCONTINUED | OUTPATIENT
Start: 2022-09-08 | End: 2022-09-08 | Stop reason: HOSPADM

## 2022-09-08 RX ORDER — OXYCODONE HCL 5 MG/5 ML
5 SOLUTION, ORAL ORAL
Status: DISCONTINUED | OUTPATIENT
Start: 2022-09-08 | End: 2022-09-08 | Stop reason: HOSPADM

## 2022-09-08 RX ORDER — HYDROMORPHONE HYDROCHLORIDE 1 MG/ML
0.2 INJECTION, SOLUTION INTRAMUSCULAR; INTRAVENOUS; SUBCUTANEOUS
Status: DISCONTINUED | OUTPATIENT
Start: 2022-09-08 | End: 2022-09-08 | Stop reason: HOSPADM

## 2022-09-08 RX ORDER — SUCCINYLCHOLINE CHLORIDE 20 MG/ML
INJECTION INTRAMUSCULAR; INTRAVENOUS PRN
Status: DISCONTINUED | OUTPATIENT
Start: 2022-09-08 | End: 2022-09-08 | Stop reason: SURG

## 2022-09-08 RX ORDER — OXYCODONE HCL 5 MG/5 ML
10 SOLUTION, ORAL ORAL
Status: DISCONTINUED | OUTPATIENT
Start: 2022-09-08 | End: 2022-09-08 | Stop reason: HOSPADM

## 2022-09-08 RX ORDER — SODIUM CHLORIDE, SODIUM LACTATE, POTASSIUM CHLORIDE, CALCIUM CHLORIDE 600; 310; 30; 20 MG/100ML; MG/100ML; MG/100ML; MG/100ML
INJECTION, SOLUTION INTRAVENOUS CONTINUOUS
Status: ACTIVE | OUTPATIENT
Start: 2022-09-08 | End: 2022-09-08

## 2022-09-08 RX ORDER — DIPHENHYDRAMINE HYDROCHLORIDE 50 MG/ML
25 INJECTION INTRAMUSCULAR; INTRAVENOUS ONCE
Status: COMPLETED | OUTPATIENT
Start: 2022-09-08 | End: 2022-09-08

## 2022-09-08 RX ORDER — HYDRALAZINE HYDROCHLORIDE 20 MG/ML
5 INJECTION INTRAMUSCULAR; INTRAVENOUS
Status: DISCONTINUED | OUTPATIENT
Start: 2022-09-08 | End: 2022-09-08 | Stop reason: HOSPADM

## 2022-09-08 RX ORDER — HYDROMORPHONE HYDROCHLORIDE 2 MG/ML
INJECTION, SOLUTION INTRAMUSCULAR; INTRAVENOUS; SUBCUTANEOUS PRN
Status: DISCONTINUED | OUTPATIENT
Start: 2022-09-08 | End: 2022-09-08 | Stop reason: SURG

## 2022-09-08 RX ORDER — LORAZEPAM 2 MG/ML
1 INJECTION INTRAMUSCULAR ONCE
Status: COMPLETED | OUTPATIENT
Start: 2022-09-08 | End: 2022-09-08

## 2022-09-08 RX ADMIN — DIPHENHYDRAMINE HYDROCHLORIDE 25 MG: 50 INJECTION INTRAMUSCULAR; INTRAVENOUS at 23:22

## 2022-09-08 RX ADMIN — LORAZEPAM 1 MG: 2 INJECTION INTRAMUSCULAR; INTRAVENOUS at 10:04

## 2022-09-08 RX ADMIN — ROCURONIUM BROMIDE 50 MG: 10 INJECTION, SOLUTION INTRAVENOUS at 13:25

## 2022-09-08 RX ADMIN — LABETALOL HYDROCHLORIDE 10 MG: 5 INJECTION, SOLUTION INTRAVENOUS at 14:05

## 2022-09-08 RX ADMIN — PROPOFOL 250 MG: 10 INJECTION, EMULSION INTRAVENOUS at 13:08

## 2022-09-08 RX ADMIN — FENTANYL CITRATE 50 MCG: 50 INJECTION, SOLUTION INTRAMUSCULAR; INTRAVENOUS at 15:46

## 2022-09-08 RX ADMIN — FENTANYL CITRATE 100 MCG: 50 INJECTION, SOLUTION INTRAMUSCULAR; INTRAVENOUS at 13:39

## 2022-09-08 RX ADMIN — ONDANSETRON 4 MG: 2 INJECTION INTRAMUSCULAR; INTRAVENOUS at 15:39

## 2022-09-08 RX ADMIN — ONDANSETRON 4 MG: 2 INJECTION INTRAMUSCULAR; INTRAVENOUS at 14:22

## 2022-09-08 RX ADMIN — SUCCINYLCHOLINE CHLORIDE 160 MG: 20 INJECTION, SOLUTION INTRAMUSCULAR; INTRAVENOUS; PARENTERAL at 13:08

## 2022-09-08 RX ADMIN — DEXAMETHASONE SODIUM PHOSPHATE 8 MG: 4 INJECTION, SOLUTION INTRA-ARTICULAR; INTRALESIONAL; INTRAMUSCULAR; INTRAVENOUS; SOFT TISSUE at 14:00

## 2022-09-08 RX ADMIN — HYDROMORPHONE HYDROCHLORIDE 0.5 MG: 1 INJECTION, SOLUTION INTRAMUSCULAR; INTRAVENOUS; SUBCUTANEOUS at 09:33

## 2022-09-08 RX ADMIN — CEFOTETAN DISODIUM 2 G: 2 INJECTION, POWDER, FOR SOLUTION INTRAMUSCULAR; INTRAVENOUS at 13:13

## 2022-09-08 RX ADMIN — FENTANYL CITRATE 50 MCG: 50 INJECTION, SOLUTION INTRAMUSCULAR; INTRAVENOUS at 13:05

## 2022-09-08 RX ADMIN — HYDROMORPHONE HYDROCHLORIDE 0.5 MG: 1 INJECTION, SOLUTION INTRAMUSCULAR; INTRAVENOUS; SUBCUTANEOUS at 19:58

## 2022-09-08 RX ADMIN — HYDROMORPHONE HYDROCHLORIDE 0.5 MG: 2 INJECTION INTRAMUSCULAR; INTRAVENOUS; SUBCUTANEOUS at 14:28

## 2022-09-08 RX ADMIN — Medication 50 MG: at 13:39

## 2022-09-08 RX ADMIN — KETOROLAC TROMETHAMINE 30 MG: 30 INJECTION, SOLUTION INTRAMUSCULAR at 14:28

## 2022-09-08 RX ADMIN — LIDOCAINE HYDROCHLORIDE 100 MG: 20 INJECTION, SOLUTION EPIDURAL; INFILTRATION; INTRACAUDAL at 13:08

## 2022-09-08 RX ADMIN — FENTANYL CITRATE 50 MCG: 50 INJECTION, SOLUTION INTRAMUSCULAR; INTRAVENOUS at 15:30

## 2022-09-08 RX ADMIN — HYDROMORPHONE HYDROCHLORIDE 0.5 MG: 1 INJECTION, SOLUTION INTRAMUSCULAR; INTRAVENOUS; SUBCUTANEOUS at 23:26

## 2022-09-08 RX ADMIN — HYDROMORPHONE HYDROCHLORIDE 0.5 MG: 2 INJECTION INTRAMUSCULAR; INTRAVENOUS; SUBCUTANEOUS at 14:46

## 2022-09-08 RX ADMIN — HYDROMORPHONE HYDROCHLORIDE 0.5 MG: 1 INJECTION, SOLUTION INTRAMUSCULAR; INTRAVENOUS; SUBCUTANEOUS at 03:54

## 2022-09-08 RX ADMIN — DEXMEDETOMIDINE 10 MCG: 200 INJECTION, SOLUTION INTRAVENOUS at 13:49

## 2022-09-08 RX ADMIN — HYDROMORPHONE HYDROCHLORIDE 0.5 MG: 1 INJECTION, SOLUTION INTRAMUSCULAR; INTRAVENOUS; SUBCUTANEOUS at 06:40

## 2022-09-08 RX ADMIN — HYDROMORPHONE HYDROCHLORIDE 0.5 MG: 1 INJECTION, SOLUTION INTRAMUSCULAR; INTRAVENOUS; SUBCUTANEOUS at 00:45

## 2022-09-08 RX ADMIN — FENTANYL CITRATE 50 MCG: 50 INJECTION, SOLUTION INTRAMUSCULAR; INTRAVENOUS at 13:25

## 2022-09-08 RX ADMIN — SODIUM CHLORIDE, POTASSIUM CHLORIDE, SODIUM LACTATE AND CALCIUM CHLORIDE: 600; 310; 30; 20 INJECTION, SOLUTION INTRAVENOUS at 13:00

## 2022-09-08 RX ADMIN — DEXMEDETOMIDINE 10 MCG: 200 INJECTION, SOLUTION INTRAVENOUS at 13:53

## 2022-09-08 RX ADMIN — MIDAZOLAM HYDROCHLORIDE 2 MG: 1 INJECTION, SOLUTION INTRAMUSCULAR; INTRAVENOUS at 13:05

## 2022-09-08 RX ADMIN — HEPARIN SODIUM 5000 UNITS: 5000 INJECTION, SOLUTION INTRAVENOUS; SUBCUTANEOUS at 23:22

## 2022-09-08 RX ADMIN — SUGAMMADEX 200 MG: 100 INJECTION, SOLUTION INTRAVENOUS at 14:29

## 2022-09-08 ASSESSMENT — PAIN DESCRIPTION - PAIN TYPE
TYPE: ACUTE PAIN
TYPE: ACUTE PAIN
TYPE: SURGICAL PAIN
TYPE: ACUTE PAIN
TYPE: SURGICAL PAIN
TYPE: ACUTE PAIN
TYPE: SURGICAL PAIN
TYPE: ACUTE PAIN

## 2022-09-08 ASSESSMENT — ENCOUNTER SYMPTOMS
INSOMNIA: 0
MUSCULOSKELETAL NEGATIVE: 1
POLYDIPSIA: 0
BACK PAIN: 0
EYE DISCHARGE: 0
HEADACHES: 0
SEIZURES: 0
CONSTITUTIONAL NEGATIVE: 1
CLAUDICATION: 0
NERVOUS/ANXIOUS: 1
ABDOMINAL PAIN: 0
DEPRESSION: 0
WEAKNESS: 0
PHOTOPHOBIA: 0
NAUSEA: 0
VOMITING: 0
FEVER: 0
COUGH: 0
ABDOMINAL PAIN: 1
DIZZINESS: 0
CARDIOVASCULAR NEGATIVE: 1
RESPIRATORY NEGATIVE: 1
CONSTIPATION: 0
CHILLS: 0
EYES NEGATIVE: 1
SORE THROAT: 0
EYE REDNESS: 0
SHORTNESS OF BREATH: 0
WEIGHT LOSS: 0
NEUROLOGICAL NEGATIVE: 1
DIAPHORESIS: 0

## 2022-09-08 ASSESSMENT — LIFESTYLE VARIABLES: SUBSTANCE_ABUSE: 0

## 2022-09-08 ASSESSMENT — VISUAL ACUITY: OU: 1

## 2022-09-08 ASSESSMENT — PAIN SCALES - GENERAL: PAIN_LEVEL: 2

## 2022-09-08 NOTE — ANESTHESIA PROCEDURE NOTES
Airway    Date/Time: 9/8/2022 1:09 PM  Performed by: Malachi Buchanan M.D.  Authorized by: Malachi Buchanan M.D.     Location:  OR  Urgency:  Elective  Indications for Airway Management:  Anesthesia      Spontaneous Ventilation: absent    Sedation Level:  Deep  Preoxygenated: Yes    Patient Position:  Sniffing  Mask Difficulty Assessment:  0 - not attempted  Final Airway Type:  Endotracheal airway  Final Endotracheal Airway:  ETT  Cuffed: Yes    Technique Used for Successful ETT Placement:  Direct laryngoscopy    Insertion Site:  Oral  Blade Type:  Ryanne  Laryngoscope Blade/Videolaryngoscope Blade Size:  3  ETT Size (mm):  7.0  Measured from:  Lips  Placement Verified by: auscultation and capnometry    Cormack-Lehane Classification:  Grade I - full view of glottis  Number of Attempts at Approach:  1  Number of Other Approaches Attempted:  0

## 2022-09-08 NOTE — PROGRESS NOTES
Surgery Progress Note               Author: Myranda Pepper M.D. Date & Time created: 9/8/2022  8:18 AM     Interval History:  Feeling better with NGT to suction. Ready for surgery today.     Review of Systems:  Review of Systems   Constitutional:  Negative for chills and fever.   Gastrointestinal:  Negative for abdominal pain, nausea and vomiting.     Physical Exam:  Physical Exam  Constitutional:       Appearance: Normal appearance. She is obese.   HENT:      Head: Normocephalic and atraumatic.      Right Ear: External ear normal.      Left Ear: External ear normal.      Mouth/Throat:      Mouth: Mucous membranes are moist.   Eyes:      Extraocular Movements: Extraocular movements intact.   Cardiovascular:      Rate and Rhythm: Normal rate and regular rhythm.   Pulmonary:      Effort: Pulmonary effort is normal.   Abdominal:      General: Abdomen is flat. There is no distension.      Comments: Large umbilical hernia with overlying skin compromise   Musculoskeletal:         General: Normal range of motion.   Skin:     General: Skin is warm and dry.      Capillary Refill: Capillary refill takes less than 2 seconds.   Neurological:      General: No focal deficit present.      Mental Status: She is alert.   Psychiatric:         Mood and Affect: Mood normal.         Behavior: Behavior normal.       Labs:          Recent Labs     09/05/22  0853 09/06/22  0116 09/07/22  0806   SODIUM 134* 136 141   POTASSIUM 3.3* 3.1* 3.2*   CHLORIDE 95* 100 101   CO2 21 24 25   BUN 12 10 10   CREATININE 0.52 0.42* 0.40*   MAGNESIUM 1.8  --   --    CALCIUM 9.5 8.6 8.9     Recent Labs     09/05/22  0853 09/06/22  0116 09/07/22  0806   ALTSGPT 33 41  --    ASTSGOT 30 29  --    ALKPHOSPHAT 70 70  --    TBILIRUBIN 0.7 0.6  --    LIPASE 11  --   --    GLUCOSE 229* 153* 197*     Recent Labs     09/05/22  0853 09/06/22  0116   RBC 6.09* 5.14   HEMOGLOBIN 15.5 13.1   HEMATOCRIT 47.6* 41.3   PLATELETCT 308 235     Recent Labs      22  0853 22  0116   WBC 13.0* 7.4   NEUTSPOLYS 83.50*  --    LYMPHOCYTES 9.70*  --    MONOCYTES 6.00  --    EOSINOPHILS 0.10  --    BASOPHILS 0.20  --    ASTSGOT 30 29   ALTSGPT 33 41   ALKPHOSPHAT 70 70   TBILIRUBIN 0.7 0.6     Hemodynamics:  Temp (24hrs), Av.7 °C (98.1 °F), Min:36.6 °C (97.9 °F), Max:36.9 °C (98.5 °F)  Temperature: 36.6 °C (97.9 °F)  Pulse  Av  Min: 72  Max: 106   Blood Pressure: 121/66     Respiratory:    Respiration: 18, Pulse Oximetry: 94 %           Fluids:    Intake/Output Summary (Last 24 hours) at 2022 0818  Last data filed at 2022 2230  Gross per 24 hour   Intake --   Output 3600 ml   Net -3600 ml       GI/Nutrition:  Orders Placed This Encounter   Procedures    Diet NPO Restrict to: Ice Chips     Standing Status:   Standing     Number of Occurrences:   1     Order Specific Question:   Diet NPO Restrict to:     Answer:   Ice Chips [2]     Medical Decision Making, by Problem:  Active Hospital Problems    Diagnosis     *Small bowel obstruction (HCC) [K56.609]     DM (diabetes mellitus) (HCC) [E11.9]     Primary hypertension [I10]     Morbid obesity with BMI of 50.0-59.9, adult (HCC) [E66.01, Z68.43]     Ventral hernia [K43.9]     SIRS (systemic inflammatory response syndrome) (HCC) [R65.10]     Hyponatremia [E87.1]     Hypokalemia [E87.6]        Plan:  To OR today for open hernia repair, possible bowel resection, possible wound vac placement. I discussed the surgical conduct with the patient including R/B/A. All questions answered. Patient agreed to proceed.     Quality-Core Measures

## 2022-09-08 NOTE — OR NURSING
1448 PT RECEIVED IN PACU, REPORT RECEIVED. VSS, RESP SPONT, EVEN, NON LABORED.    1455 NG TO LOW  SUCTION.  ABDOMINAL INCISION MIDLINE REGAN WITH SMALL SANG DRAINAGE TO ABDOMINAL BINDER.     1504 REPORT GIVEN TO RITA MANRIQUEZ.

## 2022-09-08 NOTE — PROGRESS NOTES
Patient back to floor after the procedure. Patient is awake and alert x 4, on 3 liters via Oxymask. RODRIGO drain in placed and midline incision noted, patient wears abdominal binder

## 2022-09-08 NOTE — ANESTHESIA TIME REPORT
Anesthesia Start and Stop Event Times     Date Time Event    9/8/2022 1255 Ready for Procedure     1300 Anesthesia Start     1450 Anesthesia Stop        Responsible Staff  09/08/22    Name Role Begin End    Malachi Buchanan M.D. Anesth 1300 1450        Overtime Reason:  no overtime (within assigned shift)    Comments:

## 2022-09-08 NOTE — ANESTHESIA POSTPROCEDURE EVALUATION
Patient: Jordyn Rick    Procedure Summary     Date: 09/08/22 Room / Location:  OR  / SURGERY [facility]    Anesthesia Start: 1300 Anesthesia Stop: 1450    Procedure: OPEN INCARCERATED VENTRAL HERNIA REPAIR, SMALL BOWEL RESECTION (Abdomen) Diagnosis: (incarcerated ventral hernia repair, small bowel obstruction)    Surgeons: Myranda Pepper M.D. Responsible Provider: Malachi Buchanan M.D.    Anesthesia Type: general ASA Status: 3          Final Anesthesia Type: general  Last vitals  BP   Blood Pressure: (!) 156/84    Temp   36.7 °C (98.1 °F)    Pulse   97   Resp   18    SpO2   94 %      Anesthesia Post Evaluation    Patient location during evaluation: PACU  Patient participation: complete - patient participated  Level of consciousness: sleepy but conscious  Pain score: 2    Airway patency: patent  Anesthetic complications: no  Cardiovascular status: hemodynamically stable  Respiratory status: acceptable  Hydration status: euvolemic    PONV: none          There were no known notable events for this encounter.     Nurse Pain Score: 3 (NPRS)

## 2022-09-08 NOTE — CARE PLAN
The patient is Watcher - Medium risk of patient condition declining or worsening    Shift Goals  Clinical Goals: pain level at or below 5\10  Patient Goals: hook up suction    Progress made toward(s) clinical / shift goals:  n/a    Patient is not progressing towards the following goals:  Pt to     Problem: Bowel Elimination  Goal: Establish and maintain regular bowel function  Outcome: Not Progressing     Problem: Gastrointestinal Irritability  Goal: Nausea and vomiting will be absent or improve  Outcome: Not Progressing

## 2022-09-08 NOTE — PROGRESS NOTES
Received patient from Night shift RN . Patient is awake and alert.No sign of distress. NGT to right nostril in placed, connected to intermittent low suction, draining greenish output. Abdomen is distended, for procedure this afternoon. Fall precaution in placed, kept bed in lowest position and call light within reach.

## 2022-09-08 NOTE — PROGRESS NOTES
[facility] Medicine Daily Progress Note    Date of Service  9/8/2022    Chief Complaint  Jordyn Rick is a 57 y.o. female admitted 9/5/2022 with nausea vomiting and abdominal pain    Hospital Course  9/6/2022-Jordyn is a 57-year-old female who has a ventral hernia and morbid obesity developed severe nausea vomiting at a camping trip.  The patient afterwards was not able to move her bowels.  Patient has not had a bowel movement since then.  Her camping trip was on Friday night.  She came to the hospital on Saturday.  The patient's obstruction has not resolved.  The patient does have a ventral hernia with intestinal obstruction.  NG tube is in place to intermittent suctioning.  Patient is given fluid resuscitation and electrolyte replacement.  She will need a small bowel follow-through to see if she is actually passing stool through all the way to the rectum.  9/7/2022-patient's small bowel follow-through shows an obstruction at the ventral hernia.  The patient at this point will need surgical intervention.  I discussed this with surgery and they will be planning on taking the patient to surgery tomorrow.  In the meantime continue n.p.o. status and NG tube suctioning.  9/8/2022-patient going for surgical solution today.  Patient has a small bowel obstruction at the hernia site.  Postoperatively patient will need pain management.  Patient will need fluid resuscitation.  Patient will need blood pressure management.    Interval Problem Update  Monitor pain and optimize pain management  N.p.o. for now for surgical procedure today.  Patient will need at this point postoperative optimization of nutritional status  Will need PT OT evaluation  NG tube to continue to suction until bowel movements have been optimized  Discharge planning.    I have discussed this patient's plan of care and discharge plan at IDT rounds today with Case Management, Nursing, Nursing leadership, and other members of the IDT  team.    Consultants/Specialty  general surgery    Code Status  Full Code    Disposition  Patient is not medically cleared for discharge.   Anticipate discharge to to home with close outpatient follow-up.  I have placed the appropriate orders for post-discharge needs.    Review of Systems  Review of Systems   Constitutional: Negative.  Negative for diaphoresis, fever and weight loss.   HENT: Negative.  Negative for congestion, sore throat and tinnitus.    Eyes: Negative.  Negative for photophobia, discharge and redness.   Respiratory: Negative.  Negative for cough and shortness of breath.    Cardiovascular: Negative.  Negative for chest pain, claudication and leg swelling.   Gastrointestinal:  Positive for abdominal pain (Pain down to 1 out of 10). Negative for constipation, melena, nausea and vomiting.   Genitourinary: Negative.  Negative for dysuria, frequency and hematuria.   Musculoskeletal: Negative.  Negative for back pain and joint pain.   Skin: Negative.  Negative for itching and rash.   Neurological: Negative.  Negative for dizziness, seizures, weakness and headaches.   Endo/Heme/Allergies: Negative.  Negative for environmental allergies and polydipsia.   Psychiatric/Behavioral:  Negative for depression, substance abuse and suicidal ideas. The patient is nervous/anxious. The patient does not have insomnia.    All other systems reviewed and are negative.     Physical Exam  Temp:  [36.6 °C (97.9 °F)-37.1 °C (98.8 °F)] 36.7 °C (98.1 °F)  Pulse:  [] 97  Resp:  [18] 18  BP: (121-156)/(66-88) 156/84  SpO2:  [93 %-96 %] 94 %    Physical Exam  Vitals and nursing note reviewed. Exam conducted with a chaperone present.   Constitutional:       General: She is awake.      Appearance: She is well-developed and well-groomed. She is obese. She is ill-appearing.   HENT:      Head: Normocephalic and atraumatic.      Jaw: There is normal jaw occlusion. No trismus.      Salivary Glands: Right salivary gland is not  tender. Left salivary gland is not tender.      Right Ear: External ear normal.      Left Ear: External ear normal.      Nose: Nose normal.      Mouth/Throat:      Mouth: Mucous membranes are moist.      Pharynx: Oropharynx is clear.   Eyes:      General: Lids are normal. Vision grossly intact.      Extraocular Movements: Extraocular movements intact.      Conjunctiva/sclera: Conjunctivae normal.      Right eye: Right conjunctiva is not injected. No exudate.     Left eye: Left conjunctiva is not injected. No exudate.     Pupils: Pupils are equal, round, and reactive to light.   Neck:      Thyroid: No thyroid mass.      Vascular: No hepatojugular reflux or JVD.      Trachea: No abnormal tracheal secretions or tracheal deviation.   Cardiovascular:      Rate and Rhythm: Normal rate and regular rhythm. Occasional Extrasystoles are present.     Pulses: Normal pulses.      Heart sounds: Normal heart sounds.   Pulmonary:      Effort: Pulmonary effort is normal.      Breath sounds: Normal breath sounds.   Abdominal:      General: Abdomen is flat. There is distension.      Palpations: Abdomen is soft.      Tenderness: There is abdominal tenderness. There is no right CVA tenderness or left CVA tenderness.      Hernia: No hernia is present.   Musculoskeletal:      Cervical back: Full passive range of motion without pain, normal range of motion and neck supple.      Right lower leg: No edema.      Left lower leg: No edema.   Lymphadenopathy:      Head:      Right side of head: No submental adenopathy.      Left side of head: No submental adenopathy.      Cervical:      Right cervical: No superficial cervical adenopathy.     Left cervical: No superficial cervical adenopathy.      Upper Body:      Right upper body: No supraclavicular adenopathy.      Left upper body: No supraclavicular adenopathy.   Skin:     General: Skin is warm and moist.      Capillary Refill: Capillary refill takes less than 2 seconds.      Coloration: Skin  is not cyanotic.      Findings: No abrasion.   Neurological:      General: No focal deficit present.      Mental Status: She is alert and oriented to person, place, and time. Mental status is at baseline.   Psychiatric:         Attention and Perception: Attention and perception normal.         Mood and Affect: Mood normal.         Speech: Speech normal.         Behavior: Behavior normal. Behavior is cooperative.         Thought Content: Thought content normal.         Judgment: Judgment normal.       Fluids    Intake/Output Summary (Last 24 hours) at 9/8/2022 1404  Last data filed at 9/7/2022 2230  Gross per 24 hour   Intake --   Output 2700 ml   Net -2700 ml         Laboratory  Recent Labs     09/06/22  0116   WBC 7.4   RBC 5.14   HEMOGLOBIN 13.1   HEMATOCRIT 41.3   MCV 80.4*   MCH 25.5*   MCHC 31.7*   RDW 52.0*   PLATELETCT 235   MPV 9.8       Recent Labs     09/06/22  0116 09/07/22  0806   SODIUM 136 141   POTASSIUM 3.1* 3.2*   CHLORIDE 100 101   CO2 24 25   GLUCOSE 153* 197*   BUN 10 10   CREATININE 0.42* 0.40*   CALCIUM 8.6 8.9                     Imaging  DX-ABDOMEN FOR TUBE PLACEMENT   Final Result      NG tube tip overlies the gastric fundus.      DX-SMALL BOWEL SERIES   Final Result      Dilated small intestine. Administered contrast does not reach the colon at 20 hours consistent with high-grade small bowel obstruction.      DX-ABDOMEN FOR TUBE PLACEMENT   Final Result      NG tube extends below the diaphragm with tip at the level of the gastric fundus.      CT-ABDOMEN-PELVIS WITH   Final Result      1.  Large midline lower abdominal ventral hernia containing colonic and small bowel loops, likely causing distal small bowel obstruction.   2.  No evidence of bowel perforation.   3.  Small volume ascites.   4.  Enlarged fatty liver.   5.  Stable nonspecific LEFT adrenal nodule.             Assessment/Plan  * Small bowel obstruction (HCC)- (present on admission)  Assessment & Plan  Patient going for surgical  repair today  Post surgery patient will need pain management  Follow surgical recommendations for resumption of diet    Ventral hernia- (present on admission)  Assessment & Plan  Surgical repair today    Hypokalemia- (present on admission)  Assessment & Plan  Repeat potassium levels  Patient was given 4K riders    Hyponatremia- (present on admission)  Assessment & Plan  Give fluid resuscitation monitor sodium levels  Most recent sodium up to 141 and hyponatremic this point has resolved    SIRS (systemic inflammatory response syndrome) ([facility])- (present on admission)  Assessment & Plan  Secondary to current bowel obstruction  Continue to monitor  Give fluid resuscitation and prevent from developing into sepsis    Primary hypertension- (present on admission)  Assessment & Plan  Blood pressure currently up at 156/84 due to anxiety  Anxiolytic management will be undertaken.    DM (diabetes mellitus) ([facility])- (present on admission)  Assessment & Plan  N.p.o. at this point in time  Monitor blood sugars and continue with sliding scale coverage    Morbid obesity with BMI of 50.0-59.9, adult ([facility])- (present on admission)  Assessment & Plan  Body mass index is 54.4 kg/m².  Outpatient weight loss management as well as lifestyle modification highly recommended.         VTE prophylaxis: SCDs/TEDs    I have performed a physical exam and reviewed and updated ROS and Plan today (9/8/2022). In review of yesterday's note (9/7/2022), there are no changes except as documented above.

## 2022-09-08 NOTE — PROGRESS NOTES
4 Eyes Skin Assessment Completed by Yaneth, MITRA and MITRA Garrido.    Head WDL  Ears WDL  Nose NGT to right nares  Mouth WDL  Neck WDL  Breast/Chest WDL  Shoulder Blades WDL  Spine WDL  (R) Arm/Elbow/Hand WDL  (L) Arm/Elbow/Hand WDL  Abdomen Incision and with RODRIGO drain  Groin WDL  Scrotum/Coccyx/Buttocks WDL  (R) Leg WDL  (L) Leg WDL  (R) Heel/Foot/Toe WDL  (L) Heel/Foot/Toe WDL          Devices In Places Blood Pressure Cuff and Pulse Ox      Interventions In Place N/A    Possible Skin Injury No    Pictures Uploaded Into Epic N/A  Wound Consult Placed N/A  RN Wound Prevention Protocol Ordered No

## 2022-09-08 NOTE — OP REPORT
OP Note    PreOp Diagnosis: Incarcerated ventral hernia with bowel obstruction, obesity with BMI of 54      PostOp Diagnosis: Incarcerated ventral hernia with bowel obstruction, obesity with BMI of 54      Procedure(s):  OPEN PRIMARY INCARCERATED VENTRAL HERNIA REPAIR, SMALL BOWEL RESECTION - Wound Class: Contaminated    Surgeon(s):  Myranda Pepper M.D.  Assistant Tito Anderson MS3  PJ [employer]iel student    Anesthesiologist/Type of Anesthesia:  Anesthesiologist: Malachi Buchanan M.D./General    Surgical Staff:  Circulator: Jeri Martinez R.N.  Scrub Person: Manish Hendricks    Specimens removed if any:  ID Type Source Tests Collected by Time Destination   A : hernia sac and umbilical skin  Other Other PATHOLOGY SPECIMEN Myranda Pepper M.D. 9/8/2022  1:42 PM    B : SMALL BOWEL Other Other PATHOLOGY SPECIMEN Myranda Pepper M.D. 9/8/2022  1:59 PM        Estimated Blood Loss: 200cc    Findings: Ventral hernia with neck measuring about 5 x 4 cm, ischemia without necrosis of about a foot of small intestine    Complications: None apparent      Indications: This is a 57-year-old woman who has a known incarcerated umbilical hernia with ulceration of the skin who presented with a bowel obstruction related to the hernia.  We discussed the difficulty of the operation to repair this hernia, in light of her truncal obesity and high BMI.  She has an extremely high risk of recurrence, near to 100%.  We discussed other risks including, but not limited to, bleeding, infection, need for further surgeries, need for bowel resection, almost certain recurrence and need to repair this again, difficulty with wound healing and need for prolonged hospital stay.  The patient understood and agreed to proceed.    Description of procedure:  The patient was brought to the operating room placed in comfortable supine position on the operating room table with all appropriate points padded.  General anesthesia was induced without  complication.  Timeout was held completed confirming correct patient, correct site, correct procedure and SCDs on and functioning.  She received antibiotics prior to incision.  Her abdomen was prepped with ChloraPrep and draped in the  Usual sterile fashion.  I made a large elliptical incision to include all of the umbilical skin that had compromised blood flow.  The inferior umbilical skin had large aphthous appearing ulcers, with question of some purulent drainage and severe malodor.  The elliptical incision ended up measuring about 25 x 20 cm.  I took the incision down to the hernia sac, and dissected the hernia sac away from the surrounding subcutaneous fat.  I took this all the way down to the fascia.  I then entered the hernia sac sharply superiorly, encountering a large amount of omentum which was scarred down to the hernia sac, a loop of transverse colon and a loop of small bowel which was ischemic but not necrotic.  I opened the hernia defect inferiorly by about a centimeter and this allowed good blood flow to the small intestine which pinked up nicely.  I dissected the entire hernia sac off of the underlying fascia carefully, being careful to protect the underlying structures.  This was passed off the table to pathology.  I then used a combination of cautery and interrupted 3-0 Vicryl suture to control bleeding in the omentum.  I went back and inspected the small bowel and found the areas where it had been incarcerated in the hernia sac appeared to be stricturing already, and there was an area on the proximal side that appeared not to have obtained good blood supply back.  I elected to resect this portion of bowel.  I made a defect in the mesentery on either side, bringing his back to normal bowel.  I sewed the small bowel together in a [id number] fashion to create a myix-tp-wyum functional end-to-end anastomosis.  I made enterotomies in both limbs, and passed a 75 mm Ethicon endostapler in and fired the  stapler, creating a common wall.  I then used a TA 60 stapler at a perpendicular angle to close the pouch.  I then used a LigaSure to divide the mesentery.  Again there was significant oozing from the area likely due to inflammation.  This was controlled with interrupted 3-0 Vicryl's.  The mesenteric defect was closed with a running 3-0 Vicryl and the staple line was reinforced with 3-0 Vicryl as well.  I then irrigated the area well and replaced the omentum, transverse colon and small bowel back into the abdomen without difficulty.  Due to the bowel ischemia I elected not to place a mesh.  I closed the fascia using interrupted #1 Prolene's.  There was some mild tension present.  I undermined the skin and subcutaneous fat layer on either side to allow for better mobility of the skin and subcutaneous fat to come back together.  Each flap was about 20 x 5 cm.  I placed a drain through the skin to sit above the fascia and secured this with a 2-0 nylon.  I irrigated the area well.  I then closed the skin using running 0 Vicryl in the deep subcutaneous fat, and another layer in the dermis.  The skin was then closed with staples.  This was dressed with a dry dressing.  The patient was then awoken from anesthesia in good condition having tolerated the procedure well.  All counts were correct at the end of the case x2.    9/8/2022 3:03 PM Myranda Pepper M.D.

## 2022-09-08 NOTE — PROGRESS NOTES
Received bedside patient report from MITRA Montanez. Patient resting comfortably in bed, no complaints at this time. Safety precautions in place. Will continue to monitor.

## 2022-09-08 NOTE — ANESTHESIA PREPROCEDURE EVALUATION
Case: [id number] Anesthesia Start Date/Time: 09/08/22 1300    Procedure: REPAIR, HERNIA, VENTRAL POSS MESH (Abdomen)    Anesthesia type: General    Pre-op diagnosis: UMBILICAL HERNIA    Location: [facility] OR 05 / SURGERY [facility]    Surgeons: Myranda Pepper M.D.        Small bowel obstruction at site of large umbilical hernia. NGT in place. No vomiting today since NGT has been to suction.     Relevant Problems   CARDIAC   (positive) Primary hypertension      Other   (positive) DM (diabetes mellitus) (HCC)   (positive) Hypokalemia   (positive) Morbid obesity with BMI of 50.0-59.9, adult (HCC)   (positive) SIRS (systemic inflammatory response syndrome) (HCC)   (positive) Small bowel obstruction (HCC)       Physical Exam    Airway   Mallampati: II  TM distance: >3 FB  Neck ROM: full       Cardiovascular - normal exam  Rhythm: regular  Rate: normal  (-) murmur     Dental - normal exam           Pulmonary - normal exam  Breath sounds clear to auscultation     Abdominal    Neurological - normal exam                 Anesthesia Plan    ASA 3   ASA physical status 3 criteria: morbid obesity - BMI greater than or equal to 40    Plan - general       Airway plan will be ETT          Induction: intravenous    Postoperative Plan: Postoperative administration of opioids is intended.    Pertinent diagnostic labs and testing reviewed    Informed Consent:    Anesthetic plan and risks discussed with patient.    Use of blood products discussed with: patient whom consented to blood products.

## 2022-09-08 NOTE — CARE PLAN
The patient is Stable - Low risk of patient condition declining or worsening    Shift Goals  Clinical Goals: Pain Control 3/10, No falls or injuries  Patient Goals: Pain Control 3/10, No falls or injuries    Progress made toward(s) clinical / shift goals:  Pain controlled 3/10, No falls or injuries    Patient is not progressing towards the following goals:    Problem: Bowel Elimination  Goal: Establish and maintain regular bowel function  Outcome: Not Progressing

## 2022-09-08 NOTE — OR NURSING
1504: Report from Sravani MANRIQUEZ. Pt sleeping, not able to keep eyes open, nods yes to pain, will wait until more awake for analgesia, nods no to nausea. NG tube patency verified, on low suction.  1515: Pt sleeping, not roused at this time.  1530: Pt more awake, states pain has increased, plan analgesia.  1540: Pt feeling nauseous, plan medication.  1545: Pt resting, still having significant pain, plan further analgesia.  1600: Pt resting states pain is tolerable at this time, denies nausea.  1615: No change.  1620: Pt states pain is tolerable, denies nausea. No change in surgical site assessment. Meets criteria to transfer to floor. Report called to Chris MANRIQUEZ.   1635: Transferred to the floor by 2 RNs, glasses on pt's face, belongings in bed, O2 tank @ 3/4 full on 3L oxymask.

## 2022-09-09 LAB
GLUCOSE BLD STRIP.AUTO-MCNC: 184 MG/DL (ref 65–99)
GLUCOSE BLD STRIP.AUTO-MCNC: 197 MG/DL (ref 65–99)
GLUCOSE BLD STRIP.AUTO-MCNC: 219 MG/DL (ref 65–99)

## 2022-09-09 PROCEDURE — 700102 HCHG RX REV CODE 250 W/ 637 OVERRIDE(OP): Performed by: HOSPITALIST

## 2022-09-09 PROCEDURE — 82962 GLUCOSE BLOOD TEST: CPT | Mod: 91

## 2022-09-09 PROCEDURE — A9270 NON-COVERED ITEM OR SERVICE: HCPCS | Performed by: HOSPITALIST

## 2022-09-09 PROCEDURE — 700111 HCHG RX REV CODE 636 W/ 250 OVERRIDE (IP): Performed by: HOSPITALIST

## 2022-09-09 PROCEDURE — A9270 NON-COVERED ITEM OR SERVICE: HCPCS | Performed by: INTERNAL MEDICINE

## 2022-09-09 PROCEDURE — 700105 HCHG RX REV CODE 258: Performed by: HOSPITALIST

## 2022-09-09 PROCEDURE — 700102 HCHG RX REV CODE 250 W/ 637 OVERRIDE(OP): Performed by: SURGERY

## 2022-09-09 PROCEDURE — 700111 HCHG RX REV CODE 636 W/ 250 OVERRIDE (IP): Performed by: INTERNAL MEDICINE

## 2022-09-09 PROCEDURE — 700102 HCHG RX REV CODE 250 W/ 637 OVERRIDE(OP): Performed by: INTERNAL MEDICINE

## 2022-09-09 PROCEDURE — 700101 HCHG RX REV CODE 250: Performed by: SURGERY

## 2022-09-09 PROCEDURE — 770006 HCHG ROOM/CARE - MED/SURG/GYN SEMI*

## 2022-09-09 PROCEDURE — 99233 SBSQ HOSP IP/OBS HIGH 50: CPT | Performed by: HOSPITALIST

## 2022-09-09 PROCEDURE — A9270 NON-COVERED ITEM OR SERVICE: HCPCS | Performed by: SURGERY

## 2022-09-09 PROCEDURE — 94760 N-INVAS EAR/PLS OXIMETRY 1: CPT

## 2022-09-09 RX ORDER — LORAZEPAM 0.5 MG/1
0.5 TABLET ORAL EVERY 4 HOURS PRN
Status: DISCONTINUED | OUTPATIENT
Start: 2022-09-09 | End: 2022-09-12 | Stop reason: HOSPADM

## 2022-09-09 RX ORDER — DEXTROSE MONOHYDRATE, SODIUM CHLORIDE, AND POTASSIUM CHLORIDE 50; 2.98; 9 G/1000ML; G/1000ML; G/1000ML
INJECTION, SOLUTION INTRAVENOUS CONTINUOUS
Status: DISCONTINUED | OUTPATIENT
Start: 2022-09-09 | End: 2022-09-11

## 2022-09-09 RX ORDER — SODIUM CHLORIDE 9 MG/ML
INJECTION, SOLUTION INTRAVENOUS CONTINUOUS
Status: DISCONTINUED | OUTPATIENT
Start: 2022-09-09 | End: 2022-09-09

## 2022-09-09 RX ADMIN — SODIUM CHLORIDE: 9 INJECTION, SOLUTION INTRAVENOUS at 10:50

## 2022-09-09 RX ADMIN — LORAZEPAM 0.5 MG: 0.5 TABLET ORAL at 10:48

## 2022-09-09 RX ADMIN — DEXTROSE MONOHYDRATE, SODIUM CHLORIDE, AND POTASSIUM CHLORIDE: 50; 9; 2.98 INJECTION, SOLUTION INTRAVENOUS at 15:34

## 2022-09-09 RX ADMIN — HYDROMORPHONE HYDROCHLORIDE 0.5 MG: 1 INJECTION, SOLUTION INTRAMUSCULAR; INTRAVENOUS; SUBCUTANEOUS at 09:45

## 2022-09-09 RX ADMIN — INSULIN HUMAN 2 UNITS: 100 INJECTION, SOLUTION PARENTERAL at 12:00

## 2022-09-09 RX ADMIN — HYDROMORPHONE HYDROCHLORIDE 0.5 MG: 1 INJECTION, SOLUTION INTRAMUSCULAR; INTRAVENOUS; SUBCUTANEOUS at 11:43

## 2022-09-09 RX ADMIN — HYDROMORPHONE HYDROCHLORIDE 0.5 MG: 1 INJECTION, SOLUTION INTRAMUSCULAR; INTRAVENOUS; SUBCUTANEOUS at 14:49

## 2022-09-09 RX ADMIN — Medication 25 MG: at 22:32

## 2022-09-09 RX ADMIN — HYDROMORPHONE HYDROCHLORIDE 0.5 MG: 1 INJECTION, SOLUTION INTRAMUSCULAR; INTRAVENOUS; SUBCUTANEOUS at 18:13

## 2022-09-09 RX ADMIN — OXYCODONE HYDROCHLORIDE 10 MG: 10 TABLET ORAL at 23:19

## 2022-09-09 RX ADMIN — HEPARIN SODIUM 5000 UNITS: 5000 INJECTION, SOLUTION INTRAVENOUS; SUBCUTANEOUS at 13:47

## 2022-09-09 RX ADMIN — HYDROMORPHONE HYDROCHLORIDE 0.5 MG: 1 INJECTION, SOLUTION INTRAMUSCULAR; INTRAVENOUS; SUBCUTANEOUS at 03:02

## 2022-09-09 RX ADMIN — HYDROMORPHONE HYDROCHLORIDE 0.5 MG: 1 INJECTION, SOLUTION INTRAMUSCULAR; INTRAVENOUS; SUBCUTANEOUS at 05:38

## 2022-09-09 RX ADMIN — HEPARIN SODIUM 5000 UNITS: 5000 INJECTION, SOLUTION INTRAVENOUS; SUBCUTANEOUS at 22:32

## 2022-09-09 RX ADMIN — INSULIN HUMAN 1 UNITS: 100 INJECTION, SOLUTION PARENTERAL at 17:00

## 2022-09-09 RX ADMIN — HEPARIN SODIUM 5000 UNITS: 5000 INJECTION, SOLUTION INTRAVENOUS; SUBCUTANEOUS at 05:38

## 2022-09-09 RX ADMIN — HYDROMORPHONE HYDROCHLORIDE 0.5 MG: 1 INJECTION, SOLUTION INTRAMUSCULAR; INTRAVENOUS; SUBCUTANEOUS at 20:48

## 2022-09-09 ASSESSMENT — ENCOUNTER SYMPTOMS
DEPRESSION: 0
PND: 0
ABDOMINAL PAIN: 1
FOCAL WEAKNESS: 0
EYES NEGATIVE: 1
EYE DISCHARGE: 0
CONSTITUTIONAL NEGATIVE: 1
SPEECH CHANGE: 0
POLYDIPSIA: 0
EYE PAIN: 0
NERVOUS/ANXIOUS: 1
WEIGHT LOSS: 0
NAUSEA: 0
SEIZURES: 0
WHEEZING: 0
MYALGIAS: 0
RESPIRATORY NEGATIVE: 1
COUGH: 0
NEUROLOGICAL NEGATIVE: 1
BACK PAIN: 0
CONSTIPATION: 0
FEVER: 0
MUSCULOSKELETAL NEGATIVE: 1
SORE THROAT: 0
SPUTUM PRODUCTION: 0
CARDIOVASCULAR NEGATIVE: 1

## 2022-09-09 ASSESSMENT — PAIN DESCRIPTION - PAIN TYPE
TYPE: SURGICAL PAIN
TYPE: SURGICAL PAIN
TYPE: ACUTE PAIN
TYPE: SURGICAL PAIN
TYPE: ACUTE PAIN
TYPE: SURGICAL PAIN
TYPE: SURGICAL PAIN
TYPE: ACUTE PAIN
TYPE: SURGICAL PAIN

## 2022-09-09 ASSESSMENT — COGNITIVE AND FUNCTIONAL STATUS - GENERAL
DAILY ACTIVITIY SCORE: 21
STANDING UP FROM CHAIR USING ARMS: A LITTLE
MOVING FROM LYING ON BACK TO SITTING ON SIDE OF FLAT BED: A LITTLE
SUGGESTED CMS G CODE MODIFIER MOBILITY: CK
WALKING IN HOSPITAL ROOM: A LITTLE
DRESSING REGULAR LOWER BODY CLOTHING: A LITTLE
CLIMB 3 TO 5 STEPS WITH RAILING: A LITTLE
TOILETING: A LITTLE
SUGGESTED CMS G CODE MODIFIER DAILY ACTIVITY: CJ
MOBILITY SCORE: 19
MOVING TO AND FROM BED TO CHAIR: A LITTLE
HELP NEEDED FOR BATHING: A LITTLE

## 2022-09-09 ASSESSMENT — LIFESTYLE VARIABLES: SUBSTANCE_ABUSE: 0

## 2022-09-09 NOTE — PROGRESS NOTES
[facility] Medicine Daily Progress Note    Date of Service  9/9/2022    Chief Complaint  Jordyn Rick is a 57 y.o. female admitted 9/5/2022 with nausea vomiting and abdominal pain    Hospital Course  9/6/2022-Jordyn is a 57-year-old female who has a ventral hernia and morbid obesity developed severe nausea vomiting at a camping trip.  The patient afterwards was not able to move her bowels.  Patient has not had a bowel movement since then.  Her camping trip was on Friday night.  She came to the hospital on Saturday.  The patient's obstruction has not resolved.  The patient does have a ventral hernia with intestinal obstruction.  NG tube is in place to intermittent suctioning.  Patient is given fluid resuscitation and electrolyte replacement.  She will need a small bowel follow-through to see if she is actually passing stool through all the way to the rectum.  9/7/2022-patient's small bowel follow-through shows an obstruction at the ventral hernia.  The patient at this point will need surgical intervention.  I discussed this with surgery and they will be planning on taking the patient to surgery tomorrow.  In the meantime continue n.p.o. status and NG tube suctioning.  9/8/2022-patient going for surgical solution today.  Patient has a small bowel obstruction at the hernia site.  Postoperatively patient will need pain management.  Patient will need fluid resuscitation.  Patient will need blood pressure management.  9/9/2022-postoperative day #1 after ventral hernia repair surgically.  The patient had incarcerated small bowel in the hernia sac itself.  About a foot of intestine had to be surgically removed.  Side to side anastomosis has been completed.  Patient so far has no bowel sounds.  Patient remains with an NG tube to low intermittent suctioning.  I explained to the patient necessity of the suctioning as well as fluid resuscitation.  Patient very anxious.  Anxiolytic management has been provided.  Patient also advised  of the necessity for Accu-Cheks with sliding scale coverage.    Interval Problem Update  Continue with NG tube suctioning  N.p.o. status with ice chips  Pain management  Wound care  Abdominal binder  I evaluated the wound myself and at this point there is no drainage or bleeding from the midline incision.  The midline incision at this point appears clean intact and appropriate for postoperative day #1.  Anxiety management  Accu-Cheks with sliding scale coverage continued  Discharge planning    I have discussed this patient's plan of care and discharge plan at IDT rounds today with Case Management, Nursing, Nursing leadership, and other members of the IDT team.    Consultants/Specialty  general surgery    Code Status  Full Code    Disposition  Patient is not medically cleared for discharge.   Anticipate discharge to to home with close outpatient follow-up.  I have placed the appropriate orders for post-discharge needs.    Review of Systems  Review of Systems   Constitutional: Negative.  Negative for fever, malaise/fatigue and weight loss.        Patient states she is not really hungry but she would like to have some water and some oral intake.   HENT: Negative.  Negative for ear pain, nosebleeds and sore throat.    Eyes: Negative.  Negative for pain and discharge.   Respiratory: Negative.  Negative for cough, sputum production and wheezing.    Cardiovascular: Negative.  Negative for chest pain, leg swelling and PND.   Gastrointestinal:  Positive for abdominal pain (Minimal abdominal pain). Negative for constipation and nausea.   Genitourinary: Negative.  Negative for dysuria.   Musculoskeletal: Negative.  Negative for back pain and myalgias.   Skin: Negative.  Negative for itching.   Neurological: Negative.  Negative for speech change, focal weakness and seizures.   Endo/Heme/Allergies: Negative.  Negative for polydipsia.   Psychiatric/Behavioral:  Negative for depression and substance abuse. The patient is  nervous/anxious.    All other systems reviewed and are negative.     Physical Exam  Temp:  [36.3 °C (97.3 °F)-36.7 °C (98 °F)] 36.7 °C (98 °F)  Pulse:  [] 124  Resp:  [14-16] 16  BP: (126-148)/(69-94) 142/69  SpO2:  [92 %-98 %] 94 %    Physical Exam  Vitals and nursing note reviewed. Exam conducted with a chaperone present.   Constitutional:       Appearance: She is obese.   HENT:      Head: Normocephalic and atraumatic.      Right Ear: External ear normal.      Left Ear: External ear normal.      Nose: Nose normal.      Mouth/Throat:      Mouth: Mucous membranes are dry.   Eyes:      Extraocular Movements: Extraocular movements intact.      Pupils: Pupils are equal, round, and reactive to light.   Neck:      Vascular: No carotid bruit.   Cardiovascular:      Rate and Rhythm: Normal rate.      Pulses: Normal pulses.      Heart sounds: Normal heart sounds.   Pulmonary:      Effort: Pulmonary effort is normal.      Breath sounds: Normal breath sounds. No wheezing or rhonchi.   Abdominal:      General: Bowel sounds are absent. There is distension.      Palpations: Abdomen is soft.      Tenderness: There is generalized abdominal tenderness.      Hernia: There is no hernia in the ventral area.      Comments: Ventral hernia at this point has been surgically removed and incarcerated hernia has been reduced.   Musculoskeletal:         General: No swelling. Normal range of motion.      Cervical back: Normal range of motion and neck supple. No rigidity.      Right lower leg: No edema.   Skin:     General: Skin is warm and dry.   Neurological:      General: No focal deficit present.      Mental Status: She is alert and oriented to person, place, and time.      Cranial Nerves: No cranial nerve deficit.      Sensory: No sensory deficit.      Gait: Gait normal.      Deep Tendon Reflexes: Reflexes normal.   Psychiatric:         Mood and Affect: Mood normal.         Judgment: Judgment normal.       Fluids    Intake/Output  Summary (Last 24 hours) at 9/9/2022 1507  Last data filed at 9/9/2022 0838  Gross per 24 hour   Intake 0 ml   Output 2695 ml   Net -2695 ml         Laboratory        Recent Labs     09/07/22  0806   SODIUM 141   POTASSIUM 3.2*   CHLORIDE 101   CO2 25   GLUCOSE 197*   BUN 10   CREATININE 0.40*   CALCIUM 8.9                     Imaging  DX-ABDOMEN FOR TUBE PLACEMENT   Final Result      NG tube tip overlies the gastric fundus.      DX-SMALL BOWEL SERIES   Final Result      Dilated small intestine. Administered contrast does not reach the colon at 20 hours consistent with high-grade small bowel obstruction.      DX-ABDOMEN FOR TUBE PLACEMENT   Final Result      NG tube extends below the diaphragm with tip at the level of the gastric fundus.      CT-ABDOMEN-PELVIS WITH   Final Result      1.  Large midline lower abdominal ventral hernia containing colonic and small bowel loops, likely causing distal small bowel obstruction.   2.  No evidence of bowel perforation.   3.  Small volume ascites.   4.  Enlarged fatty liver.   5.  Stable nonspecific LEFT adrenal nodule.             Assessment/Plan  * Small bowel obstruction (HCC)- (present on admission)  Assessment & Plan  Patient had an incarcerated ventral hernia.  Patient is postoperative day #1 after release of the incarcerated hernia  Continue with pain management  Continue with anxiety management  Nutrition still on hold and NG tube still in place  Abdominal sounds very poor    Ventral hernia- (present on admission)  Assessment & Plan  Status post surgical release of the ventral hernia incarceration.    Hypokalemia- (present on admission)  Assessment & Plan  Repeat levels in the morning    Hyponatremia- (present on admission)  Assessment & Plan  Resolved    SIRS (systemic inflammatory response syndrome) (HCC)- (present on admission)  Assessment & Plan  Resolving    Primary hypertension- (present on admission)  Assessment & Plan  Anxiety management as well as  antihypertensive management.    DM (diabetes mellitus) (HCC)- (present on admission)  Assessment & Plan  Discussed with patient in detail the necessity of Accu-Cheks with sliding scale coverage.  The patient agrees that at this point she will be compliant with management.    Morbid obesity with BMI of 50.0-59.9, adult (HCC)- (present on admission)  Assessment & Plan  Body mass index is 54.4 kg/m².  Patient will need outpatient weight loss management program as well as lifestyle modification program.         VTE prophylaxis: SCDs/TEDs    I have performed a physical exam and reviewed and updated ROS and Plan today (9/9/2022). In review of yesterday's note (9/8/2022), there are no changes except as documented above.

## 2022-09-09 NOTE — CARE PLAN
The patient is Stable - Low risk of patient condition declining or worsening    Shift Goals  Clinical Goals: patient will report mild pain only  Patient Goals: pain mngt    Progress made toward(s) clinical / shift goals:  Patient complains of pain to abdomen, received pain medication as ordered.Post surgery, patient declines for any pain medication.    Patient is not progressing towards the following goals:    Problem: Neurovascular Monitoring  Goal: Patient's neurovascular status will be maintained or improve  Outcome: Progressing     Problem: Risk for Post Op Fluid Imbalance  Goal: Promotion of fluid balance  Outcome: Progressing     Problem: Respiratory/Oxygenation Function Post-Surgical  Goal: Patient will achieve/maintain normal respiratory rate/effort  Outcome: Progressing     Problem: Early Mobilization - Post Surgery  Goal: Early mobilization post surgery  Outcome: Progressing     Problem: Pain - Post Surgery  Goal: Alleviation or reduction of pain post surgery  Outcome: Progressing     Problem: Wound/ / Incision Healing  Goal: Patient's wound/surgical incision will decrease in size and heals properly  Outcome: Progressing     Problem: Surgical Drain Management  Goal: Proper management/care of surgical drains will be maintained  Outcome: Progressing     Problem: Bowel Elimination - Post Surgical  Goal: Patient will resume regular bowel sounds and function with no discomfort or distention  Outcome: Progressing     Problem: Respiratory  Goal: Patient will achieve/maintain optimum respiratory ventilation and gas exchange  Outcome: Progressing

## 2022-09-09 NOTE — CARE PLAN
The patient is Stable - Low risk of patient condition declining or worsening    Shift Goals  Clinical Goals: pain control, VSS  Patient Goals: pain management, rest  Family Goals: N/A    Progress made toward(s) clinical / shift goals:    Problem: Pain - Standard  Goal: Alleviation of pain or a reduction in pain to the patient’s comfort goal  Outcome: Progressing     Problem: Knowledge Deficit - Standard  Goal: Patient and family/care givers will demonstrate understanding of plan of care, disease process/condition, diagnostic tests and medications  Outcome: Progressing     Problem: Bowel Elimination  Goal: Establish and maintain regular bowel function  Outcome: Progressing     Problem: Gastrointestinal Irritability  Goal: Nausea and vomiting will be absent or improve  Outcome: Progressing     Problem: Neurovascular Monitoring  Goal: Patient's neurovascular status will be maintained or improve  Outcome: Progressing     Problem: Risk for Post Op Fluid Imbalance  Goal: Promotion of fluid balance  Outcome: Progressing     Problem: Respiratory/Oxygenation Function Post-Surgical  Goal: Patient will achieve/maintain normal respiratory rate/effort  Outcome: Progressing     Problem: Early Mobilization - Post Surgery  Goal: Early mobilization post surgery  Outcome: Progressing     Problem: Pain - Post Surgery  Goal: Alleviation or reduction of pain post surgery  Outcome: Progressing     Problem: Wound/ / Incision Healing  Goal: Patient's wound/surgical incision will decrease in size and heals properly  Outcome: Progressing     Problem: Surgical Drain Management  Goal: Proper management/care of surgical drains will be maintained  Outcome: Progressing     Problem: Bowel Elimination - Post Surgical  Goal: Patient will resume regular bowel sounds and function with no discomfort or distention  Outcome: Progressing       Patient is not progressing towards the following goals:

## 2022-09-09 NOTE — PROGRESS NOTES
Received patient from Night shift RN . Patient is awake and alert.No sign of distress. Patient denies any n/v. RODRIGO drain in placed to Right side of the abdomen, draining serosanguinous output, Still with NGT to Right nares, connected to low intermittent suction. Midline incision to abdomen, with abdominal pad and binder in placed. Fall precaution observed, kept bed in lowest position and call light within reach.

## 2022-09-09 NOTE — DISCHARGE PLANNING
Case Management Discharge Planning    Admission Date: 9/5/2022  GMLOS: 5.7  ALOS: 4    6-Clicks ADL Score: 24  6-Clicks Mobility Score: 24      Anticipated Discharge Dispo: Discharge Disposition: Discharged to home/self care (01)    DME Needed: No    Action(s) Taken: Updated Provider/Nurse on Discharge Plan    No anticipated DC needs. RN CM will continue to follow.     Escalations Completed: None    Medically Clear: No    Next Steps: Medical clearance      Barriers to Discharge: Medical clearance

## 2022-09-09 NOTE — PROGRESS NOTES
"9/9  S:  57 y.o.female s/p Incarcerated Ventral Hernia Repair with SBR POD# 1.  Patient doing well overnight, sitting up in bed, pain currently well controlled, and she denies any nausea or emesis.  She has been passing some flatus and stool as well since surgery.      O:  BP (!) 142/69   Pulse (!) 124   Temp 36.7 °C (98 °F) (Oral)   Resp 16   Ht 1.549 m (5' 1\")   Wt (!) 131 kg (287 lb 14.7 oz)   SpO2 94%   I/O last 3 completed shifts:  In: 1700 [I.V.:1700]  Out: 3585 [Drains:10]  Recent Labs     09/07/22  0806   SODIUM 141   POTASSIUM 3.2*   CHLORIDE 101   CO2 25   GLUCOSE 197*   BUN 10   CREATININE 0.40*   CALCIUM 8.9           Alert and Oriented x3, No Acute Distress  Normal Respiratory Effort  Abdomen soft, appropriately tender  Incisions/Bandages clean/dry/intact  Extremities warm and well perfused  RODRIGO Output Serosanguinous- 10cc  NGT- 1,675cc output overnight    A/P:  Pain control with PO meds, IV prn, minimize narcotics as above  Diet NPO, NGt to suction.  Still with high output, try clamping trail tomorrow  Fluids continue maintence level until tolerating PO  Ambulate tid and ad kiersten  Agree with addition of insulin SS      "

## 2022-09-10 LAB
ANION GAP SERPL CALC-SCNC: 13 MMOL/L (ref 7–16)
BUN SERPL-MCNC: 10 MG/DL (ref 8–22)
CALCIUM SERPL-MCNC: 8.3 MG/DL (ref 8.4–10.2)
CHLORIDE SERPL-SCNC: 101 MMOL/L (ref 96–112)
CO2 SERPL-SCNC: 30 MMOL/L (ref 20–33)
CREAT SERPL-MCNC: 0.46 MG/DL (ref 0.5–1.4)
ERYTHROCYTE [DISTWIDTH] IN BLOOD BY AUTOMATED COUNT: 55 FL (ref 35.9–50)
GFR SERPLBLD CREATININE-BSD FMLA CKD-EPI: 112 ML/MIN/1.73 M 2
GLUCOSE BLD STRIP.AUTO-MCNC: 199 MG/DL (ref 65–99)
GLUCOSE BLD STRIP.AUTO-MCNC: 200 MG/DL (ref 65–99)
GLUCOSE BLD STRIP.AUTO-MCNC: 211 MG/DL (ref 65–99)
GLUCOSE BLD STRIP.AUTO-MCNC: 213 MG/DL (ref 65–99)
GLUCOSE SERPL-MCNC: 213 MG/DL (ref 65–99)
HCT VFR BLD AUTO: 36 % (ref 37–47)
HGB BLD-MCNC: 11.2 G/DL (ref 12–16)
MCH RBC QN AUTO: 25.7 PG (ref 27–33)
MCHC RBC AUTO-ENTMCNC: 31.1 G/DL (ref 33.6–35)
MCV RBC AUTO: 82.6 FL (ref 81.4–97.8)
PLATELET # BLD AUTO: 242 K/UL (ref 164–446)
PMV BLD AUTO: 10.3 FL (ref 9–12.9)
POTASSIUM SERPL-SCNC: 3.1 MMOL/L (ref 3.6–5.5)
RBC # BLD AUTO: 4.36 M/UL (ref 4.2–5.4)
SODIUM SERPL-SCNC: 144 MMOL/L (ref 135–145)
WBC # BLD AUTO: 8.2 K/UL (ref 4.8–10.8)

## 2022-09-10 PROCEDURE — 80048 BASIC METABOLIC PNL TOTAL CA: CPT

## 2022-09-10 PROCEDURE — 770006 HCHG ROOM/CARE - MED/SURG/GYN SEMI*

## 2022-09-10 PROCEDURE — A9270 NON-COVERED ITEM OR SERVICE: HCPCS | Performed by: INTERNAL MEDICINE

## 2022-09-10 PROCEDURE — 85027 COMPLETE CBC AUTOMATED: CPT

## 2022-09-10 PROCEDURE — 700102 HCHG RX REV CODE 250 W/ 637 OVERRIDE(OP): Performed by: INTERNAL MEDICINE

## 2022-09-10 PROCEDURE — 99232 SBSQ HOSP IP/OBS MODERATE 35: CPT | Performed by: HOSPITALIST

## 2022-09-10 PROCEDURE — 82962 GLUCOSE BLOOD TEST: CPT | Mod: 91

## 2022-09-10 PROCEDURE — 700111 HCHG RX REV CODE 636 W/ 250 OVERRIDE (IP): Performed by: INTERNAL MEDICINE

## 2022-09-10 PROCEDURE — 700101 HCHG RX REV CODE 250: Performed by: SURGERY

## 2022-09-10 PROCEDURE — 36415 COLL VENOUS BLD VENIPUNCTURE: CPT

## 2022-09-10 PROCEDURE — 700102 HCHG RX REV CODE 250 W/ 637 OVERRIDE(OP): Performed by: SURGERY

## 2022-09-10 PROCEDURE — 94760 N-INVAS EAR/PLS OXIMETRY 1: CPT

## 2022-09-10 PROCEDURE — A9270 NON-COVERED ITEM OR SERVICE: HCPCS | Performed by: SURGERY

## 2022-09-10 RX ADMIN — OXYCODONE HYDROCHLORIDE 10 MG: 10 TABLET ORAL at 07:49

## 2022-09-10 RX ADMIN — DEXTROSE MONOHYDRATE, SODIUM CHLORIDE, AND POTASSIUM CHLORIDE 1000 ML: 50; 9; 2.98 INJECTION, SOLUTION INTRAVENOUS at 03:20

## 2022-09-10 RX ADMIN — HEPARIN SODIUM 5000 UNITS: 5000 INJECTION, SOLUTION INTRAVENOUS; SUBCUTANEOUS at 05:48

## 2022-09-10 RX ADMIN — OXYCODONE HYDROCHLORIDE 5 MG: 5 TABLET ORAL at 03:19

## 2022-09-10 RX ADMIN — INSULIN HUMAN 1 UNITS: 100 INJECTION, SOLUTION PARENTERAL at 00:22

## 2022-09-10 RX ADMIN — HEPARIN SODIUM 5000 UNITS: 5000 INJECTION, SOLUTION INTRAVENOUS; SUBCUTANEOUS at 13:16

## 2022-09-10 RX ADMIN — INSULIN HUMAN 2 UNITS: 100 INJECTION, SOLUTION PARENTERAL at 11:35

## 2022-09-10 RX ADMIN — OXYCODONE HYDROCHLORIDE 10 MG: 10 TABLET ORAL at 10:01

## 2022-09-10 RX ADMIN — DEXTROSE MONOHYDRATE, SODIUM CHLORIDE, AND POTASSIUM CHLORIDE: 50; 9; 2.98 INJECTION, SOLUTION INTRAVENOUS at 18:32

## 2022-09-10 RX ADMIN — Medication 25 MG: at 22:36

## 2022-09-10 RX ADMIN — OXYCODONE HYDROCHLORIDE 10 MG: 10 TABLET ORAL at 21:37

## 2022-09-10 RX ADMIN — INSULIN HUMAN 1 UNITS: 100 INJECTION, SOLUTION PARENTERAL at 17:30

## 2022-09-10 RX ADMIN — INSULIN HUMAN 2 UNITS: 100 INJECTION, SOLUTION PARENTERAL at 05:48

## 2022-09-10 RX ADMIN — HEPARIN SODIUM 5000 UNITS: 5000 INJECTION, SOLUTION INTRAVENOUS; SUBCUTANEOUS at 21:37

## 2022-09-10 RX ADMIN — OXYCODONE HYDROCHLORIDE 10 MG: 10 TABLET ORAL at 15:24

## 2022-09-10 RX ADMIN — OXYCODONE HYDROCHLORIDE 10 MG: 10 TABLET ORAL at 12:20

## 2022-09-10 RX ADMIN — OXYCODONE HYDROCHLORIDE 10 MG: 10 TABLET ORAL at 18:32

## 2022-09-10 ASSESSMENT — ENCOUNTER SYMPTOMS
SORE THROAT: 0
SINUS PAIN: 0
EYE PAIN: 0
CONSTITUTIONAL NEGATIVE: 1
MYALGIAS: 0
EYE DISCHARGE: 0
TINGLING: 0
HEARTBURN: 0
RESPIRATORY NEGATIVE: 1
SPUTUM PRODUCTION: 0
FEVER: 0
BACK PAIN: 0
ORTHOPNEA: 0
CARDIOVASCULAR NEGATIVE: 1
MUSCULOSKELETAL NEGATIVE: 1
ABDOMINAL PAIN: 1
DIAPHORESIS: 0
POLYDIPSIA: 0
WHEEZING: 0
PND: 0
SHORTNESS OF BREATH: 0
DEPRESSION: 0
SEIZURES: 0
SPEECH CHANGE: 0
FOCAL WEAKNESS: 0
COUGH: 0
FALLS: 0
DOUBLE VISION: 0
EYES NEGATIVE: 1
TREMORS: 0
NAUSEA: 0
MEMORY LOSS: 0
CONSTIPATION: 0
NERVOUS/ANXIOUS: 1
NEUROLOGICAL NEGATIVE: 1

## 2022-09-10 ASSESSMENT — PAIN DESCRIPTION - PAIN TYPE
TYPE: SURGICAL PAIN
TYPE: ACUTE PAIN
TYPE: SURGICAL PAIN
TYPE: ACUTE PAIN
TYPE: SURGICAL PAIN
TYPE: ACUTE PAIN
TYPE: SURGICAL PAIN
TYPE: ACUTE PAIN
TYPE: ACUTE PAIN

## 2022-09-10 ASSESSMENT — LIFESTYLE VARIABLES: SUBSTANCE_ABUSE: 0

## 2022-09-10 NOTE — PROGRESS NOTES
"S:  57 y.o.female s/p Open VHR with SBR  POD# 2.  Patient doing well overnight, sitting up in bed, pain currently well controlled, she denies any nausea or emesis.  Passing flatus and stool since surgery.  NGT remains in place with decreased output overnight.      O:  BP (!) 141/64   Pulse (!) 111   Temp 36.7 °C (98 °F) (Temporal)   Resp 18   Ht 1.549 m (5' 1\")   Wt (!) 131 kg (287 lb 14.7 oz)   SpO2 91%   I/O last 3 completed shifts:  In: 1520 [P.O.:620; I.V.:900]  Out: 4235 [Urine:400; Drains:35]  Recent Labs     09/10/22  0342   SODIUM 144   POTASSIUM 3.1*   CHLORIDE 101   CO2 30   GLUCOSE 213*   BUN 10   CREATININE 0.46*   CALCIUM 8.3*     Recent Labs     09/10/22  0342   WBC 8.2   RBC 4.36   HEMOGLOBIN 11.2*   HEMATOCRIT 36.0*   MCV 82.6   MCH 25.7*   MCHC 31.1*   RDW 55.0*   PLATELETCT 242   MPV 10.3       Alert and Oriented x3, No Acute Distress  Normal Respiratory Effort  Abdomen soft, appropriately tender  Incisions/Bandages clean/dry/intact  Extremities warm and well perfused  RODRIGO Output Serosanguinous- 35cc  NGT- 2,600cc yesterday, 200cc only overnight    A/P:  Pain control with PO meds, minimize narcotics as able  Clamp NGT and remove in 6 hours if low residuals.  Ok to start clear liquid diet after removed  Fluids continue maintence  Ambulate tid and ad kiersten      "

## 2022-09-10 NOTE — PROGRESS NOTES
10:00 NGT clamped as ordered  16:00 NGT residual checked, obtained 80 ml light greenish color            NGT removed as ordered.            Offered patient a clear liquid diet

## 2022-09-10 NOTE — PROGRESS NOTES
[facility] Medicine Daily Progress Note    Date of Service  9/10/2022    Chief Complaint  Jordyn Rick is a 57 y.o. female admitted 9/5/2022 with nausea vomiting and abdominal pain    Hospital Course  9/6/2022-Jordyn is a 57-year-old female who has a ventral hernia and morbid obesity developed severe nausea vomiting at a camping trip.  The patient afterwards was not able to move her bowels.  Patient has not had a bowel movement since then.  Her camping trip was on Friday night.  She came to the hospital on Saturday.  The patient's obstruction has not resolved.  The patient does have a ventral hernia with intestinal obstruction.  NG tube is in place to intermittent suctioning.  Patient is given fluid resuscitation and electrolyte replacement.  She will need a small bowel follow-through to see if she is actually passing stool through all the way to the rectum.  9/7/2022-patient's small bowel follow-through shows an obstruction at the ventral hernia.  The patient at this point will need surgical intervention.  I discussed this with surgery and they will be planning on taking the patient to surgery tomorrow.  In the meantime continue n.p.o. status and NG tube suctioning.  9/8/2022-patient going for surgical solution today.  Patient has a small bowel obstruction at the hernia site.  Postoperatively patient will need pain management.  Patient will need fluid resuscitation.  Patient will need blood pressure management.  9/9/2022-postoperative day #1 after ventral hernia repair surgically.  The patient had incarcerated small bowel in the hernia sac itself.  About a foot of intestine had to be surgically removed.  Side to side anastomosis has been completed.  Patient so far has no bowel sounds.  Patient remains with an NG tube to low intermittent suctioning.  I explained to the patient necessity of the suctioning as well as fluid resuscitation.  Patient very anxious.  Anxiolytic management has been provided.  Patient also  advised of the necessity for Accu-Cheks with sliding scale coverage.  9/10/2022-postoperative day #2.  Patient doing much better today.  Start clear liquids per surgery. Clamping NG, Continue with fluid maintenance.  Pain management at this point stable  Continue PT OT  Anticipate advancing diet in the next 24 to 48 hours and then patient should be able to go home pretty soon.    Interval Problem Update  Clamp NG  Possibly remove NG in 6 hours  Advance diet to clear liquids  Pain management  PT OT  Discharge planning    I have discussed this patient's plan of care and discharge plan at IDT rounds today with Case Management, Nursing, Nursing leadership, and other members of the IDT team.    Consultants/Specialty  general surgery    Code Status  Full Code    Disposition  Patient is not medically cleared for discharge.   Anticipate discharge to to home with close outpatient follow-up.  I have placed the appropriate orders for post-discharge needs.    Review of Systems  Review of Systems   Constitutional: Negative.  Negative for diaphoresis and fever.        Patient states she is not really hungry but she would like to have some water and some oral intake.   HENT: Negative.  Negative for ear discharge, hearing loss, sinus pain and sore throat.    Eyes: Negative.  Negative for double vision, pain and discharge.   Respiratory: Negative.  Negative for cough, sputum production, shortness of breath and wheezing.    Cardiovascular: Negative.  Negative for chest pain, orthopnea, leg swelling and PND.   Gastrointestinal:  Positive for abdominal pain (Minimal abdominal pain). Negative for constipation, heartburn and nausea.   Genitourinary: Negative.  Negative for dysuria, frequency and hematuria.   Musculoskeletal: Negative.  Negative for back pain, falls and myalgias.   Skin: Negative.  Negative for itching and rash.   Neurological: Negative.  Negative for tingling, tremors, speech change, focal weakness and seizures.    Endo/Heme/Allergies: Negative.  Negative for polydipsia.   Psychiatric/Behavioral:  Negative for depression, memory loss and substance abuse. The patient is nervous/anxious.    All other systems reviewed and are negative.     Physical Exam  Temp:  [36.5 °C (97.7 °F)-36.8 °C (98.3 °F)] 36.5 °C (97.7 °F)  Pulse:  [] 99  Resp:  [18-20] 20  BP: (121-141)/(59-78) 129/59  SpO2:  [90 %-94 %] 90 %    Physical Exam  Vitals and nursing note reviewed. Exam conducted with a chaperone present.   Constitutional:       Appearance: Normal appearance. She is obese. She is not ill-appearing.   HENT:      Head: Normocephalic and atraumatic.      Right Ear: External ear normal.      Left Ear: External ear normal.      Nose: Nose normal.      Mouth/Throat:      Mouth: Mucous membranes are dry.   Eyes:      Extraocular Movements: Extraocular movements intact.      Pupils: Pupils are equal, round, and reactive to light.   Neck:      Vascular: No carotid bruit.   Cardiovascular:      Rate and Rhythm: Normal rate and regular rhythm.      Pulses: Normal pulses.      Heart sounds: Normal heart sounds.   Pulmonary:      Effort: Pulmonary effort is normal.      Breath sounds: Normal breath sounds. No wheezing or rhonchi.   Abdominal:      General: Bowel sounds are absent. There is distension.      Palpations: Abdomen is soft.      Tenderness: There is generalized abdominal tenderness.      Hernia: There is no hernia in the ventral area.          Comments: Ventral hernia at this point has been surgically removed and incarcerated hernia has been reduced.   Musculoskeletal:         General: No swelling. Normal range of motion.      Cervical back: Normal range of motion and neck supple. No rigidity.      Right lower leg: No edema.   Skin:     General: Skin is warm and dry.   Neurological:      General: No focal deficit present.      Mental Status: She is alert and oriented to person, place, and time.      Cranial Nerves: No cranial nerve  deficit.      Sensory: No sensory deficit.      Gait: Gait normal.      Deep Tendon Reflexes: Reflexes normal.   Psychiatric:         Mood and Affect: Mood normal.         Judgment: Judgment normal.       Fluids    Intake/Output Summary (Last 24 hours) at 9/10/2022 1254  Last data filed at 9/10/2022 0811  Gross per 24 hour   Intake 2020 ml   Output 2885 ml   Net -865 ml         Laboratory  Recent Labs     09/10/22  0342   WBC 8.2   RBC 4.36   HEMOGLOBIN 11.2*   HEMATOCRIT 36.0*   MCV 82.6   MCH 25.7*   MCHC 31.1*   RDW 55.0*   PLATELETCT 242   MPV 10.3       Recent Labs     09/10/22  0342   SODIUM 144   POTASSIUM 3.1*   CHLORIDE 101   CO2 30   GLUCOSE 213*   BUN 10   CREATININE 0.46*   CALCIUM 8.3*                     Imaging  DX-ABDOMEN FOR TUBE PLACEMENT   Final Result      NG tube tip overlies the gastric fundus.      DX-SMALL BOWEL SERIES   Final Result      Dilated small intestine. Administered contrast does not reach the colon at 20 hours consistent with high-grade small bowel obstruction.      DX-ABDOMEN FOR TUBE PLACEMENT   Final Result      NG tube extends below the diaphragm with tip at the level of the gastric fundus.      CT-ABDOMEN-PELVIS WITH   Final Result      1.  Large midline lower abdominal ventral hernia containing colonic and small bowel loops, likely causing distal small bowel obstruction.   2.  No evidence of bowel perforation.   3.  Small volume ascites.   4.  Enlarged fatty liver.   5.  Stable nonspecific LEFT adrenal nodule.             Assessment/Plan  * Small bowel obstruction (HCC)- (present on admission)  Assessment & Plan  Postoperative day #2  Bowel sounds at this point are returning  Patient has had a small bowel movement as well as passing gas  NG tube remains to suction  Surgery to see possibly discontinue NG tube and start diet.  Once diet is started and patient is tolerating it we will then start making discharge preparations    Ventral hernia- (present on admission)  Assessment  & Plan  Postoperative day #2 after surgical repair    Hypokalemia- (present on admission)  Assessment & Plan  Potassium was 3.1    Hyponatremia- (present on admission)  Assessment & Plan  Resolved    SIRS (systemic inflammatory response syndrome) (HCC)- (present on admission)  Assessment & Plan  Resolving    Primary hypertension- (present on admission)  Assessment & Plan  Continue with blood pressure management optimization    DM (diabetes mellitus) ([facility])- (present on admission)  Assessment & Plan  -accus with sliding scale coverage  -diabetic diet  -diabetic education  -follow glycohemoglobin levels long term  -continue with oral antihyperglycemics  -monitor for hypoglycemic episodes and adjust control if he should get low    Morbid obesity with BMI of 50.0-59.9, adult ([facility])- (present on admission)  Assessment & Plan  Body mass index is 54.4 kg/m².  Patient will need outpatient weight loss management program as well as lifestyle modification program.         VTE prophylaxis: SCDs/TEDs    I have performed a physical exam and reviewed and updated ROS and Plan today (9/10/2022). In review of yesterday's note (9/9/2022), there are no changes except as documented above.

## 2022-09-10 NOTE — CARE PLAN
The patient is Stable - Low risk of patient condition declining or worsening    Shift Goals  Clinical Goals: patient will be able to sleep at least 5 hours  Patient Goals: pain mngt and sleep comfortably  Family Goals: N/A    Progress made toward(s) clinical / shift goals:  Patient did not get enough sleep due to pain at the post op site, received pain medication as ordered    Patient is not progressing towards the following goals:    Problem: Pain - Standard  Goal: Alleviation of pain or a reduction in pain to the patient’s comfort goal  Outcome: Progressing     Problem: Knowledge Deficit - Standard  Goal: Patient and family/care givers will demonstrate understanding of plan of care, disease process/condition, diagnostic tests and medications  Outcome: Progressing     Problem: Bowel Elimination  Goal: Establish and maintain regular bowel function  Outcome: Progressing     Problem: Gastrointestinal Irritability  Goal: Nausea and vomiting will be absent or improve  Outcome: Progressing     Problem: Early Mobilization - Post Surgery  Goal: Early mobilization post surgery  Outcome: Progressing     Problem: Pain - Post Surgery  Goal: Alleviation or reduction of pain post surgery  Outcome: Progressing     Problem: Wound/ / Incision Healing  Goal: Patient's wound/surgical incision will decrease in size and heals properly  Outcome: Progressing     Problem: Surgical Drain Management  Goal: Proper management/care of surgical drains will be maintained  Outcome: Progressing     Problem: Bowel Elimination - Post Surgical  Goal: Patient will resume regular bowel sounds and function with no discomfort or distention  Outcome: Progressing

## 2022-09-10 NOTE — PROGRESS NOTES
Received patient from Night shift RN . Patient is comfortably sleeping but easily arouse by verbal stimuli. No sign of distress. Still with NGT to right nostril in placed, connected to intermittent low suction, draining light greenish output. Abdomen is still distended, midline incision, noted , CDI dressing and patient is wearing abdominal binder, RODRIGO drain to right quadrant draining serosanguinous. Fall precaution observed, kept bed in lowest position and call light within reach.

## 2022-09-10 NOTE — DIETARY
Nutrition Services: Update   Day 5 of admit.  Jordyn Rick is a 57 y.o. female with admitting DX of Small bowel obstruction (HCC) [K56.609]    Pt is currently NPO x 5 days. Now POD #2 incarcerated ventral hernia repair w/ SBR. Per 9/9 surgery update, continue NPO w/ ice chips + NGT to suction; clamping trial today. Abdomen rounded, tender per flowsheets. +BM 9/9. POC glu 184-219; MD ordered SSI. MAR includes D5NS +KCL.    Malnutrition Risk: NPO x 5 days; no new wts to trend    Recommendations/Plan:  Diet per Surgery/MD. If unable to initiate and advance diet over next 48-72 hours, consider initiation of nutrition support to meet pt's nutritional needs s/p surgery.     RD following.

## 2022-09-10 NOTE — PROGRESS NOTES
Received bedside report from day shift RN at 19:15. Assumed pt care.   Pt seen AO4, pain reported at 8/10, will medicate per MAR.   No nausea reported at this time.   POC discussed.   Safety and fall precautions in place.   Instructed pt to use call light, verbalized understanding. Call light kept within reach.  No other needs at this time.   Will continue to monitor.

## 2022-09-10 NOTE — CARE PLAN
The patient is Stable - Low risk of patient condition declining or worsening    Shift Goals  Clinical Goals: Pt will report decreased pain at 4/10 or lower during this shift  Patient Goals: Ambulate during this shift  Family Goals: N/A    Progress made toward(s) clinical / shift goals:  Ambulated pt during this shift, was able to tolerate pain during ambulation. Pain rated at 4/10 post administration of PRN pain meds per mar. Provided Ice pack for pain, pt seen relaxed and resting comfortably during this shift.    Patient is not progressing towards the following goals: N/A

## 2022-09-11 LAB
ANION GAP SERPL CALC-SCNC: 13 MMOL/L (ref 7–16)
BUN SERPL-MCNC: 5 MG/DL (ref 8–22)
CALCIUM SERPL-MCNC: 8.2 MG/DL (ref 8.4–10.2)
CHLORIDE SERPL-SCNC: 99 MMOL/L (ref 96–112)
CO2 SERPL-SCNC: 28 MMOL/L (ref 20–33)
CREAT SERPL-MCNC: 0.47 MG/DL (ref 0.5–1.4)
ERYTHROCYTE [DISTWIDTH] IN BLOOD BY AUTOMATED COUNT: 55.1 FL (ref 35.9–50)
GFR SERPLBLD CREATININE-BSD FMLA CKD-EPI: 112 ML/MIN/1.73 M 2
GLUCOSE BLD STRIP.AUTO-MCNC: 162 MG/DL (ref 65–99)
GLUCOSE BLD STRIP.AUTO-MCNC: 187 MG/DL (ref 65–99)
GLUCOSE BLD STRIP.AUTO-MCNC: 196 MG/DL (ref 65–99)
GLUCOSE BLD STRIP.AUTO-MCNC: 201 MG/DL (ref 65–99)
GLUCOSE BLD STRIP.AUTO-MCNC: 222 MG/DL (ref 65–99)
GLUCOSE SERPL-MCNC: 208 MG/DL (ref 65–99)
HCT VFR BLD AUTO: 33.3 % (ref 37–47)
HGB BLD-MCNC: 10.1 G/DL (ref 12–16)
MCH RBC QN AUTO: 25.4 PG (ref 27–33)
MCHC RBC AUTO-ENTMCNC: 30.3 G/DL (ref 33.6–35)
MCV RBC AUTO: 83.9 FL (ref 81.4–97.8)
PLATELET # BLD AUTO: 201 K/UL (ref 164–446)
PMV BLD AUTO: 11 FL (ref 9–12.9)
POTASSIUM SERPL-SCNC: 3.2 MMOL/L (ref 3.6–5.5)
RBC # BLD AUTO: 3.97 M/UL (ref 4.2–5.4)
SODIUM SERPL-SCNC: 140 MMOL/L (ref 135–145)
WBC # BLD AUTO: 7.2 K/UL (ref 4.8–10.8)

## 2022-09-11 PROCEDURE — 99232 SBSQ HOSP IP/OBS MODERATE 35: CPT | Performed by: HOSPITALIST

## 2022-09-11 PROCEDURE — A9270 NON-COVERED ITEM OR SERVICE: HCPCS | Performed by: SURGERY

## 2022-09-11 PROCEDURE — 94760 N-INVAS EAR/PLS OXIMETRY 1: CPT

## 2022-09-11 PROCEDURE — 82962 GLUCOSE BLOOD TEST: CPT | Mod: 91

## 2022-09-11 PROCEDURE — 700102 HCHG RX REV CODE 250 W/ 637 OVERRIDE(OP): Performed by: SURGERY

## 2022-09-11 PROCEDURE — 36415 COLL VENOUS BLD VENIPUNCTURE: CPT

## 2022-09-11 PROCEDURE — 80048 BASIC METABOLIC PNL TOTAL CA: CPT

## 2022-09-11 PROCEDURE — A9270 NON-COVERED ITEM OR SERVICE: HCPCS | Performed by: HOSPITALIST

## 2022-09-11 PROCEDURE — 770006 HCHG ROOM/CARE - MED/SURG/GYN SEMI*

## 2022-09-11 PROCEDURE — 700102 HCHG RX REV CODE 250 W/ 637 OVERRIDE(OP): Performed by: INTERNAL MEDICINE

## 2022-09-11 PROCEDURE — 700102 HCHG RX REV CODE 250 W/ 637 OVERRIDE(OP): Performed by: HOSPITALIST

## 2022-09-11 PROCEDURE — 85027 COMPLETE CBC AUTOMATED: CPT

## 2022-09-11 PROCEDURE — A9270 NON-COVERED ITEM OR SERVICE: HCPCS | Performed by: INTERNAL MEDICINE

## 2022-09-11 PROCEDURE — 700111 HCHG RX REV CODE 636 W/ 250 OVERRIDE (IP): Performed by: INTERNAL MEDICINE

## 2022-09-11 RX ORDER — HYDROMORPHONE HYDROCHLORIDE 1 MG/ML
0.5 INJECTION, SOLUTION INTRAMUSCULAR; INTRAVENOUS; SUBCUTANEOUS EVERY 4 HOURS PRN
Status: DISCONTINUED | OUTPATIENT
Start: 2022-09-11 | End: 2022-09-12 | Stop reason: HOSPADM

## 2022-09-11 RX ORDER — OXYCODONE HYDROCHLORIDE 5 MG/1
5 TABLET ORAL EVERY 4 HOURS PRN
Status: DISCONTINUED | OUTPATIENT
Start: 2022-09-11 | End: 2022-09-12 | Stop reason: HOSPADM

## 2022-09-11 RX ORDER — GLIPIZIDE 2.5 MG/1
5 TABLET, EXTENDED RELEASE ORAL DAILY
Status: DISCONTINUED | OUTPATIENT
Start: 2022-09-11 | End: 2022-09-12 | Stop reason: HOSPADM

## 2022-09-11 RX ORDER — POTASSIUM CHLORIDE 20 MEQ/1
20 TABLET, EXTENDED RELEASE ORAL 3 TIMES DAILY
Status: DISCONTINUED | OUTPATIENT
Start: 2022-09-11 | End: 2022-09-12 | Stop reason: HOSPADM

## 2022-09-11 RX ADMIN — INSULIN HUMAN 1 UNITS: 100 INJECTION, SOLUTION PARENTERAL at 05:25

## 2022-09-11 RX ADMIN — INSULIN HUMAN 1 UNITS: 100 INJECTION, SOLUTION PARENTERAL at 00:39

## 2022-09-11 RX ADMIN — OXYCODONE HYDROCHLORIDE 10 MG: 10 TABLET ORAL at 10:39

## 2022-09-11 RX ADMIN — OXYCODONE HYDROCHLORIDE 5 MG: 5 TABLET ORAL at 14:14

## 2022-09-11 RX ADMIN — OXYCODONE HYDROCHLORIDE 10 MG: 10 TABLET ORAL at 00:38

## 2022-09-11 RX ADMIN — POTASSIUM CHLORIDE 20 MEQ: 1500 TABLET, EXTENDED RELEASE ORAL at 17:02

## 2022-09-11 RX ADMIN — HEPARIN SODIUM 5000 UNITS: 5000 INJECTION, SOLUTION INTRAVENOUS; SUBCUTANEOUS at 22:59

## 2022-09-11 RX ADMIN — OXYCODONE HYDROCHLORIDE 10 MG: 10 TABLET ORAL at 06:41

## 2022-09-11 RX ADMIN — METFORMIN HYDROCHLORIDE 1000 MG: 500 TABLET ORAL at 16:56

## 2022-09-11 RX ADMIN — POTASSIUM CHLORIDE 20 MEQ: 1500 TABLET, EXTENDED RELEASE ORAL at 07:47

## 2022-09-11 RX ADMIN — INSULIN HUMAN 1 UNITS: 100 INJECTION, SOLUTION PARENTERAL at 23:34

## 2022-09-11 RX ADMIN — POTASSIUM CHLORIDE 20 MEQ: 1500 TABLET, EXTENDED RELEASE ORAL at 14:15

## 2022-09-11 RX ADMIN — Medication 25 MG: at 22:59

## 2022-09-11 RX ADMIN — INSULIN HUMAN 2 UNITS: 100 INJECTION, SOLUTION PARENTERAL at 17:02

## 2022-09-11 RX ADMIN — HEPARIN SODIUM 5000 UNITS: 5000 INJECTION, SOLUTION INTRAVENOUS; SUBCUTANEOUS at 05:22

## 2022-09-11 RX ADMIN — INSULIN HUMAN 2 UNITS: 100 INJECTION, SOLUTION PARENTERAL at 11:12

## 2022-09-11 RX ADMIN — HEPARIN SODIUM 5000 UNITS: 5000 INJECTION, SOLUTION INTRAVENOUS; SUBCUTANEOUS at 14:13

## 2022-09-11 RX ADMIN — OXYCODONE HYDROCHLORIDE 5 MG: 5 TABLET ORAL at 20:01

## 2022-09-11 RX ADMIN — GLIPIZIDE 5 MG: 2.5 TABLET, EXTENDED RELEASE ORAL at 14:14

## 2022-09-11 RX ADMIN — OXYCODONE HYDROCHLORIDE 10 MG: 10 TABLET ORAL at 03:39

## 2022-09-11 ASSESSMENT — PATIENT HEALTH QUESTIONNAIRE - PHQ9
2. FEELING DOWN, DEPRESSED, IRRITABLE, OR HOPELESS: NOT AT ALL
SUM OF ALL RESPONSES TO PHQ9 QUESTIONS 1 AND 2: 0
1. LITTLE INTEREST OR PLEASURE IN DOING THINGS: NOT AT ALL

## 2022-09-11 ASSESSMENT — ENCOUNTER SYMPTOMS
FEVER: 0
NECK PAIN: 0
TREMORS: 0
MYALGIAS: 0
CONSTITUTIONAL NEGATIVE: 1
STRIDOR: 0
POLYDIPSIA: 0
CLAUDICATION: 0
SHORTNESS OF BREATH: 0
FOCAL WEAKNESS: 0
DIAPHORESIS: 0
COUGH: 0
SEIZURES: 0
HEARTBURN: 0
ORTHOPNEA: 0
NEUROLOGICAL NEGATIVE: 1
ABDOMINAL PAIN: 1
EYES NEGATIVE: 1
CONSTIPATION: 0
DIZZINESS: 0
TINGLING: 0
WEIGHT LOSS: 0
SPEECH CHANGE: 0
DEPRESSION: 0
PND: 0
RESPIRATORY NEGATIVE: 1
NERVOUS/ANXIOUS: 1
EYE PAIN: 0
SORE THROAT: 0
DOUBLE VISION: 0
EYE DISCHARGE: 0
FALLS: 0
NAUSEA: 0
SPUTUM PRODUCTION: 0
WHEEZING: 0
BACK PAIN: 0
MUSCULOSKELETAL NEGATIVE: 1
CARDIOVASCULAR NEGATIVE: 1
MEMORY LOSS: 0

## 2022-09-11 ASSESSMENT — PAIN DESCRIPTION - PAIN TYPE
TYPE: ACUTE PAIN
TYPE: ACUTE PAIN;SURGICAL PAIN
TYPE: ACUTE PAIN
TYPE: ACUTE PAIN
TYPE: ACUTE PAIN;SURGICAL PAIN
TYPE: ACUTE PAIN
TYPE: ACUTE PAIN
TYPE: ACUTE PAIN;SURGICAL PAIN

## 2022-09-11 ASSESSMENT — LIFESTYLE VARIABLES: SUBSTANCE_ABUSE: 0

## 2022-09-11 NOTE — PROGRESS NOTES
Assumed care of pt at 0720, pt is on room air, sleeping in bed with even and unlabored respirations. RN to complete assessment when pt wakes up. Safety precautions in place.    0745 Assessment complete. Pt c/o abdominal pain 3/10 but states its tolerable and declines intervention at this time. Pt sitting up in bed eating breakfast. Midline abdominal incision dressing observed, CDI. RLQ RODRIGO drain observed, scant serosanguinous drainage observed in RODRIGO bulb to suction. Pt agreeable to ambulate today. POC discussed, all questions answered.

## 2022-09-11 NOTE — PROGRESS NOTES
Received bedside report from day shift RN at 19:15. Assumed pt care.   Pt seen AO4, pain reported at 6/10, ice pack provided, changed abdominal pad. Will medicate per mar. No nausea reported.  POC discussed.   Safety and fall precautions in place.   Instructed pt to use call light, verbalized understanding. Call light kept within reach.  No other needs at this time.   Will continue to monitor.

## 2022-09-11 NOTE — PROGRESS NOTES
"9/11:  S:  57 y.o.female s/p Open VHR POD# 3.  Patient doing well overnight, ambulating without issue, NGT was removed, tolerating liquids, having bowel function.  She feels ready to advance diet and try solid good today.  Still some issues with pain control.      O:  /70   Pulse 92   Temp 36.2 °C (97.2 °F) (Temporal)   Resp 18   Ht 1.549 m (5' 1\")   Wt (!) 131 kg (287 lb 14.7 oz)   SpO2 95%   I/O last 3 completed shifts:  In: 3640 [P.O.:2340; I.V.:1300]  Out: 2465 [Urine:600; Drains:25]  Recent Labs     09/10/22  0342 09/11/22  0318   SODIUM 144 140   POTASSIUM 3.1* 3.2*   CHLORIDE 101 99   CO2 30 28   GLUCOSE 213* 208*   BUN 10 5*   CREATININE 0.46* 0.47*   CALCIUM 8.3* 8.2*     Recent Labs     09/10/22  0342 09/11/22  0318   WBC 8.2 7.2   RBC 4.36 3.97*   HEMOGLOBIN 11.2* 10.1*   HEMATOCRIT 36.0* 33.3*   MCV 82.6 83.9   MCH 25.7* 25.4*   MCHC 31.1* 30.3*   RDW 55.0* 55.1*   PLATELETCT 242 201   MPV 10.3 11.0       Alert and Oriented x3, No Acute Distress  Normal Respiratory Effort  Abdomen soft, appropriately tender  Incisions/Bandages clean/dry/intact  Extremities warm and well perfused  RODRIGO Output Serosanguinous - 25cc    A/P:  Pain control with PO meds, minimize narcotics as able  Diet as tolerated  Fluids SLIV  Ambulate tid and ad kiersten  Likely discharge home tomorrow    "

## 2022-09-11 NOTE — CARE PLAN
The patient is Stable - Low risk of patient condition declining or worsening    Shift Goals  Clinical Goals: Pt will be free from fall and injury during this shift  Patient Goals: Sleep comfortably during this shift  Family Goals: N/A    Progress made toward(s) clinical / shift goals:  Safety and fall precautions kept in place, bed locked and lowest position. Call light within reach. Assisted pt when ambulating and repositioning. Pt has been free from fall, injury and has been seen sleeping comfortably during this shift.    Patient is not progressing towards the following goals:

## 2022-09-11 NOTE — CARE PLAN
The patient is Stable - Low risk of patient condition declining or worsening    Shift Goals  Clinical Goals: Monitor patients blood sugar  Patient Goals: pain mngt  Family Goals: N/A    Progress made toward(s) clinical / shift goals:  Patient 's blood sugar is monitored, received insulin as per sliding scale  Patient is still complaining of pain to surgical site, received pain medication as ordered    Patient is not progressing towards the following goals:    Problem: Pain - Standard  Goal: Alleviation of pain or a reduction in pain to the patient’s comfort goal  Outcome: Progressing     Problem: Knowledge Deficit - Standard  Goal: Patient and family/care givers will demonstrate understanding of plan of care, disease process/condition, diagnostic tests and medications  Outcome: Progressing     Problem: Bowel Elimination  Goal: Establish and maintain regular bowel function  Outcome: Progressing     Problem: Gastrointestinal Irritability  Goal: Nausea and vomiting will be absent or improve  Outcome: Progressing     Problem: Risk for Post Op Fluid Imbalance  Goal: Promotion of fluid balance  Outcome: Progressing     Problem: Respiratory/Oxygenation Function Post-Surgical  Goal: Patient will achieve/maintain normal respiratory rate/effort  Outcome: Progressing     Problem: Early Mobilization - Post Surgery  Goal: Early mobilization post surgery  Outcome: Progressing     Problem: Pain - Post Surgery  Goal: Alleviation or reduction of pain post surgery  Outcome: Progressing     Problem: Wound/ / Incision Healing  Goal: Patient's wound/surgical incision will decrease in size and heals properly  Outcome: Progressing     Problem: Surgical Drain Management  Goal: Proper management/care of surgical drains will be maintained  Outcome: Progressing     Problem: Bowel Elimination - Post Surgical  Goal: Patient will resume regular bowel sounds and function with no discomfort or distention  Outcome: Progressing

## 2022-09-11 NOTE — PROGRESS NOTES
[facility] Medicine Daily Progress Note    Date of Service  9/11/2022    Chief Complaint  Jordyn Rick is a 57 y.o. female admitted 9/5/2022 with nausea vomiting and abdominal pain    Hospital Course  9/6/2022-Jordyn is a 57-year-old female who has a ventral hernia and morbid obesity developed severe nausea vomiting at a camping trip.  The patient afterwards was not able to move her bowels.  Patient has not had a bowel movement since then.  Her camping trip was on Friday night.  She came to the hospital on Saturday.  The patient's obstruction has not resolved.  The patient does have a ventral hernia with intestinal obstruction.  NG tube is in place to intermittent suctioning.  Patient is given fluid resuscitation and electrolyte replacement.  She will need a small bowel follow-through to see if she is actually passing stool through all the way to the rectum.  9/7/2022-patient's small bowel follow-through shows an obstruction at the ventral hernia.  The patient at this point will need surgical intervention.  I discussed this with surgery and they will be planning on taking the patient to surgery tomorrow.  In the meantime continue n.p.o. status and NG tube suctioning.  9/8/2022-patient going for surgical solution today.  Patient has a small bowel obstruction at the hernia site.  Postoperatively patient will need pain management.  Patient will need fluid resuscitation.  Patient will need blood pressure management.  9/9/2022-postoperative day #1 after ventral hernia repair surgically.  The patient had incarcerated small bowel in the hernia sac itself.  About a foot of intestine had to be surgically removed.  Side to side anastomosis has been completed.  Patient so far has no bowel sounds.  Patient remains with an NG tube to low intermittent suctioning.  I explained to the patient necessity of the suctioning as well as fluid resuscitation.  Patient very anxious.  Anxiolytic management has been provided.  Patient also  advised of the necessity for Accu-Cheks with sliding scale coverage.  9/10/2022-postoperative day #2.  Patient doing much better today.  Start clear liquids per surgery. Clamping NG, Continue with fluid maintenance.  Pain management at this point stable  Continue PT OT  Anticipate advancing diet in the next 24 to 48 hours and then patient should be able to go home pretty soon.  9/11/2022-Ms. Bey is doing much better today.  She is able to handle the clear liquids and thus at this point we are advancing her to a regular diet.  Patient's pain level is improved.  She is still complaining of pain from 4-9 however has not required any IV pain medications and this is only with the oral oxycodone.  She is ambulating.  Discussed with surgery and plan at this point is possibly to discharge home tomorrow.    Interval Problem Update  NG tube is out  Advance to regular diet  Decrease pain management  Discharge planning to home with home health tomorrow    I have discussed this patient's plan of care and discharge plan at IDT rounds today with Case Management, Nursing, Nursing leadership, and other members of the IDT team.    Consultants/Specialty  general surgery    Code Status  Full Code    Disposition  Patient is not medically cleared for discharge.   Anticipate discharge to to home with close outpatient follow-up.  I have placed the appropriate orders for post-discharge needs.    Review of Systems  Review of Systems   Constitutional: Negative.  Negative for diaphoresis, fever and weight loss.        Patient states she is not really hungry but she would like to have some water and some oral intake.   HENT: Negative.  Negative for congestion, ear pain, hearing loss, sore throat and tinnitus.    Eyes: Negative.  Negative for double vision, pain and discharge.   Respiratory: Negative.  Negative for cough, sputum production, shortness of breath, wheezing and stridor.    Cardiovascular: Negative.  Negative for chest pain,  orthopnea, claudication, leg swelling and PND.   Gastrointestinal:  Positive for abdominal pain (Pain fluctuates for between 4 and 9). Negative for constipation, heartburn and nausea.   Genitourinary: Negative.  Negative for dysuria, frequency and hematuria.   Musculoskeletal: Negative.  Negative for back pain, falls, myalgias and neck pain.   Skin: Negative.  Negative for itching and rash.   Neurological: Negative.  Negative for dizziness, tingling, tremors, speech change, focal weakness and seizures.   Endo/Heme/Allergies: Negative.  Negative for polydipsia.   Psychiatric/Behavioral:  Negative for depression, memory loss, substance abuse and suicidal ideas. The patient is nervous/anxious.    All other systems reviewed and are negative.     Physical Exam  Temp:  [36.1 °C (97 °F)-36.9 °C (98.5 °F)] 36.9 °C (98.5 °F)  Pulse:  [92-98] 98  Resp:  [18] 18  BP: (107-134)/(70-85) 134/75  SpO2:  [93 %-95 %] 93 %    Physical Exam  Vitals and nursing note reviewed. Exam conducted with a chaperone present.   Constitutional:       General: She is not in acute distress.     Appearance: Normal appearance. She is obese. She is not ill-appearing, toxic-appearing or diaphoretic.   HENT:      Head: Normocephalic and atraumatic.      Right Ear: External ear normal. There is no impacted cerumen.      Left Ear: External ear normal. There is no impacted cerumen.      Nose: Nose normal.      Mouth/Throat:      Mouth: Mucous membranes are dry.      Pharynx: No oropharyngeal exudate or posterior oropharyngeal erythema.   Eyes:      General:         Right eye: No discharge.         Left eye: No discharge.      Extraocular Movements: Extraocular movements intact.      Conjunctiva/sclera: Conjunctivae normal.      Pupils: Pupils are equal, round, and reactive to light.   Neck:      Vascular: No carotid bruit.   Cardiovascular:      Rate and Rhythm: Normal rate and regular rhythm.      Pulses: Normal pulses.      Heart sounds: Normal heart  sounds. No murmur heard.    No friction rub. No gallop.   Pulmonary:      Effort: Pulmonary effort is normal. No respiratory distress.      Breath sounds: Normal breath sounds. No stridor. No wheezing or rhonchi.   Abdominal:      General: Bowel sounds are normal. There is distension.      Palpations: Abdomen is soft. There is no mass.      Tenderness: There is generalized abdominal tenderness.      Hernia: No hernia is present. There is no hernia in the ventral area.          Comments: Ventral hernia at this point has been surgically removed and incarcerated hernia has been reduced.   Musculoskeletal:         General: No swelling. Normal range of motion.      Cervical back: Normal range of motion and neck supple. No rigidity.      Right lower leg: No edema.      Left lower leg: No edema.   Skin:     General: Skin is warm and dry.      Capillary Refill: Capillary refill takes less than 2 seconds.      Coloration: Skin is not jaundiced or pale.   Neurological:      General: No focal deficit present.      Mental Status: She is alert and oriented to person, place, and time.      Cranial Nerves: No cranial nerve deficit.      Sensory: No sensory deficit.      Motor: No weakness.      Coordination: Coordination normal.      Gait: Gait normal.      Deep Tendon Reflexes: Reflexes normal.   Psychiatric:         Mood and Affect: Mood normal.         Behavior: Behavior normal.         Thought Content: Thought content normal.         Judgment: Judgment normal.       Fluids    Intake/Output Summary (Last 24 hours) at 9/11/2022 1345  Last data filed at 9/11/2022 1042  Gross per 24 hour   Intake 1860 ml   Output 495 ml   Net 1365 ml       Laboratory  Recent Labs     09/10/22  0342 09/11/22  0318   WBC 8.2 7.2   RBC 4.36 3.97*   HEMOGLOBIN 11.2* 10.1*   HEMATOCRIT 36.0* 33.3*   MCV 82.6 83.9   MCH 25.7* 25.4*   MCHC 31.1* 30.3*   RDW 55.0* 55.1*   PLATELETCT 242 201   MPV 10.3 11.0     Recent Labs     09/10/22  0342 09/11/22  0318    SODIUM 144 140   POTASSIUM 3.1* 3.2*   CHLORIDE 101 99   CO2 30 28   GLUCOSE 213* 208*   BUN 10 5*   CREATININE 0.46* 0.47*   CALCIUM 8.3* 8.2*                   Imaging  DX-ABDOMEN FOR TUBE PLACEMENT   Final Result      NG tube tip overlies the gastric fundus.      DX-SMALL BOWEL SERIES   Final Result      Dilated small intestine. Administered contrast does not reach the colon at 20 hours consistent with high-grade small bowel obstruction.      DX-ABDOMEN FOR TUBE PLACEMENT   Final Result      NG tube extends below the diaphragm with tip at the level of the gastric fundus.      CT-ABDOMEN-PELVIS WITH   Final Result      1.  Large midline lower abdominal ventral hernia containing colonic and small bowel loops, likely causing distal small bowel obstruction.   2.  No evidence of bowel perforation.   3.  Small volume ascites.   4.  Enlarged fatty liver.   5.  Stable nonspecific LEFT adrenal nodule.             Assessment/Plan  * Small bowel obstruction (HCC)- (present on admission)  Assessment & Plan  Postoperative day #3  Abdominal binder  Pain management  Advancing diet to regular  NG tube is out  Patient overall doing better  Ambulate at least 3 times daily  Discharge planning most likely home tomorrow  Discussed with surgery Dr. Holley in detail    Ventral hernia- (present on admission)  Assessment & Plan  Postoperative day #3  Doing well  Clean incision  Staples in place  Outpatient follow-ups for staple removal    Hypokalemia- (present on admission)  Assessment & Plan  Potassium now 3.2  Continue with potassium supplementation    Hyponatremia- (present on admission)  Assessment & Plan  Resolved    SIRS (systemic inflammatory response syndrome) (HCC)- (present on admission)  Assessment & Plan  Resolved    Primary hypertension- (present on admission)  Assessment & Plan  Continue with blood pressure management optimization    DM (diabetes mellitus) (HCC)- (present on admission)  Assessment & Plan  -accus with  sliding scale coverage, resume metformin and glipizide now that she is taking orally  -diabetic diet  -diabetic education  -follow glycohemoglobin levels long term  -continue with oral antihyperglycemics  -monitor for hypoglycemic episodes and adjust control if he should get low    Morbid obesity with BMI of 50.0-59.9, adult (HCC)- (present on admission)  Assessment & Plan  Body mass index is 54.4 kg/m².  Patient will need outpatient weight loss management program as well as lifestyle modification program.       VTE prophylaxis: SCDs/TEDs    I have performed a physical exam and reviewed and updated ROS and Plan today (9/11/2022). In review of yesterday's note (9/10/2022), there are no changes except as documented above.

## 2022-09-11 NOTE — CARE PLAN
The patient is Stable - Low risk of patient condition declining or worsening    Shift Goals  Clinical Goals: Pt will ambulate at least 50ft this shift  Patient Goals:   Family Goals: N/A    Progress made toward(s) clinical / shift goals:  Pt is progressing, ambulating with steady gait and SB assist.    Patient is not progressing towards the following goals:

## 2022-09-12 VITALS
TEMPERATURE: 98 F | HEART RATE: 82 BPM | DIASTOLIC BLOOD PRESSURE: 67 MMHG | OXYGEN SATURATION: 96 % | WEIGHT: 287.92 LBS | SYSTOLIC BLOOD PRESSURE: 116 MMHG | HEIGHT: 61 IN | RESPIRATION RATE: 17 BRPM | BODY MASS INDEX: 54.36 KG/M2

## 2022-09-12 PROBLEM — K43.9 VENTRAL HERNIA: Status: RESOLVED | Noted: 2022-09-05 | Resolved: 2022-09-12

## 2022-09-12 PROBLEM — K56.609 SMALL BOWEL OBSTRUCTION (HCC): Status: RESOLVED | Noted: 2022-09-05 | Resolved: 2022-09-12

## 2022-09-12 PROBLEM — E87.1 HYPONATREMIA: Status: RESOLVED | Noted: 2022-09-05 | Resolved: 2022-09-12

## 2022-09-12 PROBLEM — R65.10 SIRS (SYSTEMIC INFLAMMATORY RESPONSE SYNDROME) (HCC): Status: RESOLVED | Noted: 2022-09-05 | Resolved: 2022-09-12

## 2022-09-12 LAB
ANION GAP SERPL CALC-SCNC: 11 MMOL/L (ref 7–16)
BUN SERPL-MCNC: 5 MG/DL (ref 8–22)
CALCIUM SERPL-MCNC: 8.4 MG/DL (ref 8.4–10.2)
CHLORIDE SERPL-SCNC: 98 MMOL/L (ref 96–112)
CO2 SERPL-SCNC: 28 MMOL/L (ref 20–33)
CREAT SERPL-MCNC: 0.57 MG/DL (ref 0.5–1.4)
ERYTHROCYTE [DISTWIDTH] IN BLOOD BY AUTOMATED COUNT: 54.5 FL (ref 35.9–50)
GFR SERPLBLD CREATININE-BSD FMLA CKD-EPI: 107 ML/MIN/1.73 M 2
GLUCOSE BLD STRIP.AUTO-MCNC: 128 MG/DL (ref 65–99)
GLUCOSE BLD STRIP.AUTO-MCNC: 134 MG/DL (ref 65–99)
GLUCOSE BLD STRIP.AUTO-MCNC: 169 MG/DL (ref 65–99)
GLUCOSE SERPL-MCNC: 155 MG/DL (ref 65–99)
HCT VFR BLD AUTO: 32.3 % (ref 37–47)
HGB BLD-MCNC: 9.8 G/DL (ref 12–16)
MCH RBC QN AUTO: 25.4 PG (ref 27–33)
MCHC RBC AUTO-ENTMCNC: 30.3 G/DL (ref 33.6–35)
MCV RBC AUTO: 83.7 FL (ref 81.4–97.8)
PLATELET # BLD AUTO: 286 K/UL (ref 164–446)
PMV BLD AUTO: 10.6 FL (ref 9–12.9)
POTASSIUM SERPL-SCNC: 3.4 MMOL/L (ref 3.6–5.5)
RBC # BLD AUTO: 3.86 M/UL (ref 4.2–5.4)
SODIUM SERPL-SCNC: 137 MMOL/L (ref 135–145)
WBC # BLD AUTO: 6.5 K/UL (ref 4.8–10.8)

## 2022-09-12 PROCEDURE — 99239 HOSP IP/OBS DSCHRG MGMT >30: CPT | Performed by: INTERNAL MEDICINE

## 2022-09-12 PROCEDURE — A9270 NON-COVERED ITEM OR SERVICE: HCPCS | Performed by: HOSPITALIST

## 2022-09-12 PROCEDURE — 700102 HCHG RX REV CODE 250 W/ 637 OVERRIDE(OP): Performed by: HOSPITALIST

## 2022-09-12 PROCEDURE — 82962 GLUCOSE BLOOD TEST: CPT

## 2022-09-12 PROCEDURE — 85027 COMPLETE CBC AUTOMATED: CPT

## 2022-09-12 PROCEDURE — 700111 HCHG RX REV CODE 636 W/ 250 OVERRIDE (IP): Performed by: INTERNAL MEDICINE

## 2022-09-12 PROCEDURE — 80048 BASIC METABOLIC PNL TOTAL CA: CPT

## 2022-09-12 PROCEDURE — 36415 COLL VENOUS BLD VENIPUNCTURE: CPT

## 2022-09-12 RX ORDER — OXYCODONE HYDROCHLORIDE 5 MG/1
5 TABLET ORAL EVERY 8 HOURS PRN
Qty: 12 TABLET | Refills: 0 | Status: SHIPPED | OUTPATIENT
Start: 2022-09-12 | End: 2022-09-16

## 2022-09-12 RX ORDER — AMOXICILLIN AND CLAVULANATE POTASSIUM 875; 125 MG/1; MG/1
1 TABLET, FILM COATED ORAL 2 TIMES DAILY
Qty: 14 TABLET | Refills: 0 | Status: SHIPPED | OUTPATIENT
Start: 2022-09-12 | End: 2022-09-19

## 2022-09-12 RX ORDER — POTASSIUM CHLORIDE 20 MEQ/1
20 TABLET, EXTENDED RELEASE ORAL 2 TIMES DAILY
Qty: 8 TABLET | Refills: 0 | Status: SHIPPED | OUTPATIENT
Start: 2022-09-12 | End: 2022-09-16

## 2022-09-12 RX ADMIN — OXYCODONE HYDROCHLORIDE 5 MG: 5 TABLET ORAL at 04:36

## 2022-09-12 RX ADMIN — HEPARIN SODIUM 5000 UNITS: 5000 INJECTION, SOLUTION INTRAVENOUS; SUBCUTANEOUS at 06:25

## 2022-09-12 RX ADMIN — HEPARIN SODIUM 5000 UNITS: 5000 INJECTION, SOLUTION INTRAVENOUS; SUBCUTANEOUS at 14:45

## 2022-09-12 RX ADMIN — POTASSIUM CHLORIDE 20 MEQ: 1500 TABLET, EXTENDED RELEASE ORAL at 06:26

## 2022-09-12 RX ADMIN — OXYCODONE HYDROCHLORIDE 5 MG: 5 TABLET ORAL at 06:25

## 2022-09-12 RX ADMIN — OXYCODONE HYDROCHLORIDE 5 MG: 5 TABLET ORAL at 00:16

## 2022-09-12 RX ADMIN — OXYCODONE HYDROCHLORIDE 5 MG: 5 TABLET ORAL at 11:31

## 2022-09-12 RX ADMIN — METFORMIN HYDROCHLORIDE 1000 MG: 500 TABLET ORAL at 06:25

## 2022-09-12 RX ADMIN — INSULIN HUMAN 1 UNITS: 100 INJECTION, SOLUTION PARENTERAL at 10:13

## 2022-09-12 RX ADMIN — GLIPIZIDE 5 MG: 2.5 TABLET, EXTENDED RELEASE ORAL at 06:26

## 2022-09-12 RX ADMIN — POTASSIUM CHLORIDE 20 MEQ: 1500 TABLET, EXTENDED RELEASE ORAL at 11:32

## 2022-09-12 ASSESSMENT — PAIN DESCRIPTION - PAIN TYPE
TYPE: ACUTE PAIN;SURGICAL PAIN

## 2022-09-12 ASSESSMENT — PATIENT HEALTH QUESTIONNAIRE - PHQ9
1. LITTLE INTEREST OR PLEASURE IN DOING THINGS: NOT AT ALL
SUM OF ALL RESPONSES TO PHQ9 QUESTIONS 1 AND 2: 0

## 2022-09-12 NOTE — FACE TO FACE
Face to Face Note  -  Durable Medical Equipment    Jaspreet Denise M.D. - NPI: [id number]  I certify that this patient is under my care and that they had a durable medical equipment(DME)face to face encounter by myself that meets the physician DME face-to-face encounter requirements with this patient on:    Date of encounter:   Patient:                    MRN:                       YOB: 2021  Jordyn Rick  [id number]  1965     The encounter with the patient was in whole, or in part, for the following medical condition, which is the primary reason for durable medical equipment:  Post-Op Surgery    I certify that, based on my findings, the following durable medical equipment is medically necessary:    Other DME Equipment - Raised toilet seat .    My Clinical findings support the need for the above equipment due to:  Wound/Incision, Other - obesity

## 2022-09-12 NOTE — PROGRESS NOTES
Discharge instructions given and discussed, signed copy in chart. Pt verbalized understanding and all questions answered. Prescriptions sent to pharmacy electronically. Pt discharged home in stable condition on room air escorted by friend. Personal belongings verified with patient. IV removed and tolerated well.

## 2022-09-12 NOTE — CARE PLAN
The patient is Stable - Low risk of patient condition declining or worsening, not in physical or respiratory distress    Shift Goals  Clinical Goals: Pt will ambulate at least 50ft this shift  Patient Goals: Sleep comfortably during this shift  Family Goals: N/A    Progress made toward(s) clinical / shift goals:  better pain control    Patient is not progressing towards the following goals:

## 2022-09-12 NOTE — DISCHARGE PLANNING
Received Choice form at [id number]  Agency/Facility Name: [facility]   Referral sent per Choice form @ [phone]

## 2022-09-12 NOTE — DISCHARGE PLANNING
Case Management Discharge Planning    Admission Date: 9/5/2022  GMLOS: 5.7  ALOS: 7    6-Clicks ADL Score: 21  6-Clicks Mobility Score: 19      Anticipated Discharge Dispo: Discharge Disposition: Discharged to home/self care (01)    DME Needed: No    Action(s) Taken: Updated Provider/Nurse on Discharge Plan    No anticipated DC needs. RN CM will continue to follow.     1510: RN CM faxed choice for [facility] to [facility].     Escalations Completed: None    Medically Clear: Yes    Next Steps: DC home    Barriers to Discharge: Medical clearance

## 2022-09-12 NOTE — DISCHARGE SUMMARY
Discharge Summary    CHIEF COMPLAINT ON ADMISSION  Chief Complaint   Patient presents with    Abdominal Pain    Constipation    Back Pain       Reason for Admission  Constipation, Back Pain, Abdominal*     Admission Date  9/5/2022    CODE STATUS  Full Code    HPI & HOSPITAL COURSE  This is a 57 y.o. female with a past medical history of Bell's palsy, T2DM, gout, hypertension, hyperlipidemia admitted on 09/05/2022 for worsening abdominal pain, constipation.  Patient reported multiple vomiting episodes prior to admission.  She had severe stabbing abdominal pain and ongoing nausea and vomiting.  Patient was diagnosed over a year ago by Dr. Win with a large incarcerated umbilical hernia but unable to do surgery until she was able to lose about 100 pounds.  Patient's last bowel movement was about 4 days ago prior to coming to the ER.  In the ED she was found to be tachypneic and hypertensive, CT scan showed large midline lower abdominal ventral hernia containing colonic and small bowel loops causing small bowel obstruction, no evidence of perforation or free air.  General surgery was consulted,   Who initially recommended conservative management, NG tube placement.    On 09/08 patient had worsening status, went to the OR for open hernia repair.  General surgery Dr. Pepper performed open primary incarcerated ventral hernia repair with small bowel resection.  RODRIGO drain was placed during procedure.  NG tube was continued to low intermittent suction given after surgery.  This was removed on 09/10/2022.  Patient had retained RODRIGO drain upon discharge.  Patient is to return to general surgery clinic for staple removal as well as RODRIGO drain assessment.  Discharged patient on Augmentin for 7 days with oxycodone 5 mg for pain control.  Instructions given to patient on wound care management, follow-ups and nutrition.    Therefore, she is discharged in good and stable condition to home with close outpatient follow-up.    The  patient met 2-midnight criteria for an inpatient stay at the time of discharge.    Discharge Date  09/12/2022    FOLLOW UP ITEMS POST DISCHARGE  With PCP and [facility]    DISCHARGE DIAGNOSES  Principal Problem (Resolved):    Small bowel obstruction (HCC) POA: Yes  Active Problems:    DM (diabetes mellitus) (HCC) POA: Yes    Primary hypertension POA: Yes    Morbid obesity with BMI of 50.0-59.9, adult (HCC) POA: Yes    Hypokalemia POA: Yes  Resolved Problems:    Ventral hernia POA: Yes    SIRS (systemic inflammatory response syndrome) (HCC) POA: Yes    Hyponatremia POA: Yes      FOLLOW UP  No future appointments.  Swapna Ardon M.D.  [address]  [place] [address] [phone]    Call in 1 week(s)  Please follow up for post hospital visit    Myranda Pepper M.D.  [address] [facility]  [place] [id number] [phone]    Call in 1 week(s)  Please follow up for post surgical visit.   Procedure: OPEN PRIMARY INCARCERATED VENTRAL HERNIA REPAIR, SMALL BOWEL RESECTION with RODRIGO drain retained. Please follow up to remove RODRIGO drain and staples in 2 weeks after your surgery (by 09/22)      MEDICATIONS ON DISCHARGE     Medication List        START taking these medications        Instructions   amoxicillin-clavulanate 875-125 MG Tabs  Commonly known as: AUGMENTIN   Take 1 Tablet by mouth 2 times a day for 7 days.  Dose: 1 Tablet     oxyCODONE immediate-release 5 MG Tabs  Commonly known as: ROXICODONE   Take 1 Tablet by mouth every 8 hours as needed for Severe Pain for up to 4 days.  Dose: 5 mg     potassium chloride SA 20 MEQ Tbcr  Commonly known as: Kdur   Take 1 Tablet by mouth 2 times a day for 4 days.  Dose: 20 mEq            CONTINUE taking these medications        Instructions   acyclovir 400 MG tablet  Commonly known as: Zovirax   Take 400 mg by mouth 2 times a day.  Dose: 400 mg     glipiZIDE SR 5 MG Tb24  Commonly known as: GLUCOTROL   Take 5 mg by mouth every day.  Dose: 5 mg     metformin 1000  MG tablet  Commonly known as: GLUCOPHAGE   Take 1,000 mg by mouth 2 times a day, with meals.  Dose: 1,000 mg     SLEEP AID PO   Take 1 Tablet by mouth at bedtime as needed (For sleep). OTC  Dose: 1 Tablet     traZODone 50 MG Tabs  Commonly known as: DESYREL   Take 50 mg by mouth at bedtime as needed for Sleep.  Dose: 50 mg            STOP taking these medications      GARLIC PO     ibuprofen 200 MG Tabs  Commonly known as: MOTRIN              Allergies  Allergies   Allergen Reactions    Latex Rash     Rash         DIET  Orders Placed This Encounter   Procedures    Diet Order Diet: Consistent CHO (Diabetic); Miscellaneous modifications: (optional): Vegetarian     Standing Status:   Standing     Number of Occurrences:   1     Order Specific Question:   Diet:     Answer:   Consistent CHO (Diabetic) [4]     Order Specific Question:   Miscellaneous modifications: (optional)     Answer:   Vegetarian [13]       ACTIVITY  As tolerated.  Weight bearing as tolerated    CONSULTATIONS  General Surgery - Dr. Sterling Win, Dr. Myranda Pepper    PROCEDURES  09/08/22 - OPEN PRIMARY INCARCERATED VENTRAL HERNIA REPAIR, SMALL BOWEL RESECTION with Dr. Myranda Pepper. RODRIGO drain retained from surgery.    LABORATORY  Lab Results   Component Value Date    SODIUM 137 09/12/2022    POTASSIUM 3.4 (L) 09/12/2022    CHLORIDE 98 09/12/2022    CO2 28 09/12/2022    GLUCOSE 155 (H) 09/12/2022    BUN 5 (L) 09/12/2022    CREATININE 0.57 09/12/2022        Lab Results   Component Value Date    WBC 6.5 09/12/2022    HEMOGLOBIN 9.8 (L) 09/12/2022    HEMATOCRIT 32.3 (L) 09/12/2022    PLATELETCT 286 09/12/2022        Total time of the discharge process exceeds 38 minutes.

## 2022-09-12 NOTE — PROGRESS NOTES
Assumed care of pt at 0730, pt is alert and oriented, on room air, c/o abdominal incision site pain 6/10 at this time, ice pack in place but declines further intervention. Assessment complete. RLQ RODRIGO observed, to bulb suction with scant serosanguinous drainage in bulb. Abdominal midline incision dressing observed, CDI. Pt sitting up in bed eating breakfast, tolerating regular diet without c/o nausea or increased pain. Safety precautions in place.

## 2022-10-13 ENCOUNTER — HOSPITAL ENCOUNTER (INPATIENT)
Facility: MEDICAL CENTER | Age: 57
LOS: 6 days | DRG: 872 | End: 2022-10-19
Attending: EMERGENCY MEDICINE | Admitting: INTERNAL MEDICINE
Payer: MEDICAID

## 2022-10-13 ENCOUNTER — APPOINTMENT (OUTPATIENT)
Dept: RADIOLOGY | Facility: MEDICAL CENTER | Age: 57
DRG: 872 | End: 2022-10-13
Attending: EMERGENCY MEDICINE
Payer: MEDICAID

## 2022-10-13 DIAGNOSIS — T81.49XA ABSCESS AFTER PROCEDURE: ICD-10-CM

## 2022-10-13 DIAGNOSIS — L02.211 ABSCESS OF ABDOMINAL WALL: ICD-10-CM

## 2022-10-13 DIAGNOSIS — L08.9 WOUND INFECTION: ICD-10-CM

## 2022-10-13 DIAGNOSIS — A41.9 SEPSIS WITHOUT ACUTE ORGAN DYSFUNCTION, DUE TO UNSPECIFIED ORGANISM (HCC): ICD-10-CM

## 2022-10-13 DIAGNOSIS — T14.8XXA WOUND INFECTION: ICD-10-CM

## 2022-10-13 DIAGNOSIS — K63.2 ENTEROCUTANEOUS FISTULA: ICD-10-CM

## 2022-10-13 LAB
ALBUMIN SERPL BCP-MCNC: 3.4 G/DL (ref 3.2–4.9)
ALBUMIN/GLOB SERPL: 0.7 G/DL
ALP SERPL-CCNC: 78 U/L (ref 30–99)
ALT SERPL-CCNC: 9 U/L (ref 2–50)
ANION GAP SERPL CALC-SCNC: 19 MMOL/L (ref 7–16)
APPEARANCE UR: CLEAR
AST SERPL-CCNC: 11 U/L (ref 12–45)
BACTERIA #/AREA URNS HPF: ABNORMAL /HPF
BASOPHILS # BLD AUTO: 0.2 % (ref 0–1.8)
BASOPHILS # BLD: 0.04 K/UL (ref 0–0.12)
BILIRUB SERPL-MCNC: 0.4 MG/DL (ref 0.1–1.5)
BILIRUB UR QL STRIP.AUTO: NEGATIVE
BUN SERPL-MCNC: 13 MG/DL (ref 8–22)
CALCIUM SERPL-MCNC: 9.4 MG/DL (ref 8.4–10.2)
CHLORIDE SERPL-SCNC: 97 MMOL/L (ref 96–112)
CO2 SERPL-SCNC: 18 MMOL/L (ref 20–33)
COLOR UR: YELLOW
CREAT SERPL-MCNC: 0.75 MG/DL (ref 0.5–1.4)
EOSINOPHIL # BLD AUTO: 0.03 K/UL (ref 0–0.51)
EOSINOPHIL NFR BLD: 0.2 % (ref 0–6.9)
EPI CELLS #/AREA URNS HPF: ABNORMAL /HPF
ERYTHROCYTE [DISTWIDTH] IN BLOOD BY AUTOMATED COUNT: 48.5 FL (ref 35.9–50)
GFR SERPLBLD CREATININE-BSD FMLA CKD-EPI: 93 ML/MIN/1.73 M 2
GLOBULIN SER CALC-MCNC: 4.6 G/DL (ref 1.9–3.5)
GLUCOSE BLD STRIP.AUTO-MCNC: 80 MG/DL (ref 65–99)
GLUCOSE BLD STRIP.AUTO-MCNC: 90 MG/DL (ref 65–99)
GLUCOSE SERPL-MCNC: 191 MG/DL (ref 65–99)
GLUCOSE UR STRIP.AUTO-MCNC: NEGATIVE MG/DL
HCT VFR BLD AUTO: 32.1 % (ref 37–47)
HGB BLD-MCNC: 10 G/DL (ref 12–16)
HYALINE CASTS #/AREA URNS LPF: ABNORMAL /LPF
IMM GRANULOCYTES # BLD AUTO: 0.2 K/UL (ref 0–0.11)
IMM GRANULOCYTES NFR BLD AUTO: 1.2 % (ref 0–0.9)
INR PPP: 1.25 (ref 0.87–1.13)
KETONES UR STRIP.AUTO-MCNC: 40 MG/DL
LACTATE SERPL-SCNC: 1.8 MMOL/L (ref 0.5–2)
LEUKOCYTE ESTERASE UR QL STRIP.AUTO: NEGATIVE
LYMPHOCYTES # BLD AUTO: 0.57 K/UL (ref 1–4.8)
LYMPHOCYTES NFR BLD: 3.4 % (ref 22–41)
MAGNESIUM SERPL-MCNC: 1.8 MG/DL (ref 1.5–2.5)
MCH RBC QN AUTO: 23.9 PG (ref 27–33)
MCHC RBC AUTO-ENTMCNC: 31.2 G/DL (ref 33.6–35)
MCV RBC AUTO: 76.6 FL (ref 81.4–97.8)
MICRO URNS: ABNORMAL
MONOCYTES # BLD AUTO: 0.58 K/UL (ref 0–0.85)
MONOCYTES NFR BLD AUTO: 3.5 % (ref 0–13.4)
MUCOUS THREADS #/AREA URNS HPF: ABNORMAL /HPF
NEUTROPHILS # BLD AUTO: 15.26 K/UL (ref 2–7.15)
NEUTROPHILS NFR BLD: 91.5 % (ref 44–72)
NITRITE UR QL STRIP.AUTO: NEGATIVE
NRBC # BLD AUTO: 0 K/UL
NRBC BLD-RTO: 0 /100 WBC
PH UR STRIP.AUTO: 6 [PH] (ref 5–8)
PLATELET # BLD AUTO: 483 K/UL (ref 164–446)
PMV BLD AUTO: 9.3 FL (ref 9–12.9)
POTASSIUM SERPL-SCNC: 3.5 MMOL/L (ref 3.6–5.5)
PROCALCITONIN SERPL-MCNC: 0.21 NG/ML
PROT SERPL-MCNC: 8 G/DL (ref 6–8.2)
PROT UR QL STRIP: NEGATIVE MG/DL
PROTHROMBIN TIME: 15.2 SEC (ref 12–14.6)
RBC # BLD AUTO: 4.19 M/UL (ref 4.2–5.4)
RBC # URNS HPF: ABNORMAL /HPF
RBC UR QL AUTO: ABNORMAL
SODIUM SERPL-SCNC: 134 MMOL/L (ref 135–145)
SP GR UR STRIP.AUTO: 1.01
UNIDENT CRYS URNS QL MICRO: ABNORMAL /HPF
WBC # BLD AUTO: 16.7 K/UL (ref 4.8–10.8)
WBC #/AREA URNS HPF: ABNORMAL /HPF

## 2022-10-13 PROCEDURE — 83605 ASSAY OF LACTIC ACID: CPT

## 2022-10-13 PROCEDURE — 94760 N-INVAS EAR/PLS OXIMETRY 1: CPT

## 2022-10-13 PROCEDURE — 81001 URINALYSIS AUTO W/SCOPE: CPT

## 2022-10-13 PROCEDURE — 80053 COMPREHEN METABOLIC PANEL: CPT

## 2022-10-13 PROCEDURE — 87040 BLOOD CULTURE FOR BACTERIA: CPT

## 2022-10-13 PROCEDURE — 83735 ASSAY OF MAGNESIUM: CPT

## 2022-10-13 PROCEDURE — 700111 HCHG RX REV CODE 636 W/ 250 OVERRIDE (IP): Performed by: EMERGENCY MEDICINE

## 2022-10-13 PROCEDURE — 87086 URINE CULTURE/COLONY COUNT: CPT

## 2022-10-13 PROCEDURE — 36415 COLL VENOUS BLD VENIPUNCTURE: CPT

## 2022-10-13 PROCEDURE — 700102 HCHG RX REV CODE 250 W/ 637 OVERRIDE(OP): Performed by: INTERNAL MEDICINE

## 2022-10-13 PROCEDURE — 770006 HCHG ROOM/CARE - MED/SURG/GYN SEMI*

## 2022-10-13 PROCEDURE — 99223 1ST HOSP IP/OBS HIGH 75: CPT | Mod: AI | Performed by: INTERNAL MEDICINE

## 2022-10-13 PROCEDURE — 82962 GLUCOSE BLOOD TEST: CPT

## 2022-10-13 PROCEDURE — 700105 HCHG RX REV CODE 258: Performed by: INTERNAL MEDICINE

## 2022-10-13 PROCEDURE — 700111 HCHG RX REV CODE 636 W/ 250 OVERRIDE (IP): Performed by: INTERNAL MEDICINE

## 2022-10-13 PROCEDURE — 700101 HCHG RX REV CODE 250: Performed by: INTERNAL MEDICINE

## 2022-10-13 PROCEDURE — 96365 THER/PROPH/DIAG IV INF INIT: CPT

## 2022-10-13 PROCEDURE — 85025 COMPLETE CBC W/AUTO DIFF WBC: CPT

## 2022-10-13 PROCEDURE — 99285 EMERGENCY DEPT VISIT HI MDM: CPT

## 2022-10-13 PROCEDURE — 700101 HCHG RX REV CODE 250: Performed by: EMERGENCY MEDICINE

## 2022-10-13 PROCEDURE — 74177 CT ABD & PELVIS W/CONTRAST: CPT

## 2022-10-13 PROCEDURE — 700117 HCHG RX CONTRAST REV CODE 255: Performed by: EMERGENCY MEDICINE

## 2022-10-13 PROCEDURE — 700105 HCHG RX REV CODE 258: Performed by: EMERGENCY MEDICINE

## 2022-10-13 PROCEDURE — 96375 TX/PRO/DX INJ NEW DRUG ADDON: CPT

## 2022-10-13 PROCEDURE — 85610 PROTHROMBIN TIME: CPT

## 2022-10-13 PROCEDURE — 84145 PROCALCITONIN (PCT): CPT

## 2022-10-13 RX ORDER — AMOXICILLIN 250 MG
2 CAPSULE ORAL 2 TIMES DAILY PRN
Status: DISCONTINUED | OUTPATIENT
Start: 2022-10-13 | End: 2022-10-19 | Stop reason: HOSPADM

## 2022-10-13 RX ORDER — ACETAMINOPHEN 325 MG/1
650 TABLET ORAL EVERY 6 HOURS PRN
Status: DISCONTINUED | OUTPATIENT
Start: 2022-10-13 | End: 2022-10-14

## 2022-10-13 RX ORDER — PROCHLORPERAZINE EDISYLATE 5 MG/ML
5-10 INJECTION INTRAMUSCULAR; INTRAVENOUS EVERY 4 HOURS PRN
Status: DISCONTINUED | OUTPATIENT
Start: 2022-10-13 | End: 2022-10-19 | Stop reason: HOSPADM

## 2022-10-13 RX ORDER — ACETAMINOPHEN 500 MG
500-1000 TABLET ORAL EVERY 6 HOURS PRN
COMMUNITY

## 2022-10-13 RX ORDER — METRONIDAZOLE 500 MG/100ML
500 INJECTION, SOLUTION INTRAVENOUS ONCE
Status: COMPLETED | OUTPATIENT
Start: 2022-10-13 | End: 2022-10-13

## 2022-10-13 RX ORDER — SULFAMETHOXAZOLE AND TRIMETHOPRIM 800; 160 MG/1; MG/1
1 TABLET ORAL 2 TIMES DAILY
Status: ON HOLD | COMMUNITY
Start: 2022-10-06 | End: 2022-10-19

## 2022-10-13 RX ORDER — METRONIDAZOLE 500 MG/100ML
500 INJECTION, SOLUTION INTRAVENOUS EVERY 8 HOURS
Status: DISCONTINUED | OUTPATIENT
Start: 2022-10-13 | End: 2022-10-17

## 2022-10-13 RX ORDER — SODIUM CHLORIDE 9 MG/ML
INJECTION, SOLUTION INTRAVENOUS CONTINUOUS
Status: ACTIVE | OUTPATIENT
Start: 2022-10-13 | End: 2022-10-15

## 2022-10-13 RX ORDER — ENOXAPARIN SODIUM 100 MG/ML
60 INJECTION SUBCUTANEOUS 2 TIMES DAILY
Status: DISCONTINUED | OUTPATIENT
Start: 2022-10-13 | End: 2022-10-19 | Stop reason: HOSPADM

## 2022-10-13 RX ORDER — DIPHENHYDRAMINE HYDROCHLORIDE 50 MG/ML
12.5 INJECTION INTRAMUSCULAR; INTRAVENOUS ONCE
Status: COMPLETED | OUTPATIENT
Start: 2022-10-13 | End: 2022-10-13

## 2022-10-13 RX ORDER — LABETALOL HYDROCHLORIDE 5 MG/ML
10 INJECTION, SOLUTION INTRAVENOUS EVERY 4 HOURS PRN
Status: DISCONTINUED | OUTPATIENT
Start: 2022-10-13 | End: 2022-10-19 | Stop reason: HOSPADM

## 2022-10-13 RX ORDER — DEXTROSE MONOHYDRATE 25 G/50ML
25 INJECTION, SOLUTION INTRAVENOUS
Status: DISCONTINUED | OUTPATIENT
Start: 2022-10-13 | End: 2022-10-19 | Stop reason: HOSPADM

## 2022-10-13 RX ORDER — ONDANSETRON 2 MG/ML
4 INJECTION INTRAMUSCULAR; INTRAVENOUS EVERY 4 HOURS PRN
Status: DISCONTINUED | OUTPATIENT
Start: 2022-10-13 | End: 2022-10-19 | Stop reason: HOSPADM

## 2022-10-13 RX ORDER — MORPHINE SULFATE 4 MG/ML
2 INJECTION INTRAVENOUS
Status: DISCONTINUED | OUTPATIENT
Start: 2022-10-13 | End: 2022-10-14

## 2022-10-13 RX ORDER — POLYETHYLENE GLYCOL 3350 17 G/17G
1 POWDER, FOR SOLUTION ORAL
Status: DISCONTINUED | OUTPATIENT
Start: 2022-10-13 | End: 2022-10-19 | Stop reason: HOSPADM

## 2022-10-13 RX ORDER — IBUPROFEN 200 MG
800 TABLET ORAL EVERY 8 HOURS PRN
COMMUNITY
End: 2022-11-02

## 2022-10-13 RX ORDER — CEFTRIAXONE 2 G/1
2 INJECTION, POWDER, FOR SOLUTION INTRAMUSCULAR; INTRAVENOUS ONCE
Status: COMPLETED | OUTPATIENT
Start: 2022-10-13 | End: 2022-10-13

## 2022-10-13 RX ORDER — ONDANSETRON 4 MG/1
4 TABLET, ORALLY DISINTEGRATING ORAL EVERY 4 HOURS PRN
Status: DISCONTINUED | OUTPATIENT
Start: 2022-10-13 | End: 2022-10-19 | Stop reason: HOSPADM

## 2022-10-13 RX ORDER — POTASSIUM CHLORIDE 7.45 MG/ML
10 INJECTION INTRAVENOUS
Status: COMPLETED | OUTPATIENT
Start: 2022-10-13 | End: 2022-10-13

## 2022-10-13 RX ORDER — PROMETHAZINE HYDROCHLORIDE 25 MG/1
12.5-25 TABLET ORAL EVERY 4 HOURS PRN
Status: DISCONTINUED | OUTPATIENT
Start: 2022-10-13 | End: 2022-10-19 | Stop reason: HOSPADM

## 2022-10-13 RX ORDER — BISACODYL 10 MG
10 SUPPOSITORY, RECTAL RECTAL
Status: DISCONTINUED | OUTPATIENT
Start: 2022-10-13 | End: 2022-10-19 | Stop reason: HOSPADM

## 2022-10-13 RX ORDER — MORPHINE SULFATE 4 MG/ML
4 INJECTION INTRAVENOUS
Status: DISCONTINUED | OUTPATIENT
Start: 2022-10-13 | End: 2022-10-14

## 2022-10-13 RX ORDER — PROMETHAZINE HYDROCHLORIDE 25 MG/1
12.5-25 SUPPOSITORY RECTAL EVERY 4 HOURS PRN
Status: DISCONTINUED | OUTPATIENT
Start: 2022-10-13 | End: 2022-10-19 | Stop reason: HOSPADM

## 2022-10-13 RX ORDER — GLIPIZIDE 5 MG/1
5 TABLET ORAL DAILY
COMMUNITY

## 2022-10-13 RX ORDER — SODIUM CHLORIDE 9 MG/ML
30 INJECTION, SOLUTION INTRAVENOUS ONCE
Status: COMPLETED | OUTPATIENT
Start: 2022-10-13 | End: 2022-10-13

## 2022-10-13 RX ADMIN — METRONIDAZOLE 500 MG: 5 INJECTION, SOLUTION INTRAVENOUS at 10:57

## 2022-10-13 RX ADMIN — IOHEXOL 20 ML: 240 INJECTION, SOLUTION INTRATHECAL; INTRAVASCULAR; INTRAVENOUS; ORAL at 13:09

## 2022-10-13 RX ADMIN — METRONIDAZOLE 500 MG: 5 INJECTION, SOLUTION INTRAVENOUS at 20:12

## 2022-10-13 RX ADMIN — SODIUM CHLORIDE 3780 ML: 9 INJECTION, SOLUTION INTRAVENOUS at 10:47

## 2022-10-13 RX ADMIN — ENOXAPARIN SODIUM 60 MG: 60 INJECTION SUBCUTANEOUS at 17:59

## 2022-10-13 RX ADMIN — POTASSIUM CHLORIDE 10 MEQ: 7.46 INJECTION, SOLUTION INTRAVENOUS at 15:00

## 2022-10-13 RX ADMIN — POTASSIUM CHLORIDE 10 MEQ: 7.46 INJECTION, SOLUTION INTRAVENOUS at 19:34

## 2022-10-13 RX ADMIN — CEFTRIAXONE SODIUM 2 G: 2 INJECTION, POWDER, FOR SOLUTION INTRAMUSCULAR; INTRAVENOUS at 10:48

## 2022-10-13 RX ADMIN — MORPHINE SULFATE 4 MG: 4 INJECTION INTRAVENOUS at 17:58

## 2022-10-13 RX ADMIN — IOHEXOL 100 ML: 350 INJECTION, SOLUTION INTRAVENOUS at 13:09

## 2022-10-13 RX ADMIN — POTASSIUM CHLORIDE 10 MEQ: 7.46 INJECTION, SOLUTION INTRAVENOUS at 20:35

## 2022-10-13 RX ADMIN — SODIUM CHLORIDE: 9 INJECTION, SOLUTION INTRAVENOUS at 17:59

## 2022-10-13 RX ADMIN — DIPHENHYDRAMINE HYDROCHLORIDE 12.5 MG: 50 INJECTION INTRAMUSCULAR; INTRAVENOUS at 22:49

## 2022-10-13 RX ADMIN — POTASSIUM CHLORIDE 10 MEQ: 7.46 INJECTION, SOLUTION INTRAVENOUS at 17:58

## 2022-10-13 ASSESSMENT — LIFESTYLE VARIABLES
CONSUMPTION TOTAL: NEGATIVE
EVER FELT BAD OR GUILTY ABOUT YOUR DRINKING: NO
AVERAGE NUMBER OF DAYS PER WEEK YOU HAVE A DRINK CONTAINING ALCOHOL: 1
HOW MANY TIMES IN THE PAST YEAR HAVE YOU HAD 5 OR MORE DRINKS IN A DAY: 0
HAVE YOU EVER FELT YOU SHOULD CUT DOWN ON YOUR DRINKING: NO
TOTAL SCORE: 0
ALCOHOL_USE: YES
TOTAL SCORE: 0
EVER HAD A DRINK FIRST THING IN THE MORNING TO STEADY YOUR NERVES TO GET RID OF A HANGOVER: NO
HAVE PEOPLE ANNOYED YOU BY CRITICIZING YOUR DRINKING: NO
TOTAL SCORE: 0
ON A TYPICAL DAY WHEN YOU DRINK ALCOHOL HOW MANY DRINKS DO YOU HAVE: 1

## 2022-10-13 ASSESSMENT — COGNITIVE AND FUNCTIONAL STATUS - GENERAL
CLIMB 3 TO 5 STEPS WITH RAILING: A LITTLE
DAILY ACTIVITIY SCORE: 23
MOBILITY SCORE: 22
SUGGESTED CMS G CODE MODIFIER DAILY ACTIVITY: CI
SUGGESTED CMS G CODE MODIFIER MOBILITY: CJ
TURNING FROM BACK TO SIDE WHILE IN FLAT BAD: A LITTLE
DRESSING REGULAR UPPER BODY CLOTHING: A LITTLE

## 2022-10-13 ASSESSMENT — ENCOUNTER SYMPTOMS
COUGH: 0
ABDOMINAL PAIN: 1
FEVER: 1
CHILLS: 1
MYALGIAS: 0
NAUSEA: 1
DIARRHEA: 0
CONSTIPATION: 0
BLURRED VISION: 0
VOMITING: 0
DIZZINESS: 0
HEADACHES: 0
SHORTNESS OF BREATH: 0
DEPRESSION: 0

## 2022-10-13 ASSESSMENT — PATIENT HEALTH QUESTIONNAIRE - PHQ9
1. LITTLE INTEREST OR PLEASURE IN DOING THINGS: NOT AT ALL
2. FEELING DOWN, DEPRESSED, IRRITABLE, OR HOPELESS: NOT AT ALL
SUM OF ALL RESPONSES TO PHQ9 QUESTIONS 1 AND 2: 0

## 2022-10-13 ASSESSMENT — PAIN DESCRIPTION - PAIN TYPE
TYPE: ACUTE PAIN
TYPE: ACUTE PAIN

## 2022-10-13 ASSESSMENT — FIBROSIS 4 INDEX: FIB4 SCORE: 0.89

## 2022-10-13 NOTE — ED PROVIDER NOTES
"This patient was cared for during the COVID-19 pandemic. History and physical exam may be limited/truncated by the inherent challenges of PPE and the need to decrease staff exposure to novel coronavirus. Some aspects of disease management may be different to protect staff and help slow the spread of disease. I verified that, if possible, the patient was wearing a mask and I was wearing appropriate PPE every time I encountered the patient.     ED Provider Note    Scribed for Carmen Nicholas M.D. by Itz John. 10/13/2022, 9:39 AM.    Primary care provider: Swapna Ardon M.D.  Means of arrival: Walk in  History obtained from: Patient  History limited by: None    CHIEF COMPLAINT  Chief Complaint   Patient presents with    Post-Op Complications     Wound to abd draining large amount of grey liquid, opened this am, pt reports night sweats x 3 nights       HPI  Jordyn Rick is a 57 y.o. female who presents to the Emergency Department for evaluation of a moderate post-operative complication onset 3 days ago. The patient states that approximately 3 weeks ago following a ventral hernia repair and bowel resection she had a few staples removed. She reports being placed on a course of antibiotics following this initial staple removal, but approximately 3 days ago began noticing progressively increasing drainage from the surgical site. She was ultimately prompted to visit the ED today after waking up in a puddle of drainage which she describes as \"grey and sewage-like.\" Associated symptoms include fever, chills, night sweats, surgical site pain, wound drainage, and malodorous wound. The patient denies vomiting or diarrhea. Her daughter has been packing the wound following the procedure with minimal alleviation. No exacerbating factors noted. The patient has a history of diabetes but does not regularly check her blood sugars. She has not received subsequent imaging of her abdomen since the hernia repair.     REVIEW OF " "SYSTEMS  Pertinent positives include post-operative complications, fever, chills, night sweats, surgical site pain, wound drainage, and malodorous wound. Pertinent negatives include no vomiting or diarrhea.  All other systems reviewed and negative.    PAST MEDICAL HISTORY   has a past medical history of Bell's palsy, Diabetes (HCC), Gout, and Shingles of eyelid.    SURGICAL HISTORY   has a past surgical history that includes ventral hernia repair (N/A, 9/8/2022).    SOCIAL HISTORY  Social History     Tobacco Use    Smoking status: Never    Smokeless tobacco: Never   Substance Use Topics    Alcohol use: Yes     Comment: twice a month    Drug use: Never      Social History     Substance and Sexual Activity   Drug Use Never       FAMILY HISTORY  No family history noted.    CURRENT MEDICATIONS  Home Medications       Reviewed by April Hernández (Pharmacy Tech) on 10/13/22 at 1015  Med List Status: Complete     Medication Last Dose Status   acetaminophen (TYLENOL) 500 MG Tab 10/12/2022 Active   acyclovir (ZOVIRAX) 400 MG tablet 10/12/2022 Active   glipiZIDE (GLUCOTROL) 5 MG Tab 10/12/2022 Active   ibuprofen (MOTRIN) 200 MG Tab 10/13/2022 Active   metformin (GLUCOPHAGE) 1000 MG tablet 10/12/2022 Active   sulfamethoxazole-trimethoprim (BACTRIM DS) 800-160 MG tablet 10/13/2022 Active                    ALLERGIES  Allergies   Allergen Reactions    Latex Rash     Rash      Pork Allergy Swelling     Joint pain        PHYSICAL EXAM  VITAL SIGNS: BP (!) 132/112   Pulse (!) 115   Temp 36.1 °C (97 °F) (Oral)   Resp 20   Ht 1.549 m (5' 1\")   Wt (!) 126 kg (277 lb 12.5 oz)   SpO2 97%   BMI 52.49 kg/m²   Constitutional: Alert in no apparent distress.  HENT: No signs of trauma, Bilateral external ears normal, Nose normal.   Eyes: Pupils are equal and reactive, Conjunctiva normal, Non-icteric.   Neck: No stridor.   Cardiovascular: Tachycardic, Regular rhythm, no murmurs.   Thorax & Lungs: Normal breath sounds, No respiratory " distress, No wheezing, No chest tenderness.   Abdomen: Bowel sounds normal, Soft, No tenderness, No masses, No peritoneal signs. There is a large midline vertical incision. The superior portion of the wound has an opening with brown, foul smelling feculent material which is expressed with mild pressure. The inferior portion of the wounds has a small open wound without active drainage.   Skin: Warm, Dry, No erythema, No rash.   Musculoskeletal:  No major deformities noted.   Neurologic: Alert, moving all extremities without difficulty, no focal deficits.    LABS  Labs Reviewed   CBC WITH DIFFERENTIAL - Abnormal; Notable for the following components:       Result Value    WBC 16.7 (*)     RBC 4.19 (*)     Hemoglobin 10.0 (*)     Hematocrit 32.1 (*)     MCV 76.6 (*)     MCH 23.9 (*)     MCHC 31.2 (*)     Platelet Count 483 (*)     Neutrophils-Polys 91.50 (*)     Lymphocytes 3.40 (*)     Immature Granulocytes 1.20 (*)     Neutrophils (Absolute) 15.26 (*)     Lymphs (Absolute) 0.57 (*)     Immature Granulocytes (abs) 0.20 (*)     All other components within normal limits   COMP METABOLIC PANEL - Abnormal; Notable for the following components:    Sodium 134 (*)     Potassium 3.5 (*)     Co2 18 (*)     Anion Gap 19.0 (*)     Glucose 191 (*)     AST(SGOT) 11 (*)     Globulin 4.6 (*)     All other components within normal limits   URINALYSIS - Abnormal; Notable for the following components:    Ketones 40 (*)     Occult Blood Trace (*)     All other components within normal limits    Narrative:     Indication for culture:->Emergency Room Patient   URINE MICROSCOPIC (W/UA) - Abnormal; Notable for the following components:    Bacteria Few (*)     Hyaline Cast 6-10 (*)     All other components within normal limits    Narrative:     Indication for culture:->Emergency Room Patient   LACTIC ACID   ESTIMATED GFR   URINE CULTURE(NEW)    Narrative:     Indication for culture:->Emergency Room Patient   BLOOD CULTURE    Narrative:      "Per Hospital Policy: Only change Specimen Src: to \"Line\" if  specified by physician order.   BLOOD CULTURE    Narrative:     Per Hospital Policy: Only change Specimen Src: to \"Line\" if  specified by physician order.     All labs reviewed by me.    RADIOLOGY  CT-ABDOMEN-PELVIS WITH   Final Result      1.  Edema/infiltrative fluid within the mesentery anteriorly deep to anterior abdominal wall incision without loculated fluid collection identified.      2.  Edema and phlegmonous change within the subcutaneous tissue with apparent small amount of fluid within the midline incision.      3.  Small foci of gas within the subcutaneous tissue and in the inflamed mesentery likely postoperative in nature as there is no evidence of contrast extravasation or intra-abdominal abscess to suggest bowel perforation.      4.  Hepatic steatosis.      5.  Nonspecific left adrenal gland nodule.        The radiologist's interpretation of all radiological studies have been reviewed by me.    COURSE & MEDICAL DECISION MAKING  Pertinent Labs & Imaging studies reviewed. (See chart for details)    Differential diagnoses include but are not limited to: sepsis, enterocutaneous fistula, or intraabdominal abscess.    9:39 AM - Patient seen and examined at bedside. Patient will be treated with NS Bolus Infusion 3780 mL, Metronidazole 500 mg IVPB, and Ceftriaxone 2 g. Ordered Blood Culture, Urine Culture (New), UA, CMP, CBC with diff, Lactic Acid, and CT-Abdomen-Pelvis With to evaluate her symptoms. Discussed utilizing labs and imaging to further evaluate the patient, she is amenable to the plan of care.     1:13 PM - Paged Surgery.     1:18 PM - I discussed the patient's case and the above findings with Dr. Grijalva (Surgery) who relayed that there is no need for surgical intervention at this time. They relayed that the patient needs an IR drain placed, nutrition instruction, and wound care follow up.    1:32 PM - Patient was reevaluated at bedside. " Discussed lab and radiology results with the patient. Also explained my consult with Dr. Grijalva. Will proceed with a plan for admission.     1:56 PM - I discussed the patient's case and the above findings with Dr. Castillo (Hospitalist) who will assess the patient for hospitalization.       HYDRATION: Based on the patient's presentation of Sepsis and Tachycardia the patient was given IV fluids. IV Hydration was used because oral hydration was not adequate alone. Upon recheck following hydration, the patient was improved.    CRITICAL CARE NOTE:  Critical care time was provided for 30 minutes minutes exclusive of separately billable procedures and treating other patients. This involved direct bedside patient care, speaking with family members, review of past medical records, reviewing the results of the laboratory and diagnostic studies, consulting with other physicians, as well as evaluating the effectiveness of the therapy instituted as described.      Decision Making:  This is a 57 y.o. year old female who presents with foul-smelling abdominal wall drainage.  She has a recent ventral wall hernia repair with bowel resection and on exam I was concerned for enteric cutaneous fistula.  She was also tachycardic she had an elevated white blood cell count and evidence of infection and therefore meets sepsis criteria.  She is hemodynamically stable she was given IV fluids.  She was given antibiotics.  I spoke with Dr. Grijalva, surgery who reviewed the imaging and there is a phlegmon on CT no drainable abscess.  She recommended antibiotics wound consult and medicine hospitalization at this time.  Patient is agreeable to this plan.  Dr. Castillo, hospitalist will assess the patient for hospitalization.    DISPOSITION:  Patient will be hospitalized by Dr. Castillo in guarded condition.      FINAL IMPRESSION  1. Enterocutaneous fistula    2. Sepsis without acute organ dysfunction, due to unspecified organism (HCC)    3. Wound infection          This dictation has been created using voice recognition software and/or scribes. The accuracy of the dictation is limited by the abilities of the software and the expertise of the scribes. I expect there may be some errors of grammar and possibly content. I made every attempt to manually correct the errors within my dictation. However, errors related to voice recognition software and/or scribes may still exist and should be interpreted within the appropriate context.     I, Itz John (Scribe), am scribing for, and in the presence of, Carmen Nicholas M.D..    Electronically signed by: Itz John (Scribe), 10/13/2022    I, Carmen Nicholas M.D. personally performed the services described in this documentation, as scribed by Itz John in my presence, and it is both accurate and complete.    The note accurately reflects work and decisions made by me.  Carmen Nicholas M.D.  10/13/2022  2:46 PM

## 2022-10-13 NOTE — ASSESSMENT & PLAN NOTE
Noted, patient has been working on weight loss at home   Lost 35 lbs prior to previous admission   Counseling when appropriate

## 2022-10-13 NOTE — ED NOTES
Pt and daughter made aware of POC  She is to be admitted  Hospitalist at  for evaluation and admission orders

## 2022-10-13 NOTE — H&P
"Surgical History and Physical    Date: 10/13/2022    Requesting Physician: Dr. Nicholas, MEREDITH  PCP: Swapna Ardon M.D.  Attending Physician: Katie Grijalva M.D. [facility]    CC: \"my wound is draining\"    HPI: This is a 57 y.o. female with PMH significant for morbid obesity and DM II who is s/p incarcerated ventral hernia repair on 9/8/22, which was complicated by PO wound infection. Unfortubatey today she woke up with fevers and chills, and increasing wound drainage that was grey and foul smelling.  She denies fevers, chills, nausea or vomiting or any other changes in her BMs. She does endorse incisional pain.    Upon exam she has a small opening left lateral to her abdominal incision that is draining brown material.    A CT was   Past Medical History:   Diagnosis Date    Bell's palsy     Diabetes (HCC)     Type II    Gout     Shingles of eyelid        Past Surgical History:   Procedure Laterality Date    VENTRAL HERNIA REPAIR N/A 9/8/2022    Procedure: OPEN INCARCERATED VENTRAL HERNIA REPAIR, SMALL BOWEL RESECTION;  Surgeon: Myranda Pepper M.D.;  Location: SURGERY [facility];  Service: General       No current facility-administered medications for this encounter.     Current Outpatient Medications   Medication Sig Dispense Refill    glipiZIDE (GLUCOTROL) 5 MG Tab Take 5 mg by mouth every day.      ibuprofen (MOTRIN) 200 MG Tab Take 800 mg by mouth every 8 hours as needed. Indications: Pain      acetaminophen (TYLENOL) 500 MG Tab Take 500-1,000 mg by mouth every 6 hours as needed. Indications: Pain      sulfamethoxazole-trimethoprim (BACTRIM DS) 800-160 MG tablet Take 1 Tablet by mouth 2 times a day. 10 day course      acyclovir (ZOVIRAX) 400 MG tablet Take 400 mg by mouth 2 times a day.      metformin (GLUCOPHAGE) 1000 MG tablet Take 1,000 mg by mouth 2 times a day, with meals.         Social History     Socioeconomic History    Marital status: Single     Spouse name: Not on file    Number of " children: Not on file    Years of education: Not on file    Highest education level: Not on file   Occupational History    Not on file   Tobacco Use    Smoking status: Never    Smokeless tobacco: Never   Vaping Use    Vaping Use: Not on file   Substance and Sexual Activity    Alcohol use: Yes     Comment: twice a month    Drug use: Never    Sexual activity: Not on file   Other Topics Concern    Not on file   Social History Narrative    Not on file     Social Determinants of Health     Financial Resource Strain: Not on file   Food Insecurity: Not on file   Transportation Needs: Not on file   Physical Activity: Not on file   Stress: Not on file   Social Connections: Not on file   Intimate Partner Violence: Not on file   Housing Stability: Not on file       No family history on file.    Allergies:  Latex and Pork allergy    Review of Systems:  Constitutional: Negative for fever, chills, weight loss, malaise/fatigue and diaphoresis.   HENT: Negative for hearing loss, ear pain, nosebleeds, congestion, sore throat, neck pain, tinnitus and ear discharge.    Eyes: Negative for blurred vision, double vision, photophobia, pain, discharge and redness.   Respiratory: Negative for cough, hemoptysis, sputum production, shortness of breath, wheezing and stridor.    Cardiovascular: Negative for chest pain, palpitations, orthopnea, claudication, leg swelling and PND.   Gastrointestinal: Negative for heartburn, nausea, vomiting, +abdominal pain, diarrhea, constipation, blood in stool and melena.   Genitourinary: Negative for dysuria, urgency, frequency, hematuria and flank pain.   Musculoskeletal: Negative for myalgias, back pain, joint pain and falls.   Skin: Negative for itching and rash.  Neurological: Negative for dizziness, tingling, tremors, sensory change, speech change, focal weakness, seizures, loss of consciousness, weakness and headaches.   Endo/Heme/Allergies: Negative for environmental allergies and polydipsia. Does not  "bruise/bleed easily.   Psychiatric/Behavioral: Negative for depression, suicidal ideas, hallucinations, memory loss and substance abuse. The patient is not nervous/anxious and does not have insomnia.    Physical Exam:  /69   Pulse 93   Temp 36.1 °C (97 °F) (Oral)   Resp (!) 25   Ht 1.549 m (5' 1\")   Wt (!) 126 kg (277 lb 12.5 oz)   SpO2 97%     Constitutional: she is oriented to person, place, and time.  she appears well-developed and well-nourished. No distress.   Head: Normocephalic and atraumatic.   Neck: Normal range of motion. Neck supple. No JVD present. No tracheal deviation present. No thyromegaly present.   Cardiovascular: Normal rate, regular rhythm, normal heart sounds and intact distal pulses.  Exam reveals no gallop and no friction rub.  No murmur heard.  Pulmonary/Chest: Effort normal and breath sounds normal. No stridor. No respiratory distress. she has no wheezes. she has no rales.   Abdominal: Obese, soft. Incision well approximated, lateral to the incision is a 2 cm opening draining dark brown succus appearing material.  Musculoskeletal: Normal range of motion. she exhibits no edema and no tenderness.   Neurological: she is alert and oriented to person, place, and time. she has normal reflexes. No cranial nerve deficit. Coordination normal.   Skin: Skin is warm and dry. No rash noted. she is not diaphoretic. No erythema. No pallor.   Psychiatric: she has a normal mood and affect.  Behavior is normal.       Labs:  Recent Labs     10/13/22  1035   WBC 16.7*   RBC 4.19*   HEMOGLOBIN 10.0*   HEMATOCRIT 32.1*   MCV 76.6*   MCH 23.9*   MCHC 31.2*   RDW 48.5   PLATELETCT 483*   MPV 9.3     Recent Labs     10/13/22  1035   SODIUM 134*   POTASSIUM 3.5*   CHLORIDE 97   CO2 18*   GLUCOSE 191*   BUN 13   CREATININE 0.75   CALCIUM 9.4         Recent Labs     10/13/22  1035   ASTSGOT 11*   ALTSGPT 9   TBILIRUBIN 0.4   ALKPHOSPHAT 78   GLOBULIN 4.6*       Radiology:  10/13/2022 12:53 PM   "   HISTORY/REASON FOR EXAM:  Recent surgery September 8 for incarcerated ventral hernia. Drainage of fluid from open abdominal wound.        TECHNIQUE/EXAM DESCRIPTION:   CT scan of the abdomen and pelvis with contrast.     Contrast-enhanced helical scanning was obtained from the diaphragmatic domes through the pubic symphysis following the bolus administration of nonionic contrast without complication.     100 mL of Omnipaque 350 nonionic contrast was administered without complication.     Low dose optimization technique was utilized for this CT exam including automated exposure control and adjustment of the mA and/or kV according to patient size.     COMPARISON: September 5, 2022     FINDINGS:  Lower Chest: Unremarkable.     Liver: There is diffuse low-attenuation in the liver consistent with hepatic steatosis..     Spleen: Unremarkable.     Pancreas: Unremarkable.     Gallbladder: No calcified stones.     Biliary: Nondilated.     Adrenal glands: There is a stable nonspecific left adrenal gland nodule.     Kidneys: Unremarkable without hydronephrosis.     Bowel: No obstruction or acute inflammation. Contrast extends to the rectum. No contrast extravasation identified. Normal-appearing appendix identified     Lymph nodes: No adenopathy.     Vasculature: There is calcified plaque in the aorta without aneurysm.     Peritoneum: There is stranding in the mesentery of the anterior abdomen deep to the abdominal incision with gas seen in the subcutaneous tissue as well as a small amount of gas within the mesentery. There is infiltrative fluid within the mesentery though   no loculated formed fluid collection identified. Similar findings noted in the subcutaneous tissue with phlegmonous change and edema without loculated fluid collection. There is some fluid tracking within the incision.     Musculoskeletal: No acute or destructive process.     Pelvis: No adenopathy or free fluid.           IMPRESSION:     1.   Edema/infiltrative fluid within the mesentery anteriorly deep to anterior abdominal wall incision without loculated fluid collection identified.     2.  Edema and phlegmonous change within the subcutaneous tissue with apparent small amount of fluid within the midline incision.     3.  Small foci of gas within the subcutaneous tissue and in the inflamed mesentery likely postoperative in nature as there is no evidence of contrast extravasation or intra-abdominal abscess to suggest bowel perforation.     4.  Hepatic steatosis.     5.  Nonspecific left adrenal gland nodule.    The radiologist's interpretation of all images has been reviewed by me.     Assessment: This is a 57 y.o. female with a clinical enterocutaneous fistula.    Plan: Clinically apparent enterocutaneous fistula. Not visualized well on CT as far as being able to visualize contrast extravasation but my suspicion is high given her clinical appearance. I recommend expectant management including bowel rest, parenteral nutrition support, diabetic management with the hope that this is a low volume fistula and will spontaneously close. Recommend wound care consultation to assist in local wound care to protect the surrounding skin. Agree with antibiotic administration given her clinical appearance and CT appearance of a phlegmon and surrounding tissue infection. She is not currently septic and there is no indication for emergent surgical intervention that would put her at risk for more post-surgical complications as the tissues are surely very friable and scarred and socked in given her close proximity to her prior surgery.    This was all discussed with the patient who understands and appreciates my explaining the situation and my involvement in her care.     Thank you very much for this consultation. We will follow along with you.     Katie Grijalva M.D.  [facility]  [phone]

## 2022-10-13 NOTE — H&P
Hospital Medicine History & Physical Note    Date of Service  10/13/2022    Primary Care Physician  Swapna Ardon M.D.    Consultants  general surgery    Specialist Names: Dr. Grijalva     Code Status  Full Code    Chief Complaint  Chief Complaint   Patient presents with    Post-Op Complications     Wound to abd draining large amount of grey liquid, opened this am, pt reports night sweats x 3 nights       History of Presenting Illness  Jordyn Rick is a 57 y.o. female who presented 10/13/2022 with postop complication.  Patient was recently discharged 9/12/2022 after she was seen for an incarcerated ventral hernia with bowel obstruction had hernia repair and small bowel resection performed on 9/8.  She reports after surgery she was on a course of antibiotics and had her surgical staples removed.  3 days ago patient reports she was having chills and woke up and sweats with abdominal pain and nausea.  This morning she woke up with drainage out of her abdominal wound that was gray and smelled terrible.  Due to the new drainage she presented to the ER.  Patient denies chest pain, shortness of breath, diarrhea, or vomiting.    In the ER patient's vital signs significant for tachycardia and tachypnea.  Labs significant for WBC 16.7, hemoglobin 10, platelets 483, sodium 134, potassium 3.5, CO2 18, anion gap 19 and normal lactic acid.  CT abdomen pelvis showed edema/infiltrative fluid with the mesentery anterior deep to the anterior abdominal wall incision without loculated fluid collection, edema and phlegmonous change within the subcutaneous tissue with apparent small amount of fluid within the midline incision.  General surgery consulted by ERP.    I discussed the plan of care with patient and family.    Review of Systems  Review of Systems   Constitutional:  Positive for chills, fever and malaise/fatigue.   HENT:  Negative for congestion.    Eyes:  Negative for blurred vision.   Respiratory:  Negative for cough and  shortness of breath.    Cardiovascular:  Negative for chest pain and leg swelling.   Gastrointestinal:  Positive for abdominal pain and nausea. Negative for constipation, diarrhea and vomiting.   Genitourinary:  Negative for dysuria.   Musculoskeletal:  Negative for myalgias.   Skin:  Negative for rash.   Neurological:  Negative for dizziness and headaches.   Psychiatric/Behavioral:  Negative for depression.    All other systems reviewed and are negative.    Past Medical History   has a past medical history of Bell's palsy, Diabetes (HCC), Gout, and Shingles of eyelid.    Surgical History   has a past surgical history that includes ventral hernia repair (N/A, 9/8/2022).     Family History  FHx of early CAD   Family history reviewed with patient. There is no family history that is pertinent to the chief complaint.     Social History   reports that she has never smoked. She has never used smokeless tobacco. She reports current alcohol use. She reports that she does not use drugs.    Allergies  Allergies   Allergen Reactions    Latex Rash     Rash      Pork Allergy Myalgia     Pork food products causes her to have gout flares and joint pain        Medications  Prior to Admission Medications   Prescriptions Last Dose Informant Patient Reported? Taking?   acetaminophen (TYLENOL) 500 MG Tab 10/12/2022 at PM Patient Yes Yes   Sig: Take 500-1,000 mg by mouth every 6 hours as needed. Indications: Pain   acyclovir (ZOVIRAX) 400 MG tablet 10/12/2022 at AM Patient Yes No   Sig: Take 400 mg by mouth 2 times a day.   glipiZIDE (GLUCOTROL) 5 MG Tab 10/12/2022 at PM Patient Yes Yes   Sig: Take 5 mg by mouth every day.   ibuprofen (MOTRIN) 200 MG Tab 10/13/2022 at 0100 Patient Yes Yes   Sig: Take 800 mg by mouth every 8 hours as needed. Indications: Pain   metformin (GLUCOPHAGE) 1000 MG tablet 10/12/2022 at PM Patient Yes No   Sig: Take 1,000 mg by mouth 2 times a day, with meals.   sulfamethoxazole-trimethoprim (BACTRIM DS) 800-160  MG tablet 10/13/2022 at 0100 Patient Yes Yes   Sig: Take 1 Tablet by mouth 2 times a day. 10 day course      Facility-Administered Medications: None       Physical Exam  Temp:  [36.1 °C (97 °F)] 36.1 °C (97 °F)  Pulse:  [] 95  Resp:  [13-25] 13  BP: (109-132)/() 112/72  SpO2:  [96 %-99 %] 96 %  Blood Pressure: 115/69   Temperature: 36.1 °C (97 °F)   Pulse: 93   Respiration: (!) 25   Pulse Oximetry: 97 %       Physical Exam  Vitals and nursing note reviewed.   Constitutional:       General: She is not in acute distress.     Appearance: She is morbidly obese. She is ill-appearing.      Comments: Daughter at bedside    HENT:      Head: Normocephalic and atraumatic.      Right Ear: External ear normal.      Left Ear: External ear normal.      Nose: Nose normal.      Mouth/Throat:      Mouth: Mucous membranes are dry.      Pharynx: Oropharynx is clear.   Eyes:      Extraocular Movements: Extraocular movements intact.      Conjunctiva/sclera: Conjunctivae normal.      Pupils: Pupils are equal, round, and reactive to light.   Cardiovascular:      Rate and Rhythm: Normal rate and regular rhythm.      Pulses: Normal pulses.      Heart sounds: Normal heart sounds.   Pulmonary:      Effort: Pulmonary effort is normal. No respiratory distress.      Breath sounds: Normal breath sounds. No wheezing or rales.      Comments: Difficult to auscultate due to body habitus  Abdominal:      General: Abdomen is flat. There is no distension.      Palpations: There is mass.      Tenderness: There is abdominal tenderness.      Comments: Wound open in mid abdomen, when palpated foul smelling fluid and feculent material expressed. Bandage over soaked through    Musculoskeletal:         General: Normal range of motion.      Cervical back: Normal range of motion and neck supple.      Right lower leg: No edema.      Left lower leg: No edema.   Skin:     General: Skin is warm and dry.   Neurological:      General: No focal deficit  present.      Mental Status: She is alert and oriented to person, place, and time.      Cranial Nerves: No cranial nerve deficit.      Motor: No weakness.   Psychiatric:         Mood and Affect: Mood normal.         Behavior: Behavior normal.       Laboratory:  Recent Labs     10/13/22  1035   WBC 16.7*   RBC 4.19*   HEMOGLOBIN 10.0*   HEMATOCRIT 32.1*   MCV 76.6*   MCH 23.9*   MCHC 31.2*   RDW 48.5   PLATELETCT 483*   MPV 9.3     Recent Labs     10/13/22  1035   SODIUM 134*   POTASSIUM 3.5*   CHLORIDE 97   CO2 18*   GLUCOSE 191*   BUN 13   CREATININE 0.75   CALCIUM 9.4     Recent Labs     10/13/22  1035   ALTSGPT 9   ASTSGOT 11*   ALKPHOSPHAT 78   TBILIRUBIN 0.4   GLUCOSE 191*         No results for input(s): NTPROBNP in the last 72 hours.      No results for input(s): TROPONINT in the last 72 hours.    Imaging:  CT-ABDOMEN-PELVIS WITH   Final Result      1.  Edema/infiltrative fluid within the mesentery anteriorly deep to anterior abdominal wall incision without loculated fluid collection identified.      2.  Edema and phlegmonous change within the subcutaneous tissue with apparent small amount of fluid within the midline incision.      3.  Small foci of gas within the subcutaneous tissue and in the inflamed mesentery likely postoperative in nature as there is no evidence of contrast extravasation or intra-abdominal abscess to suggest bowel perforation.      4.  Hepatic steatosis.      5.  Nonspecific left adrenal gland nodule.          no X-Ray or EKG requiring interpretation    Assessment/Plan:  Justification for Admission Status  I anticipate this patient will require at least two midnights for appropriate medical management, necessitating inpatient admission because enterocutaneous fistula with sepsis.  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  -General Surgery consulted, NPO, likely will need TPN   -IV abx, cultures pending   -wound care   -pain control     Sepsis (HCC)- (present on admission)  Assessment & Plan  This is Sepsis Present on admission  SIRS criteria identified on my evaluation include: Tachycardia, with heart rate greater than 90 BPM, Tachypnea, with respirations greater than 20 per minute and Leukocytosis, with WBC greater than 12,000  Source is abdominal   Sepsis protocol initiated  Fluid resuscitation ordered per protocol  Crystalloid Fluid Administration: Fluid resuscitation ordered per standard protocol - 30 mL/kg per current or ideal body weight  IV antibiotics as appropriate for source of sepsis  Reassessment: I have reassessed the patient's hemodynamic status    Rocephin and flagyl   Cultures pending   Surgery consulted, no abscess on CT       Hypokalemia- (present on admission)  Assessment & Plan  IV Replace as needed   Check mg level    Morbid obesity with BMI of 50.0-59.9, adult ([facility])- (present on admission)  Assessment & Plan  Noted, patient has been working on weight loss at home   Lost 35 lbs prior to previous admission   Counseling when appropriate    Primary hypertension- (present on admission)  Assessment & Plan  PRN IV antihypertensives    DM (diabetes mellitus) ([facility])- (present on admission)  Assessment & Plan  ISS with hypoglycemic protocol        VTE prophylaxis: enoxaparin ppx

## 2022-10-13 NOTE — ED TRIAGE NOTES
Pt to triage in wheelchair with daughter  Chief Complaint   Patient presents with    Post-Op Complications     Wound to abd draining large amount of grey liquid, opened this am, pt reports night sweats x 3 nights     Hernia repair 9/8    Pt A & 0 x 4, speech clear    Pt updated on triage process and asked to inform RN of any changes while waiting in lobby.

## 2022-10-13 NOTE — ASSESSMENT & PLAN NOTE
ISS with hypoglycemic protocol  A1c 6.9, controlled  Glucose levels lower 90 this morning from 191.  Hold all PO home meds.

## 2022-10-13 NOTE — ED NOTES
Med rec completed per pt and home pharmacy ([facility])   Allergies reviewed    Pt started a 10 day course of Bactrim DS on 10/6/2022

## 2022-10-13 NOTE — ASSESSMENT & PLAN NOTE
On exam open surgical site with fecal material expressed   -CT abdomen pelvis showed edema/infiltrative fluid with the mesentery anterior deep to the anterior abdominal wall incision without loculated fluid collection, edema and phlegmonous change within the subcutaneous tissue with apparent small amount of fluid within the midline incision.    WBC normalized  -General Surgery following.  recommending GI soft diet.  They evaluated the would and feel it is an abscess-cutaneous fistula and not enterocutaneous fistula.  -Change to PO augmentin  -wound care (needs OP clinic and HH, pending insurance auth prior to DC)

## 2022-10-14 PROBLEM — Z02.9 DISCHARGE PLANNING ISSUES: Status: ACTIVE | Noted: 2022-10-14

## 2022-10-14 PROBLEM — Z75.8 DISCHARGE PLANNING ISSUES: Status: ACTIVE | Noted: 2022-10-14

## 2022-10-14 PROBLEM — E83.42 HYPOMAGNESEMIA: Status: ACTIVE | Noted: 2022-10-14

## 2022-10-14 LAB
ALBUMIN SERPL BCP-MCNC: 2.9 G/DL (ref 3.2–4.9)
ALBUMIN/GLOB SERPL: 0.6 G/DL
ALP SERPL-CCNC: 82 U/L (ref 30–99)
ALT SERPL-CCNC: 10 U/L (ref 2–50)
ANION GAP SERPL CALC-SCNC: 14 MMOL/L (ref 7–16)
AST SERPL-CCNC: 15 U/L (ref 12–45)
BILIRUB SERPL-MCNC: 0.2 MG/DL (ref 0.1–1.5)
BUN SERPL-MCNC: 6 MG/DL (ref 8–22)
CALCIUM SERPL-MCNC: 8.8 MG/DL (ref 8.4–10.2)
CHLORIDE SERPL-SCNC: 100 MMOL/L (ref 96–112)
CO2 SERPL-SCNC: 19 MMOL/L (ref 20–33)
CREAT SERPL-MCNC: 0.46 MG/DL (ref 0.5–1.4)
ERYTHROCYTE [DISTWIDTH] IN BLOOD BY AUTOMATED COUNT: 49.3 FL (ref 35.9–50)
EST. AVERAGE GLUCOSE BLD GHB EST-MCNC: 151 MG/DL
GFR SERPLBLD CREATININE-BSD FMLA CKD-EPI: 112 ML/MIN/1.73 M 2
GLOBULIN SER CALC-MCNC: 4.5 G/DL (ref 1.9–3.5)
GLUCOSE BLD STRIP.AUTO-MCNC: 104 MG/DL (ref 65–99)
GLUCOSE BLD STRIP.AUTO-MCNC: 137 MG/DL (ref 65–99)
GLUCOSE BLD STRIP.AUTO-MCNC: 98 MG/DL (ref 65–99)
GLUCOSE SERPL-MCNC: 90 MG/DL (ref 65–99)
HBA1C MFR BLD: 6.9 % (ref 4–5.6)
HCT VFR BLD AUTO: 30.2 % (ref 37–47)
HGB BLD-MCNC: 9.4 G/DL (ref 12–16)
MAGNESIUM SERPL-MCNC: 1.7 MG/DL (ref 1.5–2.5)
MCH RBC QN AUTO: 24 PG (ref 27–33)
MCHC RBC AUTO-ENTMCNC: 31.1 G/DL (ref 33.6–35)
MCV RBC AUTO: 77 FL (ref 81.4–97.8)
PHOSPHATE SERPL-MCNC: 4.4 MG/DL (ref 2.5–4.5)
PLATELET # BLD AUTO: 466 K/UL (ref 164–446)
PMV BLD AUTO: 9 FL (ref 9–12.9)
POTASSIUM SERPL-SCNC: 3.6 MMOL/L (ref 3.6–5.5)
PROT SERPL-MCNC: 7.4 G/DL (ref 6–8.2)
RBC # BLD AUTO: 3.92 M/UL (ref 4.2–5.4)
SODIUM SERPL-SCNC: 133 MMOL/L (ref 135–145)
WBC # BLD AUTO: 10.3 K/UL (ref 4.8–10.8)

## 2022-10-14 PROCEDURE — 80053 COMPREHEN METABOLIC PANEL: CPT

## 2022-10-14 PROCEDURE — 36415 COLL VENOUS BLD VENIPUNCTURE: CPT

## 2022-10-14 PROCEDURE — 770006 HCHG ROOM/CARE - MED/SURG/GYN SEMI*

## 2022-10-14 PROCEDURE — 99233 SBSQ HOSP IP/OBS HIGH 50: CPT | Performed by: INTERNAL MEDICINE

## 2022-10-14 PROCEDURE — 700101 HCHG RX REV CODE 250: Performed by: INTERNAL MEDICINE

## 2022-10-14 PROCEDURE — 700111 HCHG RX REV CODE 636 W/ 250 OVERRIDE (IP): Performed by: INTERNAL MEDICINE

## 2022-10-14 PROCEDURE — 83036 HEMOGLOBIN GLYCOSYLATED A1C: CPT

## 2022-10-14 PROCEDURE — 83735 ASSAY OF MAGNESIUM: CPT

## 2022-10-14 PROCEDURE — 302098 PASTE RING (FLAT): Performed by: INTERNAL MEDICINE

## 2022-10-14 PROCEDURE — 82962 GLUCOSE BLOOD TEST: CPT

## 2022-10-14 PROCEDURE — 700111 HCHG RX REV CODE 636 W/ 250 OVERRIDE (IP): Performed by: HOSPITALIST

## 2022-10-14 PROCEDURE — 94760 N-INVAS EAR/PLS OXIMETRY 1: CPT

## 2022-10-14 PROCEDURE — 85027 COMPLETE CBC AUTOMATED: CPT

## 2022-10-14 PROCEDURE — 97165 OT EVAL LOW COMPLEX 30 MIN: CPT

## 2022-10-14 PROCEDURE — 84100 ASSAY OF PHOSPHORUS: CPT

## 2022-10-14 PROCEDURE — 97161 PT EVAL LOW COMPLEX 20 MIN: CPT

## 2022-10-14 PROCEDURE — 700105 HCHG RX REV CODE 258: Performed by: INTERNAL MEDICINE

## 2022-10-14 PROCEDURE — 97602 WOUND(S) CARE NON-SELECTIVE: CPT

## 2022-10-14 RX ORDER — MAGNESIUM SULFATE HEPTAHYDRATE 40 MG/ML
2 INJECTION, SOLUTION INTRAVENOUS ONCE
Status: COMPLETED | OUTPATIENT
Start: 2022-10-14 | End: 2022-10-14

## 2022-10-14 RX ORDER — MORPHINE SULFATE 4 MG/ML
4 INJECTION INTRAVENOUS EVERY 4 HOURS PRN
Status: DISCONTINUED | OUTPATIENT
Start: 2022-10-14 | End: 2022-10-18

## 2022-10-14 RX ORDER — DIPHENHYDRAMINE HYDROCHLORIDE 50 MG/ML
12.5 INJECTION INTRAMUSCULAR; INTRAVENOUS
Status: COMPLETED | OUTPATIENT
Start: 2022-10-14 | End: 2022-10-14

## 2022-10-14 RX ORDER — MORPHINE SULFATE 4 MG/ML
1 INJECTION INTRAVENOUS EVERY 4 HOURS PRN
Status: DISCONTINUED | OUTPATIENT
Start: 2022-10-14 | End: 2022-10-18

## 2022-10-14 RX ORDER — MORPHINE SULFATE 4 MG/ML
2 INJECTION INTRAVENOUS EVERY 4 HOURS PRN
Status: DISCONTINUED | OUTPATIENT
Start: 2022-10-14 | End: 2022-10-18

## 2022-10-14 RX ORDER — SODIUM HYPOCHLORITE 1.25 MG/ML
SOLUTION TOPICAL 2 TIMES DAILY
Status: DISCONTINUED | OUTPATIENT
Start: 2022-10-15 | End: 2022-10-19 | Stop reason: ALTCHOICE

## 2022-10-14 RX ADMIN — MORPHINE SULFATE 4 MG: 4 INJECTION INTRAVENOUS at 12:12

## 2022-10-14 RX ADMIN — DIPHENHYDRAMINE HYDROCHLORIDE 12.5 MG: 50 INJECTION INTRAMUSCULAR; INTRAVENOUS at 21:58

## 2022-10-14 RX ADMIN — SODIUM CHLORIDE: 9 INJECTION, SOLUTION INTRAVENOUS at 10:22

## 2022-10-14 RX ADMIN — MAGNESIUM SULFATE 2 G: 2 INJECTION INTRAVENOUS at 07:50

## 2022-10-14 RX ADMIN — MORPHINE SULFATE 4 MG: 4 INJECTION INTRAVENOUS at 20:30

## 2022-10-14 RX ADMIN — MORPHINE SULFATE 4 MG: 4 INJECTION INTRAVENOUS at 16:28

## 2022-10-14 RX ADMIN — METRONIDAZOLE 500 MG: 5 INJECTION, SOLUTION INTRAVENOUS at 21:58

## 2022-10-14 RX ADMIN — CEFTRIAXONE SODIUM 2 G: 2 INJECTION, POWDER, FOR SOLUTION INTRAMUSCULAR; INTRAVENOUS at 12:16

## 2022-10-14 RX ADMIN — MORPHINE SULFATE 4 MG: 4 INJECTION INTRAVENOUS at 01:03

## 2022-10-14 RX ADMIN — MORPHINE SULFATE 2 MG: 4 INJECTION INTRAVENOUS at 07:49

## 2022-10-14 RX ADMIN — METRONIDAZOLE 500 MG: 5 INJECTION, SOLUTION INTRAVENOUS at 13:18

## 2022-10-14 RX ADMIN — MORPHINE SULFATE 4 MG: 4 INJECTION INTRAVENOUS at 04:36

## 2022-10-14 RX ADMIN — METRONIDAZOLE 500 MG: 5 INJECTION, SOLUTION INTRAVENOUS at 05:13

## 2022-10-14 RX ADMIN — ENOXAPARIN SODIUM 60 MG: 60 INJECTION SUBCUTANEOUS at 18:15

## 2022-10-14 RX ADMIN — ENOXAPARIN SODIUM 60 MG: 60 INJECTION SUBCUTANEOUS at 05:13

## 2022-10-14 ASSESSMENT — COGNITIVE AND FUNCTIONAL STATUS - GENERAL
SUGGESTED CMS G CODE MODIFIER MOBILITY: CH
SUGGESTED CMS G CODE MODIFIER DAILY ACTIVITY: CI
HELP NEEDED FOR BATHING: A LITTLE
MOBILITY SCORE: 24
DAILY ACTIVITIY SCORE: 23

## 2022-10-14 ASSESSMENT — PAIN DESCRIPTION - PAIN TYPE
TYPE: ACUTE PAIN

## 2022-10-14 ASSESSMENT — GAIT ASSESSMENTS
DISTANCE (FEET): 300
DISTANCE (FEET): 100
GAIT LEVEL OF ASSIST: SUPERVISED
DEVIATION: STEP TO

## 2022-10-14 ASSESSMENT — ENCOUNTER SYMPTOMS
FEVER: 0
HEADACHES: 0
DIZZINESS: 0
PALPITATIONS: 0
NAUSEA: 0
BACK PAIN: 0
SHORTNESS OF BREATH: 0
COUGH: 0
CHILLS: 0
DIARRHEA: 0
CONSTIPATION: 0
VOMITING: 0
ABDOMINAL PAIN: 1

## 2022-10-14 ASSESSMENT — ACTIVITIES OF DAILY LIVING (ADL): TOILETING: INDEPENDENT

## 2022-10-14 NOTE — ASSESSMENT & PLAN NOTE
Pending insurance approval for home health and outpatient wound care  Per general surgery she will not need a wound device

## 2022-10-14 NOTE — PROGRESS NOTES
4 Eyes Skin Assessment Completed by MITRA Lassiter and MITRA Marie.    Head WDL  Ears WDL  Nose WDL  Mouth WDL  Neck WDL  Breast/Chest WDL  Shoulder Blades WDL  Spine WDL  (R) Arm/Elbow/Hand WDL  (L) Arm/Elbow/Hand WDL  Abdomen Redness and Incision  Groin WDL  Scrotum/Coccyx/Buttocks Scab  (R) Leg WDL  (L) Leg WDL  (R) Heel/Foot/Toe WDL  (L) Heel/Foot/Toe WDL          Devices In Places Pulse Ox      Interventions In Place Pillows and Barrier Cream    Possible Skin Injury Yes    Pictures Uploaded Into Epic Yes  Wound Consult Placed Yes  RN Wound Prevention Protocol Ordered Yes

## 2022-10-14 NOTE — THERAPY
Occupational Therapy   Initial Evaluation     Patient Name: Jordyn Rikc  Age:  57 y.o., Sex:  female  Medical Record #: [id number]  Today's Date: 10/14/2022          Assessment  Patient is 57 y.o. female with a diagnosis of enterocutane fistula. S/p repair of strangulated ventral hernia 9/8/22. Pt is A&Ox4. Dressing to abd in place; wound care to visit this pm per nrsg. Pt performs ADL transfers,ambulation in room/halls, toileting,grooming with Sup/Mod Indep; see below for details. Lives alone; family available to assist when needed. Pt is near baseline level; no further acute OT needs.           Plan  Recommend Occupational Therapy for Evaluation only  .    DC Equipment Recommendations: (P) None  Discharge Recommendations: (P) Anticipate that the patient will have no further occupational therapy needs after discharge from the hospital     Subjective  Pleasant and cooperative     Objective     10/14/22 1010   Prior Living Situation   Prior Services None   Housing / Facility 1 Story Apartment / Condo   Bathroom Set up Walk In Shower;Shower Chair   Equipment Owned Tub / Shower Seat   Lives with - Patient's Self Care Capacity Alone and Able to Care For Self   Prior Level of ADL Function   Self Feeding Independent   Grooming / Hygiene Independent   Bathing Independent   Dressing Independent   Toileting Independent   Prior Level of IADL Function   Medication Management Independent   Laundry Independent   Kitchen Mobility Independent   Finances Independent   Home Management Independent   Shopping Independent   Prior Level Of Mobility Independent Without Device in Home   Driving / Transportation Driving Independent   Occupation (Pre-Hospital Vocational) Unable To Determine At This Time   Leisure Interests Unable To Determine At This Time   History of Falls   History of Falls No   Vitals   Pulse 88   Respiration 18   Pulse Oximetry 96 %   O2 (LPM) 0   O2 Delivery Device None - Room Air   Cognition    Cognition / Consciousness  WDL   Passive ROM Upper Body   Passive ROM Upper Body WDL   Active ROM Upper Body   Active ROM Upper Body  WDL   Strength Upper Body   Upper Body Strength  WDL   Sensation Upper Body   Upper Extremity Sensation  WDL   Upper Body Muscle Tone   Upper Body Muscle Tone  WDL   Neurological Concerns   Neurological Concerns No   Coordination Upper Body   Coordination WDL   Balance Assessment   Sitting Balance (Static) Good   Sitting Balance (Dynamic) Good   Standing Balance (Static) Good   Standing Balance (Dynamic) Good   Weight Shift Sitting Good   Weight Shift Standing Good   Bed Mobility    Supine to Sit Modified Independent   Sit to Supine Modified Independent   Scooting Modified Independent   ADL Assessment   Grooming Modified Independent;Standing   Upper Body Dressing Independent   Lower Body Dressing Supervision   Toileting Supervision   How much help from another person does the patient currently need...   Putting on and taking off regular lower body clothing? 4   Bathing (including washing, rinsing, and drying)? 3   Toileting, which includes using a toilet, bedpan, or urinal? 4   Putting on and taking off regular upper body clothing? 4   Taking care of personal grooming such as brushing teeth? 4   Eating meals? 4   6 Clicks Daily Activity Score 23   Functional Mobility   Sit to Stand Supervised   Bed, Chair, Wheelchair Transfer Supervised   Toilet Transfers Supervised   Transfer Method Stand Step   Distance (Feet) 100   # of Times Distance was Traveled 2   Visual Perception   Visual Perception  WDL   Activity Tolerance   Sitting Edge of Bed 11   Standing 12   Education Group   Role of Occupational Therapist Patient Response Patient;Acceptance;Explanation;Verbal Demonstration   Anticipated Discharge Equipment and Recommendations   DC Equipment Recommendations None   Discharge Recommendations Anticipate that the patient will have no further occupational therapy needs after discharge from the hospital    Interdisciplinary Plan of Care Collaboration   IDT Collaboration with  Nursing   Patient Position at End of Therapy In Bed;Call Light within Reach;Tray Table within Reach;Phone within Reach   Collaboration Comments Results of eval

## 2022-10-14 NOTE — PROGRESS NOTES
Received report from night RN, assumed care. POC discussed.   A&Ox4  Pain 6/10 reported.  Bed in lowest position with call light and belongings within reach.

## 2022-10-14 NOTE — DIETARY
NUTRITION SERVICES:     Pt admitted w/ enterocutaneous fistula at site of ventral hernia repair (surgery 5 weeks ago). Wound team consult pending. NPO on bowel rest, TPN under consideration. Per surgery, non-surgical management at this time. Admit screen negative for poor PO intake or unplanned wt loss.    BMI - Pt with BMI >40 (=Body mass index is 52.49 kg/m².), Class III obesity. Weight loss counseling not appropriate in acute care setting.    PLAN/RECOMMEND   If appropriate at DC please refer to outpatient nutrition services for weight management.    RD following for nutrition POC.

## 2022-10-14 NOTE — HOSPITAL COURSE
57-year-old female with a past medical history of T2DM, gout, Bell's palsy admitted on 10/13/22 for worsening abdominal pain onset 3 days, complaining of purulent drainage from her abdominal wound, foul-smelling, associated chills, night sweats, abdominal pain and nausea.  Patient had a recent incarcerated ventral hernia repair with small bowel resection on 9/8/2022, was discharged from hospital on 9/12/2022.  Patient was recommended for admission by general surgery, recommending bowel rest, IV antibiotics, possible TPN, recommending conservative management in hopes enterocutaneous fistula spontaneously closes.  Patient was started on IV ceftriaxone and IV Flagyl.  She has been kept NPO.

## 2022-10-14 NOTE — CARE PLAN
The patient is Stable - Low risk of patient condition declining or worsening    Shift Goals  Clinical Goals: Rest and sleep at least 4 hours, Pain Control 3/10, no falls or injuries  Patient Goals: Rest and sleep at least 4 hours, Pain Control 3/10, no falls or injuries    Progress made toward(s) clinical / shift goals: No falls or injuries, slept at least over 4 hours, pain controlled 3/10    Patient is not progressing towards the following goals:

## 2022-10-14 NOTE — PROGRESS NOTES
[facility] Medicine Daily Progress Note    Date of Service  10/14/2022    Chief Complaint  Jordyn Rick is a 57 y.o. female admitted 10/13/2022 with   Chief Complaint   Patient presents with    Post-Op Complications     Wound to abd draining large amount of grey liquid, opened this am, pt reports night sweats x 3 nights     Hospital Course  57-year-old female with a past medical history of T2DM, gout, Bell's palsy admitted on 10/13/22 for worsening abdominal pain onset 3 days, complaining of purulent drainage from her abdominal wound, foul-smelling, associated chills, night sweats, abdominal pain and nausea.  Patient had a recent incarcerated ventral hernia repair with small bowel resection on 9/8/2022, was discharged from hospital on 9/12/2022.  Patient was recommended for admission by general surgery, recommending bowel rest, IV antibiotics, possible TPN, recommending conservative management in hopes enterocutaneous fistula spontaneously closes.  Patient was started on IV ceftriaxone and IV Flagyl.  She has been kept NPO.    Interval Problem Update  Patient states she has mild to moderate pain.  Ongoing drainage.  WBC 10.3 down 16.7. Procal 0.13.  On ceftriaxone 2g and flagyl IV.  Bowel rest, consider TPN, recommended by gen sx.  Consider ID consultation.  Wound care consultation pending for ventral hernia surgery site.    I have discussed this patient's plan of care and discharge plan at IDT rounds today with Case Management, Nursing, Nursing leadership, and other members of the IDT team.    Consultants/Specialty  general surgery    Code Status  Full Code    Disposition  Patient is not medically cleared for discharge.   Anticipate discharge to  TBD depending on outcome of fistula resolution and bowel rest or if patient is able to return to PO intake, or else patient will need prolonged TPN .  I have placed the appropriate orders for post-discharge needs.    Review of Systems  Review of Systems   Constitutional:   Negative for chills, fever and malaise/fatigue.   Respiratory:  Negative for cough and shortness of breath.    Cardiovascular:  Negative for chest pain and palpitations.   Gastrointestinal:  Positive for abdominal pain. Negative for constipation, diarrhea, nausea and vomiting.   Musculoskeletal:  Negative for back pain and joint pain.   Neurological:  Negative for dizziness and headaches.   All other systems reviewed and are negative.     Physical Exam  Temp:  [36.8 °C (98.3 °F)-37.3 °C (99.1 °F)] 36.8 °C (98.3 °F)  Pulse:  [81-95] 88  Resp:  [17-20] 18  BP: (102-123)/(57-84) 108/57  SpO2:  [95 %-98 %] 96 %    Physical Exam  Vitals and nursing note reviewed.   Constitutional:       General: She is not in acute distress.     Appearance: She is obese. She is not ill-appearing.   HENT:      Head: Normocephalic and atraumatic.   Eyes:      General: No scleral icterus.     Extraocular Movements: Extraocular movements intact.   Cardiovascular:      Rate and Rhythm: Normal rate.      Pulses: Normal pulses.      Heart sounds: Normal heart sounds.      Comments: Difficult to completely auscultate due to body habitus  Pulmonary:      Effort: Pulmonary effort is normal. No respiratory distress.      Breath sounds: Normal breath sounds.      Comments: Difficult to completely auscultate due to body habitus  Abdominal:      General: Bowel sounds are normal. There is no distension.      Comments: Abdominal open wounds x3, discharge positive, grey appearing, foul smelling. Guaze soaked in discharge. Tender.   Musculoskeletal:         General: No swelling or tenderness. Normal range of motion.      Cervical back: Normal range of motion and neck supple.   Skin:     General: Skin is warm.      Capillary Refill: Capillary refill takes less than 2 seconds.      Coloration: Skin is not jaundiced.      Findings: No erythema.   Neurological:      General: No focal deficit present.      Mental Status: She is alert and oriented to person,  place, and time. Mental status is at baseline.      Motor: No weakness.   Psychiatric:         Mood and Affect: Mood normal.         Behavior: Behavior normal.         Thought Content: Thought content normal.         Judgment: Judgment normal.       Fluids    Intake/Output Summary (Last 24 hours) at 10/14/2022 1503  Last data filed at 10/14/2022 1212  Gross per 24 hour   Intake 840 ml   Output 200 ml   Net 640 ml       Laboratory  Recent Labs     10/13/22  1035 10/14/22  0356   WBC 16.7* 10.3   RBC 4.19* 3.92*   HEMOGLOBIN 10.0* 9.4*   HEMATOCRIT 32.1* 30.2*   MCV 76.6* 77.0*   MCH 23.9* 24.0*   MCHC 31.2* 31.1*   RDW 48.5 49.3   PLATELETCT 483* 466*   MPV 9.3 9.0     Recent Labs     10/13/22  1035 10/14/22  0356   SODIUM 134* 133*   POTASSIUM 3.5* 3.6   CHLORIDE 97 100   CO2 18* 19*   GLUCOSE 191* 90   BUN 13 6*   CREATININE 0.75 0.46*   CALCIUM 9.4 8.8     Recent Labs     10/13/22  1035   INR 1.25*               Imaging  CT-ABDOMEN-PELVIS WITH   Final Result      1.  Edema/infiltrative fluid within the mesentery anteriorly deep to anterior abdominal wall incision without loculated fluid collection identified.      2.  Edema and phlegmonous change within the subcutaneous tissue with apparent small amount of fluid within the midline incision.      3.  Small foci of gas within the subcutaneous tissue and in the inflamed mesentery likely postoperative in nature as there is no evidence of contrast extravasation or intra-abdominal abscess to suggest bowel perforation.      4.  Hepatic steatosis.      5.  Nonspecific left adrenal gland nodule.           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    WBC 10.3 down 16.7. Procal 0.13.  -General Surgery consulted, NPO, likely will need TPN   -IV abx On ceftriaxone 2g and flagyl IV, cultures pending   -wound care   -pain control   Consider ID consultation.    Hypomagnesemia  Assessment & Plan  1.7  Replacing via IV, pt is NPO  Recheck lab daily    Discharge planning issues- (present on admission)  Assessment & Plan  TBD depending on outcome of fistula resolution and bowel rest or if patient is able to return to PO intake, or else patient will need prolonged TPN    Sepsis (HCC)- (present on admission)  Assessment & Plan  This is Sepsis Present on admission  SIRS criteria identified on my evaluation include: Tachycardia, with heart rate greater than 90 BPM, Tachypnea, with respirations greater than 20 per minute and Leukocytosis, with WBC greater than 12,000  Source is abdominal   Sepsis protocol initiated  Fluid resuscitation ordered per protocol  Crystalloid Fluid Administration: Fluid resuscitation ordered per standard protocol - 30 mL/kg per current or ideal body weight  IV antibiotics as appropriate for source of sepsis  Reassessment: I have reassessed the patient's hemodynamic status    Rocephin and flagyl   Cultures pending   Surgery consulted, no abscess on CT   Plan as above    Hypokalemia- (present on admission)  Assessment & Plan  IV Replace as needed   Replacing mag    Morbid obesity with BMI of 50.0-59.9, adult ([facility])- (present on admission)  Assessment & Plan  Noted, patient has been working on weight loss at home   Lost 35 lbs prior to previous admission   Counseling when appropriate    Primary hypertension- (present on admission)  Assessment & Plan  PRN IV antihypertensives    DM (diabetes mellitus) (HCC)- (present on admission)  Assessment & Plan  ISS with hypoglycemic protocol  A1c 6.9, controlled  Glucose levels lower 90 this morning from 191.  Hold all PO home meds.       VTE  prophylaxis: enoxaparin ppx    I have performed a physical exam and reviewed and updated ROS and Plan today (10/14/2022). In review of yesterday's note (10/13/2022), there are no changes except as documented above.

## 2022-10-14 NOTE — PROGRESS NOTES
"  DATE: 10/14/2022        INTERVAL EVENTS:  Abdominal wound drainage decreased significantly, now more serosanguinous    REVIEW OF SYSTEMS: Review of systems is remarkable for the following abdominal pain. The remainder of the comprehensive ROS is negative with the exception of the aforementioned HPI, PMH, and PSH bullets in accordance with CMS guideline.    PHYSICAL EXAMINATION:      Vital Signs: /57   Pulse 88   Temp 36.8 °C (98.3 °F) (Oral)   Resp 18   Ht 1.549 m (5' 1\")   Wt (!) 126 kg (277 lb 12.5 oz)   SpO2 96%   Physical Exam  Midline line abdominal incision: intact except for two defects, superior and inferior. Both approximately 2cm in diameter. Base of both wound clean, granulating.  Skin and subcutaneous tissue surrounding the wound is viable, no cellulitis or induration appreciated.   LABORATORY VALUES:   Recent Labs     10/13/22  1035 10/14/22  0356   WBC 16.7* 10.3   RBC 4.19* 3.92*   HEMOGLOBIN 10.0* 9.4*   HEMATOCRIT 32.1* 30.2*   MCV 76.6* 77.0*   MCH 23.9* 24.0*   MCHC 31.2* 31.1*   RDW 48.5 49.3   PLATELETCT 483* 466*   MPV 9.3 9.0     Recent Labs     10/13/22  1035 10/14/22  0356   SODIUM 134* 133*   POTASSIUM 3.5* 3.6   CHLORIDE 97 100   CO2 18* 19*   GLUCOSE 191* 90   BUN 13 6*   CREATININE 0.75 0.46*   CALCIUM 9.4 8.8     Recent Labs     10/13/22  1035 10/14/22  0356   ASTSGOT 11* 15   ALTSGPT 9 10   TBILIRUBIN 0.4 0.2   ALKPHOSPHAT 78 82   GLOBULIN 4.6* 4.5*   INR 1.25*  --      Recent Labs     10/13/22  1035   INR 1.25*        IMAGING:   CT-ABDOMEN-PELVIS WITH   Final Result      1.  Edema/infiltrative fluid within the mesentery anteriorly deep to anterior abdominal wall incision without loculated fluid collection identified.      2.  Edema and phlegmonous change within the subcutaneous tissue with apparent small amount of fluid within the midline incision.      3.  Small foci of gas within the subcutaneous tissue and in the inflamed mesentery likely postoperative in nature as " there is no evidence of contrast extravasation or intra-abdominal abscess to suggest bowel perforation.      4.  Hepatic steatosis.      5.  Nonspecific left adrenal gland nodule.          ASSESSMENT AND PLAN:   Post op open repair of strangulated ventral hernia, SBR 9/8  Abdominal wall abscess---spontaneously drained.   CT abdomen/pelvis without oral contrast shows inflammation in small bowel mesentery tracking to abdominal wall wound, ?enterocutaneous fistula.  Continue current management  NPO  Wound care consult placed.  IV antbiotics  If drainage continues to decrease then will start clear liquids and continue to monitor output.  No indication for surgical intervention at this time  Surgery will follow.          ____________________________________     Bambi Barr M.D.    DD: 10/14/2022  2:51 PM

## 2022-10-14 NOTE — PROGRESS NOTES
Received bedside patient report from MITRA Lassiter. Patient resting comfortably in bed, no complaints at this time. Safety precautions in place. Will continue to monitor.

## 2022-10-14 NOTE — THERAPY
Physical Therapy   Initial Evaluation     Patient Name: Jordyn Rick  Age:  57 y.o., Sex:  female  Medical Record #: [id number]  Today's Date: 10/14/2022          Assessment  Patient is 57 y.o. female with a diagnosis Fistula.Pt lives at home alone and is usually active.Pt is safe with bed mob,transfers and ambulation,no equipment needed.No acute PT needs    Plan    Recommend Physical Therapy for Evaluation only     DC Equipment Recommendations: (P) None  Discharge Recommendations: (P) Anticipate that the patient will have no further physical therapy needs after discharge from the hospital        Objective       10/14/22 1400   Charge Group   PT Evaluation PT Evaluation Low   Total Time Spent   PT Total Time Yes   PT Evaluation Time Spent (Mins) 25   Initial Contact Note    Initial Contact Note Order Received and Verified, Physical Therapy Evaluation in Progress with Full Report to Follow.   Prior Living Situation   Prior Services None   Housing / Facility 2 Story Apartment / Condo   Steps Into Home 17   Steps In Home 0   Equipment Owned None   Lives with - Patient's Self Care Capacity Alone and Able to Care For Self   Prior Level of Functional Mobility   Bed Mobility Independent   Transfer Status Independent   Ambulation Independent   Distance Ambulation (Feet)   (community amb)   Assistive Devices Used None   Stairs Independent   Cognition    Cognition / Consciousness WDL   Passive ROM Lower Body   Passive ROM Lower Body WDL   Active ROM Lower Body    Active ROM Lower Body  WDL   Strength Lower Body   Lower Body Strength  WDL   Coordination Lower Body    Coordination Lower Body  WDL   Balance Assessment   Sitting Balance (Static) Good   Sitting Balance (Dynamic) Good   Standing Balance (Static) Good   Standing Balance (Dynamic) Good   Weight Shift Sitting Good   Weight Shift Standing Good   Gait Analysis   Gait Level Of Assist Supervised   Assistive Device None   Distance (Feet) 300   # of Times Distance was Traveled  1   Deviation Step To   # of Stairs Climbed 0   Weight Bearing Status full   Comments Hold on stairs per Dr   Bed Mobility    Supine to Sit Modified Independent   Sit to Supine Modified Independent   Scooting Modified Independent   Functional Mobility   Sit to Stand Supervised   Bed, Chair, Wheelchair Transfer Supervised   Transfer Method Stand Step   How much difficulty does the patient currently have...   Turning over in bed (including adjusting bedclothes, sheets and blankets)? 4   Sitting down on and standing up from a chair with arms (e.g., wheelchair, bedside commode, etc.) 4   Moving from lying on back to sitting on the side of the bed? 4   How much help from another person does the patient currently need...   Moving to and from a bed to a chair (including a wheelchair)? 4   Need to walk in a hospital room? 4   Climbing 3-5 steps with a railing? 4   6 clicks Mobility Score 24   Activity Tolerance   Sitting Edge of Bed 10   Standing 10   Patient / Family Goals    Patient / Family Goal #1 Home   Anticipated Discharge Equipment and Recommendations   DC Equipment Recommendations None   Discharge Recommendations Anticipate that the patient will have no further physical therapy needs after discharge from the hospital   Interdisciplinary Plan of Care Collaboration   IDT Collaboration with  Nursing   Session Information   Date / Session Number  10/14   Priority 0

## 2022-10-15 PROBLEM — D75.838 REACTIVE THROMBOCYTOSIS: Status: ACTIVE | Noted: 2022-10-15

## 2022-10-15 LAB
ALBUMIN SERPL BCP-MCNC: 2.9 G/DL (ref 3.2–4.9)
BACTERIA UR CULT: NORMAL
BUN SERPL-MCNC: 7 MG/DL (ref 8–22)
CALCIUM SERPL-MCNC: 8.7 MG/DL (ref 8.4–10.2)
CHLORIDE SERPL-SCNC: 102 MMOL/L (ref 96–112)
CO2 SERPL-SCNC: 24 MMOL/L (ref 20–33)
CREAT SERPL-MCNC: 0.41 MG/DL (ref 0.5–1.4)
ERYTHROCYTE [DISTWIDTH] IN BLOOD BY AUTOMATED COUNT: 50.1 FL (ref 35.9–50)
GFR SERPLBLD CREATININE-BSD FMLA CKD-EPI: 115 ML/MIN/1.73 M 2
GLUCOSE BLD STRIP.AUTO-MCNC: 104 MG/DL (ref 65–99)
GLUCOSE BLD STRIP.AUTO-MCNC: 117 MG/DL (ref 65–99)
GLUCOSE BLD STRIP.AUTO-MCNC: 143 MG/DL (ref 65–99)
GLUCOSE SERPL-MCNC: 119 MG/DL (ref 65–99)
HCT VFR BLD AUTO: 27.9 % (ref 37–47)
HGB BLD-MCNC: 8.4 G/DL (ref 12–16)
MAGNESIUM SERPL-MCNC: 1.6 MG/DL (ref 1.5–2.5)
MCH RBC QN AUTO: 23.5 PG (ref 27–33)
MCHC RBC AUTO-ENTMCNC: 30.1 G/DL (ref 33.6–35)
MCV RBC AUTO: 77.9 FL (ref 81.4–97.8)
PHOSPHATE SERPL-MCNC: 4 MG/DL (ref 2.5–4.5)
PLATELET # BLD AUTO: 449 K/UL (ref 164–446)
PMV BLD AUTO: 8.8 FL (ref 9–12.9)
POTASSIUM SERPL-SCNC: 3.9 MMOL/L (ref 3.6–5.5)
RBC # BLD AUTO: 3.58 M/UL (ref 4.2–5.4)
SIGNIFICANT IND 70042: NORMAL
SITE SITE: NORMAL
SODIUM SERPL-SCNC: 139 MMOL/L (ref 135–145)
SOURCE SOURCE: NORMAL
WBC # BLD AUTO: 6.3 K/UL (ref 4.8–10.8)

## 2022-10-15 PROCEDURE — 99232 SBSQ HOSP IP/OBS MODERATE 35: CPT | Performed by: INTERNAL MEDICINE

## 2022-10-15 PROCEDURE — 36415 COLL VENOUS BLD VENIPUNCTURE: CPT

## 2022-10-15 PROCEDURE — 700101 HCHG RX REV CODE 250: Performed by: INTERNAL MEDICINE

## 2022-10-15 PROCEDURE — 82962 GLUCOSE BLOOD TEST: CPT | Mod: 91

## 2022-10-15 PROCEDURE — 700111 HCHG RX REV CODE 636 W/ 250 OVERRIDE (IP): Performed by: INTERNAL MEDICINE

## 2022-10-15 PROCEDURE — 85027 COMPLETE CBC AUTOMATED: CPT

## 2022-10-15 PROCEDURE — 80069 RENAL FUNCTION PANEL: CPT

## 2022-10-15 PROCEDURE — 700105 HCHG RX REV CODE 258: Performed by: INTERNAL MEDICINE

## 2022-10-15 PROCEDURE — 700102 HCHG RX REV CODE 250 W/ 637 OVERRIDE(OP): Performed by: INTERNAL MEDICINE

## 2022-10-15 PROCEDURE — 770006 HCHG ROOM/CARE - MED/SURG/GYN SEMI*

## 2022-10-15 PROCEDURE — 83735 ASSAY OF MAGNESIUM: CPT

## 2022-10-15 PROCEDURE — 94760 N-INVAS EAR/PLS OXIMETRY 1: CPT

## 2022-10-15 PROCEDURE — A9270 NON-COVERED ITEM OR SERVICE: HCPCS | Performed by: INTERNAL MEDICINE

## 2022-10-15 RX ORDER — TRAZODONE HYDROCHLORIDE 50 MG/1
50 TABLET ORAL
Status: DISCONTINUED | OUTPATIENT
Start: 2022-10-15 | End: 2022-10-19 | Stop reason: HOSPADM

## 2022-10-15 RX ADMIN — ENOXAPARIN SODIUM 60 MG: 60 INJECTION SUBCUTANEOUS at 17:53

## 2022-10-15 RX ADMIN — SODIUM HYPOCHLORITE 1 ML: 1.25 SOLUTION TOPICAL at 17:53

## 2022-10-15 RX ADMIN — CEFTRIAXONE SODIUM 2 G: 2 INJECTION, POWDER, FOR SOLUTION INTRAMUSCULAR; INTRAVENOUS at 11:22

## 2022-10-15 RX ADMIN — METRONIDAZOLE 500 MG: 5 INJECTION, SOLUTION INTRAVENOUS at 21:29

## 2022-10-15 RX ADMIN — MORPHINE SULFATE 4 MG: 4 INJECTION INTRAVENOUS at 00:37

## 2022-10-15 RX ADMIN — ENOXAPARIN SODIUM 60 MG: 60 INJECTION SUBCUTANEOUS at 05:00

## 2022-10-15 RX ADMIN — METRONIDAZOLE 500 MG: 5 INJECTION, SOLUTION INTRAVENOUS at 14:23

## 2022-10-15 RX ADMIN — METRONIDAZOLE 500 MG: 5 INJECTION, SOLUTION INTRAVENOUS at 05:00

## 2022-10-15 RX ADMIN — MORPHINE SULFATE 4 MG: 4 INJECTION INTRAVENOUS at 08:47

## 2022-10-15 RX ADMIN — MORPHINE SULFATE 4 MG: 4 INJECTION INTRAVENOUS at 15:34

## 2022-10-15 RX ADMIN — MORPHINE SULFATE 4 MG: 4 INJECTION INTRAVENOUS at 20:08

## 2022-10-15 RX ADMIN — MORPHINE SULFATE 4 MG: 4 INJECTION INTRAVENOUS at 04:37

## 2022-10-15 RX ADMIN — TRAZODONE HYDROCHLORIDE 50 MG: 50 TABLET ORAL at 21:29

## 2022-10-15 ASSESSMENT — ENCOUNTER SYMPTOMS
CONSTIPATION: 0
CHILLS: 0
VOMITING: 0
DIZZINESS: 0
DIARRHEA: 0
PALPITATIONS: 0
BACK PAIN: 0
HEADACHES: 0
FEVER: 0
ABDOMINAL PAIN: 1
NAUSEA: 0
COUGH: 0
SHORTNESS OF BREATH: 0

## 2022-10-15 ASSESSMENT — PAIN DESCRIPTION - PAIN TYPE
TYPE: ACUTE PAIN

## 2022-10-15 NOTE — PROGRESS NOTES
"  DATE: 10/15/2022      INTERVAL EVENTS:  Wound care eval and placement of wound manager        PHYSICAL EXAMINATION:      Vital Signs: /56   Pulse 79   Temp 36.9 °C (98.4 °F) (Oral)   Resp 18   Ht 1.549 m (5' 1\")   Wt (!) 126 kg (277 lb 12.5 oz)   SpO2 97%       LABORATORY VALUES:   Recent Labs     10/13/22  1035 10/14/22  0356 10/15/22  0506   WBC 16.7* 10.3 6.3   RBC 4.19* 3.92* 3.58*   HEMOGLOBIN 10.0* 9.4* 8.4*   HEMATOCRIT 32.1* 30.2* 27.9*   MCV 76.6* 77.0* 77.9*   MCH 23.9* 24.0* 23.5*   MCHC 31.2* 31.1* 30.1*   RDW 48.5 49.3 50.1*   PLATELETCT 483* 466* 449*   MPV 9.3 9.0 8.8*     Recent Labs     10/13/22  1035 10/14/22  0356 10/15/22  0506   SODIUM 134* 133* 139   POTASSIUM 3.5* 3.6 3.9   CHLORIDE 97 100 102   CO2 18* 19* 24   GLUCOSE 191* 90 119*   BUN 13 6* 7*   CREATININE 0.75 0.46* 0.41*   CALCIUM 9.4 8.8 8.7     Recent Labs     10/13/22  1035 10/14/22  0356   ASTSGOT 11* 15   ALTSGPT 9 10   TBILIRUBIN 0.4 0.2   ALKPHOSPHAT 78 82   GLOBULIN 4.6* 4.5*   INR 1.25*  --      Recent Labs     10/13/22  1035   INR 1.25*        IMAGING:   CT-ABDOMEN-PELVIS WITH   Final Result      1.  Edema/infiltrative fluid within the mesentery anteriorly deep to anterior abdominal wall incision without loculated fluid collection identified.      2.  Edema and phlegmonous change within the subcutaneous tissue with apparent small amount of fluid within the midline incision.      3.  Small foci of gas within the subcutaneous tissue and in the inflamed mesentery likely postoperative in nature as there is no evidence of contrast extravasation or intra-abdominal abscess to suggest bowel perforation.      4.  Hepatic steatosis.      5.  Nonspecific left adrenal gland nodule.          ASSESSMENT AND PLAN:     CPM  IV antibiotics  No enteric drainage from wound to suggest EC fistula  This may have been intra abdominal abscess that drained  Start Clear liquids. Monitor output from wound  Surgery will follow           " ____________________________________     Bambi JUNAID Barr M.D.    DD: 10/15/2022  1:38 PM

## 2022-10-15 NOTE — CARE PLAN
The patient is Stable - Low risk of patient condition declining or worsening    Shift Goals  Clinical Goals: Pain decrease at least 1 point after interventions  Patient Goals: Wound care this shift    Progress made toward(s) clinical / shift goals:  Pain decreased at least 1 point after interventions. Pt seen by wound care this shift.     Problem: Knowledge Deficit - Standard  Goal: Patient and family/care givers will demonstrate understanding of plan of care, disease process/condition, diagnostic tests and medications  Outcome: Progressing     Problem: Pain - Standard  Goal: Alleviation of pain or a reduction in pain to the patient’s comfort goal  Outcome: Progressing     Problem: Skin Integrity  Goal: Skin integrity is maintained or improved  Outcome: Progressing       Patient is not progressing towards the following goals:

## 2022-10-15 NOTE — PROGRESS NOTES
[facility] Medicine Daily Progress Note    Date of Service  10/15/2022    Chief Complaint  Jordyn Rick is a 57 y.o. female admitted 10/13/2022 with   Chief Complaint   Patient presents with    Post-Op Complications     Wound to abd draining large amount of grey liquid, opened this am, pt reports night sweats x 3 nights     Hospital Course  57-year-old female with a past medical history of T2DM, gout, Bell's palsy admitted on 10/13/22 for worsening abdominal pain onset 3 days, complaining of purulent drainage from her abdominal wound, foul-smelling, associated chills, night sweats, abdominal pain and nausea.  Patient had a recent incarcerated ventral hernia repair with small bowel resection on 9/8/2022, was discharged from hospital on 9/12/2022.  Patient was recommended for admission by general surgery, recommending bowel rest, IV antibiotics, possible TPN, recommending conservative management in hopes enterocutaneous fistula spontaneously closes.  Patient was started on IV ceftriaxone and IV Flagyl.  She has been kept NPO.    Interval Problem Update  Patient states she has mild pain.  Ongoing drainage. Mag 1.6.  WBC 6.3, from 10.3, 16.7. Procal 0.13.  On ceftriaxone 2g and flagyl IV.  Gen Sx recommending clear liquid diet.  Consider ID consultation.  Wound care consulted, has evaluated patient.    I have discussed this patient's plan of care and discharge plan at IDT rounds today with Case Management, Nursing, Nursing leadership, and other members of the IDT team.    Consultants/Specialty  general surgery    Code Status  Full Code    Disposition  Patient is not medically cleared for discharge.   Anticipate discharge to  TBD depending on outcome of fistula resolution and bowel rest or if patient is able to return to PO intake, or else patient will need prolonged TPN .  I have placed the appropriate orders for post-discharge needs.    Review of Systems  Review of Systems   Constitutional:  Negative for chills, fever and  malaise/fatigue.   Respiratory:  Negative for cough and shortness of breath.    Cardiovascular:  Negative for chest pain and palpitations.   Gastrointestinal:  Positive for abdominal pain. Negative for constipation, diarrhea, nausea and vomiting.   Musculoskeletal:  Negative for back pain and joint pain.   Neurological:  Negative for dizziness and headaches.   All other systems reviewed and are negative.     Physical Exam  Temp:  [36.4 °C (97.5 °F)-36.9 °C (98.4 °F)] 36.9 °C (98.4 °F)  Pulse:  [79-96] 79  Resp:  [16-20] 18  BP: (116-135)/(53-67) 116/56  SpO2:  [94 %-99 %] 97 %    Physical Exam  Vitals and nursing note reviewed.   Constitutional:       General: She is not in acute distress.     Appearance: She is obese. She is not ill-appearing.   Cardiovascular:      Rate and Rhythm: Normal rate.      Pulses: Normal pulses.      Heart sounds: Normal heart sounds.      Comments: Difficult to completely auscultate due to body habitus  Pulmonary:      Effort: Pulmonary effort is normal.      Breath sounds: Normal breath sounds.   Abdominal:      General: Bowel sounds are normal. There is no distension.      Comments: Abdominal open wounds x3, discharge positive, now with bag to collect fluid. +Tender.   Musculoskeletal:         General: No swelling or tenderness. Normal range of motion.   Skin:     General: Skin is warm.      Coloration: Skin is not jaundiced.      Findings: No erythema.   Neurological:      Mental Status: She is alert and oriented to person, place, and time. Mental status is at baseline.      Motor: No weakness.   Psychiatric:         Mood and Affect: Mood normal.         Behavior: Behavior normal.       Fluids    Intake/Output Summary (Last 24 hours) at 10/15/2022 1445  Last data filed at 10/14/2022 1800  Gross per 24 hour   Intake 0 ml   Output --   Net 0 ml       Laboratory  Recent Labs     10/13/22  1035 10/14/22  0356 10/15/22  0506   WBC 16.7* 10.3 6.3   RBC 4.19* 3.92* 3.58*   HEMOGLOBIN 10.0*  9.4* 8.4*   HEMATOCRIT 32.1* 30.2* 27.9*   MCV 76.6* 77.0* 77.9*   MCH 23.9* 24.0* 23.5*   MCHC 31.2* 31.1* 30.1*   RDW 48.5 49.3 50.1*   PLATELETCT 483* 466* 449*   MPV 9.3 9.0 8.8*     Recent Labs     10/13/22  1035 10/14/22  0356 10/15/22  0506   SODIUM 134* 133* 139   POTASSIUM 3.5* 3.6 3.9   CHLORIDE 97 100 102   CO2 18* 19* 24   GLUCOSE 191* 90 119*   BUN 13 6* 7*   CREATININE 0.75 0.46* 0.41*   CALCIUM 9.4 8.8 8.7     Recent Labs     10/13/22  1035   INR 1.25*               Imaging  CT-ABDOMEN-PELVIS WITH   Final Result      1.  Edema/infiltrative fluid within the mesentery anteriorly deep to anterior abdominal wall incision without loculated fluid collection identified.      2.  Edema and phlegmonous change within the subcutaneous tissue with apparent small amount of fluid within the midline incision.      3.  Small foci of gas within the subcutaneous tissue and in the inflamed mesentery likely postoperative in nature as there is no evidence of contrast extravasation or intra-abdominal abscess to suggest bowel perforation.      4.  Hepatic steatosis.      5.  Nonspecific left adrenal gland nodule.           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    WBC 6.3, from 10.3, 16.7. Procal 0.13.  -General Surgery following.  recommending clear liquid diet.  -IV abx On ceftriaxone 2g and flagyl IV, cultures not obtained in ED upon arrival.  -wound care   -pain control   Consider ID consultation.    Reactive thrombocytosis- (present on admission)  Assessment & Plan  483 on admission, reactive to current infection.  Now 449  improving  IVF gentle, recheck labs    Hypomagnesemia  Assessment & Plan  1.7  Down to 1.6  Replacing via IV  Recheck lab daily    Discharge planning issues- (present on admission)  Assessment & Plan  TBD depending on outcome of fistula resolution and bowel rest or if patient is able to return to PO intake, or else patient will need prolonged TPN    Sepsis (HCC)- (present on admission)  Assessment & Plan  This is Sepsis Present on admission  SIRS criteria identified on my evaluation include: Tachycardia, with heart rate greater than 90 BPM, Tachypnea, with respirations greater than 20 per minute and Leukocytosis, with WBC greater than 12,000  Source is abdominal   Sepsis protocol initiated  Fluid resuscitation ordered per protocol  Crystalloid Fluid Administration: Fluid resuscitation ordered per standard protocol - 30 mL/kg per current or ideal body weight  IV antibiotics as appropriate for source of sepsis  Reassessment: I have reassessed the patient's hemodynamic status    Rocephin and flagyl   Cultures pending   Surgery consulted, no abscess on CT   Plan as above    Hypokalemia- (present on admission)  Assessment & Plan  IV Replace as needed   Replacing mag    Morbid obesity with BMI of 50.0-59.9, adult (HCC)- (present on admission)  Assessment & Plan  Noted, patient has been working on weight loss at home   Lost 35 lbs prior to previous admission   Counseling when appropriate    Primary hypertension- (present on admission)  Assessment & Plan  PRN IV antihypertensives    DM (diabetes mellitus) (HCC)- (present on admission)  Assessment & Plan  ISS with hypoglycemic protocol  A1c 6.9, controlled  Glucose levels lower 90 this morning from 191.  Hold all PO home meds.     VTE prophylaxis: enoxaparin ppx    I have performed a physical exam and reviewed and updated ROS and Plan today (10/15/2022). In review of yesterday's note (10/14/2022), there are no changes except as documented above.

## 2022-10-15 NOTE — PROGRESS NOTES
Received bedside patient report from MITRA Echeverria. Patient resting comfortably in bed, no complaints at this time. Safety precautions in place. Will continue to monitor.

## 2022-10-15 NOTE — WOUND TEAM
"[facility]  Inpatient Services  Initial Wound and Skin Care Evaluation    Admission Date: 10/13/2022     Last order of IP CONSULT TO WOUND CARE was found on 10/13/2022 from Hospital Encounter on 10/13/2022     HPI, PMH, SH: Reviewed    Past Surgical History:   Procedure Laterality Date    VENTRAL HERNIA REPAIR N/A 9/8/2022    Procedure: OPEN INCARCERATED VENTRAL HERNIA REPAIR, SMALL BOWEL RESECTION;  Surgeon: Myranda Pepper M.D.;  Location: SURGERY [facility];  Service: General     Social History     Tobacco Use    Smoking status: Never    Smokeless tobacco: Never   Substance Use Topics    Alcohol use: Yes     Comment: twice a month     Chief Complaint   Patient presents with    Post-Op Complications     Wound to abd draining large amount of grey liquid, opened this am, pt reports night sweats x 3 nights     Diagnosis: Enterocutaneous fistula [K63.2]    Unit where seen by Wound Team: 2214/02     WOUND CONSULT/FOLLOW UP RELATED TO:  abdomen    WOUND HISTORY:  Per Dr. Grijalva \"This is a 57 y.o. female with PMH significant for morbid obesity and DM II who is s/p incarcerated ventral hernia repair on 9/8/22, which was complicated by PO wound infection. Unfortubatey today she woke up with fevers and chills, and increasing wound drainage that was grey and foul smelling.  She denies fevers, chills, nausea or vomiting or any other changes in her BMs. She does endorse incisional pain.\"    WOUND ASSESSMENT/LDA    Wound 10/13/22 Fistula Umbilicus;Abdomen Mid;Lower x3 open areas (Active)   Wound Image     10/14/22 1600   Site Assessment Pink;Red;Drainage 10/14/22 1600   Periwound Assessment Clean;Dry;Intact;Scar tissue 10/14/22 1600   Margins Defined edges 10/14/22 1600   Closure Secondary intention 10/14/22 1600   Drainage Amount Small 10/14/22 1600   Drainage Description Serosanguineous;Brown 10/14/22 1600   Treatments Cleansed;Site care;Chemical Debridement 10/14/22 1600   Wound Cleansing Normal " Saline Irrigation 10/14/22 1600   Periwound Protectant Skin Protectant Wipes to Periwound 10/14/22 1600   Dressing Cleansing/Solutions 1/4 Strength Dakin's Solution 10/14/22 1600   Dressing Options Moist Roll Gauze;Silicone Adhesive Foam;Wound Manager 10/14/22 1600   Dressing Changed Changed 10/14/22 1600   Dressing Status Clean;Dry;Intact 10/14/22 1600   Dressing Change/Treatment Frequency Every Shift, and As Needed 10/14/22 1600   NEXT Dressing Change/Treatment Date 10/15/22 10/14/22 1600   NEXT Weekly Photo (Inpatient Only) 10/21/22 10/14/22 1600   Non-staged Wound Description Full thickness 10/14/22 1600   Wound Length (cm) 11 cm 10/14/22 1600   Wound Width (cm) 3 cm 10/14/22 1600   Wound Surface Area (cm^2) 33 cm^2 10/14/22 1600   Shape Cricle x3 10/14/22 1600   Wound Odor None 10/14/22 1600   Exposed Structures SHIRA 10/14/22 1600   WOUND NURSE ONLY - Time Spent with Patient (mins) 45 10/14/22 1600         Vascular:    TIGRE:   No results found.    Lab Values:    Lab Results   Component Value Date/Time    WBC 10.3 10/14/2022 03:56 AM    RBC 3.92 (L) 10/14/2022 03:56 AM    HEMOGLOBIN 9.4 (L) 10/14/2022 03:56 AM    HEMATOCRIT 30.2 (L) 10/14/2022 03:56 AM    HBA1C 6.9 (H) 10/14/2022 03:56 AM        Culture Results show:  No results found for this or any previous visit (from the past 720 hour(s)).    Pain Level/Medicated:  Received IV morphine during wound care    INTERVENTIONS BY WOUND TEAM:  Chart and images reviewed. Discussed with bedside RN. All areas of concern (based on picture review, LDA review and discussion with bedside RN) have been thoroughly assessed. Documentation of areas based on significant findings. This RN in to assess patient. Performed standard wound care which includes appropriate positioning, dressing removal and non-selective debridement. Pictures and measurements obtained weekly if/when required.  Preparation for Dressing removal: Removed without difficulty  Non-selectively Debrided with:  NS  and gauze.  Sharp debridement: NA  Paulette wound: Cleansed with NS, Prepped with no sting    PROXIMAL OPENING  Primary Dressing: NS moistened gauze (dakin's ordered)  Secondary (Outer) Dressing: medium wound manager    DISTAL OPENING x2  Primary Dressing: NS moistened gauze (dakin's ordered)  Secondary (Outer) Dressing: folded dry gauze, silicone adhesive foam    Interdisciplinary consultation: Patient, Bedside RN (Jacki), Wound RN (Juliana)    EVALUATION / RATIONALE FOR TREATMENT:  Most Recent Date:  10/14/22: Patient with enterocutaneous fistula to abdomen with x3 open areas. Small brown tinged, SS drainage mostly from proximal opening during wound care. Able to lightly probe proximal opening about 1.5cm. Distal open areas more shallow, communicating underneath with skin bridge in between. Wound manager applied to manage drainage and protect surrounding intact skin. Will use larger wound manager on next change to address all three openings.     Goals: Steady decrease in wound area and depth weekly.    WOUND TEAM PLAN OF CARE ([X] for frequency of wound follow up,):   Nursing to follow dressing orders written for wound care. Contact wound team if area fails to progress, deteriorates or with any questions/concerns if something comes up before next scheduled follow up (See below as to whether wound is following and frequency of wound follow up)  Dressing changes by wound team:                   Follow up 3 times weekly:                NPWT change 3 times weekly:     Follow up 1-2 times weekly:    X  Follow up Bi-Monthly:           Follow up Monthly (High Risk):                        Follow up as needed:     Other (explain):     NURSING PLAN OF CARE ORDERS (X):  Dressing changes: See Dressing Care orders: X  Skin care: See Skin Care orders:   RN Prevention Protocol:   Rectal tube care: See Rectal Tube Care orders:   Other orders:    RSKIN:   CURRENTLY IN PLACE (X), APPLIED THIS VISIT (A), ORDERED (O):   Q shift Michael:   X  Q shift pressure point assessments:  X    Surface/Positioning   Pressure redistribution mattress     X       Low Airloss          ICU Low Airloss   Bariatric LIBIA     Waffle cushion        Waffle Overlay          Reposition q 2 hours      TAPs Turning system     Z Fabian Pillow     Offloading/Redistribution   Sacral Mepilex (Silicone dressing)     Heel Mepilex (Silicone dressing)         Heel float boots (Prevalon boot)             Float Heels off Bed with Pillows           Respiratory NA RA  Silicone O2 tubing         Gray Foam Ear protectors     Cannula fixation Device (Tender )          High flow offloading Clip    Elastic head band offloading device      Anchorfast                                                         Trach with Optifoam split foam             Containment/Moisture Prevention pt is continent  Rectal tube or BMS    Purwick/Condom Cath        Antonio Catheter    Barrier wipes           Barrier paste       Antifungal tx      Interdry        Mobilization   Up to chair        Ambulate    X  PT/OT      Nutrition     Dietician        Diabetes Education      PO     TF     TPN  (to be ordered)   NPO   1 # days     Other        Anticipated discharge plans: TBD  LTACH:        SNF/Rehab:                  Home Health Care:           Outpatient Wound Center:            Self/Family Care:        Other:                  Vac Discharge Needs:   Not Applicable Pt not on a wound vac:     X  Regular Vac while inpatient, alternative dressing at DC:        Regular Vac in use and continued at DC:            Reg. Vac w/ Skin Sub/Biologic in use. Will need to be changed 2x wkly:      Veraflo Vac while inpatient, ok to transition to Regular Vac on Discharge:           Veraflo Vac while inpatient, will need to remain on Veraflo Vac upon discharge:

## 2022-10-15 NOTE — CARE PLAN
The patient is Stable - Low risk of patient condition declining or worsening    Shift Goals  Clinical Goals: Free from falls or injuries, rest and sleep at least 4 hours, pain control as evidenced by sleeping  Patient Goals: Free from falls or injuries, rest and sleep at least 4 hours, pain control as evidenced by sleeping    Progress made toward(s) clinical / shift goals: No falls or injuries, rested and slept at least 4 hours, pain controlled as evidenced by sleeping    Patient is not progressing towards the following goals:

## 2022-10-16 LAB
ALBUMIN SERPL BCP-MCNC: 2.7 G/DL (ref 3.2–4.9)
BUN SERPL-MCNC: 8 MG/DL (ref 8–22)
CALCIUM SERPL-MCNC: 8.2 MG/DL (ref 8.4–10.2)
CHLORIDE SERPL-SCNC: 101 MMOL/L (ref 96–112)
CO2 SERPL-SCNC: 20 MMOL/L (ref 20–33)
CREAT SERPL-MCNC: 0.44 MG/DL (ref 0.5–1.4)
ERYTHROCYTE [DISTWIDTH] IN BLOOD BY AUTOMATED COUNT: 51 FL (ref 35.9–50)
GFR SERPLBLD CREATININE-BSD FMLA CKD-EPI: 113 ML/MIN/1.73 M 2
GLUCOSE BLD STRIP.AUTO-MCNC: 100 MG/DL (ref 65–99)
GLUCOSE BLD STRIP.AUTO-MCNC: 108 MG/DL (ref 65–99)
GLUCOSE BLD STRIP.AUTO-MCNC: 109 MG/DL (ref 65–99)
GLUCOSE BLD STRIP.AUTO-MCNC: 112 MG/DL (ref 65–99)
GLUCOSE BLD STRIP.AUTO-MCNC: 145 MG/DL (ref 65–99)
GLUCOSE SERPL-MCNC: 120 MG/DL (ref 65–99)
HCT VFR BLD AUTO: 29.3 % (ref 37–47)
HGB BLD-MCNC: 8.7 G/DL (ref 12–16)
MAGNESIUM SERPL-MCNC: 1.4 MG/DL (ref 1.5–2.5)
MCH RBC QN AUTO: 23.5 PG (ref 27–33)
MCHC RBC AUTO-ENTMCNC: 29.7 G/DL (ref 33.6–35)
MCV RBC AUTO: 79 FL (ref 81.4–97.8)
PHOSPHATE SERPL-MCNC: 3.3 MG/DL (ref 2.5–4.5)
PLATELET # BLD AUTO: 463 K/UL (ref 164–446)
PMV BLD AUTO: 9.2 FL (ref 9–12.9)
POTASSIUM SERPL-SCNC: 3.3 MMOL/L (ref 3.6–5.5)
RBC # BLD AUTO: 3.71 M/UL (ref 4.2–5.4)
SODIUM SERPL-SCNC: 136 MMOL/L (ref 135–145)
WBC # BLD AUTO: 5.7 K/UL (ref 4.8–10.8)

## 2022-10-16 PROCEDURE — 700111 HCHG RX REV CODE 636 W/ 250 OVERRIDE (IP): Performed by: INTERNAL MEDICINE

## 2022-10-16 PROCEDURE — 700105 HCHG RX REV CODE 258: Performed by: INTERNAL MEDICINE

## 2022-10-16 PROCEDURE — A9270 NON-COVERED ITEM OR SERVICE: HCPCS | Performed by: INTERNAL MEDICINE

## 2022-10-16 PROCEDURE — 94760 N-INVAS EAR/PLS OXIMETRY 1: CPT

## 2022-10-16 PROCEDURE — 36415 COLL VENOUS BLD VENIPUNCTURE: CPT

## 2022-10-16 PROCEDURE — 700101 HCHG RX REV CODE 250: Performed by: INTERNAL MEDICINE

## 2022-10-16 PROCEDURE — 80069 RENAL FUNCTION PANEL: CPT

## 2022-10-16 PROCEDURE — 82962 GLUCOSE BLOOD TEST: CPT | Mod: 91

## 2022-10-16 PROCEDURE — 99232 SBSQ HOSP IP/OBS MODERATE 35: CPT | Performed by: INTERNAL MEDICINE

## 2022-10-16 PROCEDURE — 85027 COMPLETE CBC AUTOMATED: CPT

## 2022-10-16 PROCEDURE — 770006 HCHG ROOM/CARE - MED/SURG/GYN SEMI*

## 2022-10-16 PROCEDURE — 83735 ASSAY OF MAGNESIUM: CPT

## 2022-10-16 PROCEDURE — 700102 HCHG RX REV CODE 250 W/ 637 OVERRIDE(OP): Performed by: INTERNAL MEDICINE

## 2022-10-16 PROCEDURE — 97602 WOUND(S) CARE NON-SELECTIVE: CPT

## 2022-10-16 RX ORDER — MAGNESIUM SULFATE HEPTAHYDRATE 40 MG/ML
2 INJECTION, SOLUTION INTRAVENOUS ONCE
Status: COMPLETED | OUTPATIENT
Start: 2022-10-16 | End: 2022-10-16

## 2022-10-16 RX ADMIN — CEFTRIAXONE SODIUM 2 G: 2 INJECTION, POWDER, FOR SOLUTION INTRAMUSCULAR; INTRAVENOUS at 11:26

## 2022-10-16 RX ADMIN — POTASSIUM BICARBONATE 50 MEQ: 978 TABLET, EFFERVESCENT ORAL at 07:47

## 2022-10-16 RX ADMIN — MORPHINE SULFATE 4 MG: 4 INJECTION INTRAVENOUS at 02:39

## 2022-10-16 RX ADMIN — ENOXAPARIN SODIUM 60 MG: 60 INJECTION SUBCUTANEOUS at 05:06

## 2022-10-16 RX ADMIN — ENOXAPARIN SODIUM 60 MG: 60 INJECTION SUBCUTANEOUS at 17:11

## 2022-10-16 RX ADMIN — METRONIDAZOLE 500 MG: 5 INJECTION, SOLUTION INTRAVENOUS at 21:01

## 2022-10-16 RX ADMIN — METRONIDAZOLE 500 MG: 5 INJECTION, SOLUTION INTRAVENOUS at 05:06

## 2022-10-16 RX ADMIN — MORPHINE SULFATE 4 MG: 4 INJECTION INTRAVENOUS at 06:42

## 2022-10-16 RX ADMIN — MORPHINE SULFATE 4 MG: 4 INJECTION INTRAVENOUS at 21:07

## 2022-10-16 RX ADMIN — METRONIDAZOLE 500 MG: 5 INJECTION, SOLUTION INTRAVENOUS at 15:23

## 2022-10-16 RX ADMIN — MORPHINE SULFATE 4 MG: 4 INJECTION INTRAVENOUS at 17:08

## 2022-10-16 RX ADMIN — TRAZODONE HYDROCHLORIDE 50 MG: 50 TABLET ORAL at 21:01

## 2022-10-16 RX ADMIN — SODIUM HYPOCHLORITE 1 ML: 1.25 SOLUTION TOPICAL at 05:06

## 2022-10-16 RX ADMIN — MAGNESIUM SULFATE 2 G: 2 INJECTION INTRAVENOUS at 07:51

## 2022-10-16 RX ADMIN — SODIUM HYPOCHLORITE 10 ML: 1.25 SOLUTION TOPICAL at 16:47

## 2022-10-16 RX ADMIN — MORPHINE SULFATE 4 MG: 4 INJECTION INTRAVENOUS at 11:26

## 2022-10-16 ASSESSMENT — ENCOUNTER SYMPTOMS
WEAKNESS: 0
NAUSEA: 0
VOMITING: 0
DIZZINESS: 0
CHILLS: 0
BACK PAIN: 0
FEVER: 0
ABDOMINAL PAIN: 0

## 2022-10-16 ASSESSMENT — PATIENT HEALTH QUESTIONNAIRE - PHQ9
SUM OF ALL RESPONSES TO PHQ9 QUESTIONS 1 AND 2: 0
2. FEELING DOWN, DEPRESSED, IRRITABLE, OR HOPELESS: NOT AT ALL
1. LITTLE INTEREST OR PLEASURE IN DOING THINGS: NOT AT ALL

## 2022-10-16 ASSESSMENT — PAIN DESCRIPTION - PAIN TYPE
TYPE: ACUTE PAIN

## 2022-10-16 NOTE — PROGRESS NOTES
[facility] Medicine Daily Progress Note    Date of Service  10/16/2022    Chief Complaint  Jordyn Rick is a 57 y.o. female admitted 10/13/2022 with   Chief Complaint   Patient presents with    Post-Op Complications     Wound to abd draining large amount of grey liquid, opened this am, pt reports night sweats x 3 nights     Hospital Course  57-year-old female with a past medical history of T2DM, gout, Bell's palsy admitted on 10/13/22 for worsening abdominal pain onset 3 days, complaining of purulent drainage from her abdominal wound, foul-smelling, associated chills, night sweats, abdominal pain and nausea.  Patient had a recent incarcerated ventral hernia repair with small bowel resection on 9/8/2022, was discharged from hospital on 9/12/2022.  Patient was recommended for admission by general surgery, recommending bowel rest, IV antibiotics, possible TPN, recommending conservative management in hopes enterocutaneous fistula spontaneously closes.  Patient was started on IV ceftriaxone and IV Flagyl.  She has been kept NPO.    Interval Problem Update  Patient states she has improved pain, able to take walks. Had stool today, tolerated clear liquid diet.  Feels her drainage has decreased from admission.  Ongoing drainage.   Mag 1.4  On ceftriaxone 2g and flagyl IV.  Gen Sx recommending clear liquid diet.  Consider ID consultation.  Wound care consulted, has evaluated patient.    I have discussed this patient's plan of care and discharge plan at IDT rounds today with Case Management, Nursing, Nursing leadership, and other members of the IDT team.    Consultants/Specialty  general surgery    Code Status  Full Code    Disposition  Patient is not medically cleared for discharge.   Anticipate discharge to  TBD depending on outcome of fistula resolution and bowel rest or if patient is able to return to PO intake, or else patient will need prolonged TPN .  I have placed the appropriate orders for post-discharge  needs.    Review of Systems  Review of Systems   Constitutional:  Negative for chills and fever.   Gastrointestinal:  Negative for abdominal pain, nausea and vomiting.   Musculoskeletal:  Negative for back pain and joint pain.   Neurological:  Negative for dizziness and weakness.   All other systems reviewed and are negative.     Physical Exam  Temp:  [36.4 °C (97.5 °F)-36.9 °C (98.5 °F)] 36.8 °C (98.2 °F)  Pulse:  [73-86] 77  Resp:  [16-18] 16  BP: (104-115)/(55-73) 115/73  SpO2:  [94 %-96 %] 95 %    Physical Exam  Vitals and nursing note reviewed.   Constitutional:       Appearance: She is obese. She is not ill-appearing.   HENT:      Mouth/Throat:      Mouth: Mucous membranes are dry.      Pharynx: No oropharyngeal exudate.   Cardiovascular:      Rate and Rhythm: Normal rate.      Pulses: Normal pulses.      Heart sounds: No murmur heard.     Comments: Difficult to completely auscultate due to body habitus  Pulmonary:      Effort: Pulmonary effort is normal.      Breath sounds: Normal breath sounds.   Abdominal:      General: Bowel sounds are normal. There is no distension.      Comments: Abdominal open wounds x3, discharge still present, ostomy bag to collect fluid.   Musculoskeletal:         General: No swelling or tenderness. Normal range of motion.   Skin:     General: Skin is warm.      Coloration: Skin is not jaundiced.      Findings: No erythema.   Neurological:      Mental Status: She is alert and oriented to person, place, and time. Mental status is at baseline.      Motor: No weakness.   Psychiatric:         Mood and Affect: Mood normal.         Behavior: Behavior normal.         Thought Content: Thought content normal.       Fluids    Intake/Output Summary (Last 24 hours) at 10/16/2022 1216  Last data filed at 10/16/2022 0800  Gross per 24 hour   Intake 1080 ml   Output 40 ml   Net 1040 ml       Laboratory  Recent Labs     10/14/22  0356 10/15/22  0506 10/16/22  0312   WBC 10.3 6.3 5.7   RBC 3.92*  3.58* 3.71*   HEMOGLOBIN 9.4* 8.4* 8.7*   HEMATOCRIT 30.2* 27.9* 29.3*   MCV 77.0* 77.9* 79.0*   MCH 24.0* 23.5* 23.5*   MCHC 31.1* 30.1* 29.7*   RDW 49.3 50.1* 51.0*   PLATELETCT 466* 449* 463*   MPV 9.0 8.8* 9.2     Recent Labs     10/14/22  0356 10/15/22  0506 10/16/22  0312   SODIUM 133* 139 136   POTASSIUM 3.6 3.9 3.3*   CHLORIDE 100 102 101   CO2 19* 24 20   GLUCOSE 90 119* 120*   BUN 6* 7* 8   CREATININE 0.46* 0.41* 0.44*   CALCIUM 8.8 8.7 8.2*                     Imaging  CT-ABDOMEN-PELVIS WITH   Final Result      1.  Edema/infiltrative fluid within the mesentery anteriorly deep to anterior abdominal wall incision without loculated fluid collection identified.      2.  Edema and phlegmonous change within the subcutaneous tissue with apparent small amount of fluid within the midline incision.      3.  Small foci of gas within the subcutaneous tissue and in the inflamed mesentery likely postoperative in nature as there is no evidence of contrast extravasation or intra-abdominal abscess to suggest bowel perforation.      4.  Hepatic steatosis.      5.  Nonspecific left adrenal gland nodule.           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    WBC normalized  -General Surgery following.  recommending clear liquid diet.  Considering this may be an abscess-cutaneous fistula and not enterocutaneous fistula.  -IV abx On ceftriaxone 2g and flagyl IV, cultures not obtained in ED upon arrival.  -wound care   -pain control   Consider ID consultation for duration.    Reactive thrombocytosis- (present on admission)  Assessment & Plan  483  on admission, reactive to current infection.  Remaining [id number]    Hypomagnesemia  Assessment & Plan  Down to 1.4  Replacing via IV again  Recheck lab daily    Discharge planning issues- (present on admission)  Assessment & Plan  TBD depending on outcome of fistula resolution and bowel rest or if patient is able to return to PO intake, or else patient will need prolonged TPN    Sepsis (HCC)- (present on admission)  Assessment & Plan  This is Sepsis Present on admission  Now improved    Rocephin and flagyl   Cultures pending   Surgery consulted, no abscess on CT   Plan as above    Hypokalemia- (present on admission)  Assessment & Plan  3.3  Replacing orally  Replacing mag  Recheck labs daily    Morbid obesity with BMI of 50.0-59.9, adult ([facility])- (present on admission)  Assessment & Plan  Noted, patient has been working on weight loss at home   Lost 35 lbs prior to previous admission   Counseling when appropriate    Primary hypertension- (present on admission)  Assessment & Plan  PRN IV antihypertensives    DM (diabetes mellitus) ([facility])- (present on admission)  Assessment & Plan  ISS with hypoglycemic protocol  A1c 6.9, controlled  Glucose levels lower 90 this morning from 191.  Hold all PO home meds.     VTE prophylaxis: enoxaparin ppx    I have performed a physical exam and reviewed and updated ROS and Plan today (10/16/2022). In review of yesterday's note (10/15/2022), there are no changes except as documented above.

## 2022-10-16 NOTE — CARE PLAN
The patient is Stable - Low risk of patient condition declining or worsening    Shift Goals  Clinical Goals: wound care, antibiotics  Patient Goals: ambulate    Progress made toward(s) clinical / shift goals:    Problem: Knowledge Deficit - Standard  Goal: Patient and family/care givers will demonstrate understanding of plan of care, disease process/condition, diagnostic tests and medications  Outcome: Progressing     Problem: Hemodynamics  Goal: Patient's hemodynamics, fluid balance and neurologic status will be stable or improve  Outcome: Progressing     Problem: Pain - Standard  Goal: Alleviation of pain or a reduction in pain to the patient’s comfort goal  Outcome: Progressing       Patient is not progressing towards the following goals:

## 2022-10-16 NOTE — PROGRESS NOTES
"Pt upright in bed having meal, calm, cooperative, pleasant affect. RN reviewed POC which is for continued abx tx, wound care per orders. RN encouraged CDB w/ \"I.S.\" will reinforce t/o day. Fall and safety precautions in place, pt stated understanding to use call light for all ADL needs. Pt is independent w/ turns for skin integrity. Pt has Lovenox per MAR for VTE, hourly rounds ongoing, call light w/in reach.   "

## 2022-10-16 NOTE — CARE PLAN
The patient is Stable - Low risk of patient condition declining or worsening    Shift Goals  Clinical Goals: Wound care  Patient Goals: Ambulate 3 times this shift    Progress made toward(s) clinical / shift goals:  Wound care completed this shift.     Problem: Knowledge Deficit - Standard  Goal: Patient and family/care givers will demonstrate understanding of plan of care, disease process/condition, diagnostic tests and medications  Outcome: Progressing     Problem: Pain - Standard  Goal: Alleviation of pain or a reduction in pain to the patient’s comfort goal  Outcome: Progressing     Problem: Skin Integrity  Goal: Skin integrity is maintained or improved  Outcome: Progressing       Patient is not progressing towards the following goals:

## 2022-10-16 NOTE — CARE PLAN
The patient is Stable - Low risk of patient condition declining or worsening    Shift Goals pt will 4 4 x/ per shift  Clinical Goals: wound care, antibiotics  Patient Goals: ambulate    Progress made toward(s) clinical / shift goals:  has been ambulating per plan. GS Dr Barr has seen pt w/ discharge plans, possibly Monday    Patient is not progressing towards the following goals:

## 2022-10-16 NOTE — PROGRESS NOTES
"  DATE: 10/16/2022      INTERVAL EVENTS:  Started on clear liquids  Drainage from abdominal wound 40cc purulent    Patient is having less mid abdominal pain, overall is feeling better      PHYSICAL EXAMINATION:      Vital Signs: /73   Pulse 77   Temp 36.8 °C (98.2 °F) (Oral)   Resp 16   Ht 1.549 m (5' 1\")   Wt (!) 126 kg (277 lb 12.5 oz)   SpO2 95%   Physical Exam    LABORATORY VALUES:   Recent Labs     10/14/22  0356 10/15/22  0506 10/16/22  0312   WBC 10.3 6.3 5.7   RBC 3.92* 3.58* 3.71*   HEMOGLOBIN 9.4* 8.4* 8.7*   HEMATOCRIT 30.2* 27.9* 29.3*   MCV 77.0* 77.9* 79.0*   MCH 24.0* 23.5* 23.5*   MCHC 31.1* 30.1* 29.7*   RDW 49.3 50.1* 51.0*   PLATELETCT 466* 449* 463*   MPV 9.0 8.8* 9.2     Recent Labs     10/14/22  0356 10/15/22  0506 10/16/22  0312   SODIUM 133* 139 136   POTASSIUM 3.6 3.9 3.3*   CHLORIDE 100 102 101   CO2 19* 24 20   GLUCOSE 90 119* 120*   BUN 6* 7* 8   CREATININE 0.46* 0.41* 0.44*   CALCIUM 8.8 8.7 8.2*     Recent Labs     10/14/22  0356   ASTSGOT 15   ALTSGPT 10   TBILIRUBIN 0.2   ALKPHOSPHAT 82   GLOBULIN 4.5*            IMAGING:   CT-ABDOMEN-PELVIS WITH   Final Result      1.  Edema/infiltrative fluid within the mesentery anteriorly deep to anterior abdominal wall incision without loculated fluid collection identified.      2.  Edema and phlegmonous change within the subcutaneous tissue with apparent small amount of fluid within the midline incision.      3.  Small foci of gas within the subcutaneous tissue and in the inflamed mesentery likely postoperative in nature as there is no evidence of contrast extravasation or intra-abdominal abscess to suggest bowel perforation.      4.  Hepatic steatosis.      5.  Nonspecific left adrenal gland nodule.          ASSESSMENT AND PLAN:   No enteric drainage from wound to suggest persistent EC fistula. Drainage controlled. No sign of sepsis  CPM  -IV antibiotics  -Okay to advance to low residue diet  Surgery will follow              " ____________________________________     Bambi JUNAID Barr M.D.    DD: 10/16/2022  3:34 PM

## 2022-10-17 LAB
ALBUMIN SERPL BCP-MCNC: 2.5 G/DL (ref 3.2–4.9)
BUN SERPL-MCNC: 7 MG/DL (ref 8–22)
CALCIUM SERPL-MCNC: 8.4 MG/DL (ref 8.4–10.2)
CHLORIDE SERPL-SCNC: 101 MMOL/L (ref 96–112)
CO2 SERPL-SCNC: 21 MMOL/L (ref 20–33)
CREAT SERPL-MCNC: 0.4 MG/DL (ref 0.5–1.4)
GFR SERPLBLD CREATININE-BSD FMLA CKD-EPI: 116 ML/MIN/1.73 M 2
GLUCOSE BLD STRIP.AUTO-MCNC: 117 MG/DL (ref 65–99)
GLUCOSE BLD STRIP.AUTO-MCNC: 118 MG/DL (ref 65–99)
GLUCOSE BLD STRIP.AUTO-MCNC: 180 MG/DL (ref 65–99)
GLUCOSE SERPL-MCNC: 100 MG/DL (ref 65–99)
MAGNESIUM SERPL-MCNC: 1.8 MG/DL (ref 1.5–2.5)
PHOSPHATE SERPL-MCNC: 3.8 MG/DL (ref 2.5–4.5)
POTASSIUM SERPL-SCNC: 3.8 MMOL/L (ref 3.6–5.5)
SODIUM SERPL-SCNC: 137 MMOL/L (ref 135–145)

## 2022-10-17 PROCEDURE — 99232 SBSQ HOSP IP/OBS MODERATE 35: CPT | Performed by: INTERNAL MEDICINE

## 2022-10-17 PROCEDURE — 770006 HCHG ROOM/CARE - MED/SURG/GYN SEMI*

## 2022-10-17 PROCEDURE — 83735 ASSAY OF MAGNESIUM: CPT

## 2022-10-17 PROCEDURE — 94760 N-INVAS EAR/PLS OXIMETRY 1: CPT

## 2022-10-17 PROCEDURE — 700101 HCHG RX REV CODE 250: Performed by: INTERNAL MEDICINE

## 2022-10-17 PROCEDURE — 700102 HCHG RX REV CODE 250 W/ 637 OVERRIDE(OP): Performed by: INTERNAL MEDICINE

## 2022-10-17 PROCEDURE — 80069 RENAL FUNCTION PANEL: CPT

## 2022-10-17 PROCEDURE — 700111 HCHG RX REV CODE 636 W/ 250 OVERRIDE (IP): Performed by: INTERNAL MEDICINE

## 2022-10-17 PROCEDURE — A9270 NON-COVERED ITEM OR SERVICE: HCPCS | Performed by: INTERNAL MEDICINE

## 2022-10-17 PROCEDURE — 82962 GLUCOSE BLOOD TEST: CPT | Mod: 91

## 2022-10-17 PROCEDURE — 36415 COLL VENOUS BLD VENIPUNCTURE: CPT

## 2022-10-17 RX ORDER — AMOXICILLIN AND CLAVULANATE POTASSIUM 875; 125 MG/1; MG/1
1 TABLET, FILM COATED ORAL EVERY 12 HOURS
Status: DISCONTINUED | OUTPATIENT
Start: 2022-10-17 | End: 2022-10-19 | Stop reason: HOSPADM

## 2022-10-17 RX ADMIN — SODIUM HYPOCHLORITE 1 ML: 1.25 SOLUTION TOPICAL at 18:00

## 2022-10-17 RX ADMIN — MORPHINE SULFATE 4 MG: 4 INJECTION INTRAVENOUS at 22:11

## 2022-10-17 RX ADMIN — AMOXICILLIN AND CLAVULANATE POTASSIUM 1 TABLET: 875; 125 TABLET, FILM COATED ORAL at 09:18

## 2022-10-17 RX ADMIN — TRAZODONE HYDROCHLORIDE 50 MG: 50 TABLET ORAL at 21:07

## 2022-10-17 RX ADMIN — METRONIDAZOLE 500 MG: 5 INJECTION, SOLUTION INTRAVENOUS at 05:03

## 2022-10-17 RX ADMIN — ENOXAPARIN SODIUM 60 MG: 60 INJECTION SUBCUTANEOUS at 17:43

## 2022-10-17 RX ADMIN — AMOXICILLIN AND CLAVULANATE POTASSIUM 1 TABLET: 875; 125 TABLET, FILM COATED ORAL at 17:42

## 2022-10-17 RX ADMIN — MORPHINE SULFATE 4 MG: 4 INJECTION INTRAVENOUS at 04:38

## 2022-10-17 RX ADMIN — INSULIN HUMAN 1 UNITS: 100 INJECTION, SOLUTION PARENTERAL at 18:04

## 2022-10-17 RX ADMIN — SODIUM HYPOCHLORITE 1 ML: 1.25 SOLUTION TOPICAL at 05:03

## 2022-10-17 RX ADMIN — METFORMIN HYDROCHLORIDE 1000 MG: 500 TABLET ORAL at 17:42

## 2022-10-17 RX ADMIN — MORPHINE SULFATE 4 MG: 4 INJECTION INTRAVENOUS at 09:18

## 2022-10-17 RX ADMIN — MORPHINE SULFATE 4 MG: 4 INJECTION INTRAVENOUS at 18:04

## 2022-10-17 RX ADMIN — MORPHINE SULFATE 4 MG: 4 INJECTION INTRAVENOUS at 14:00

## 2022-10-17 RX ADMIN — ENOXAPARIN SODIUM 60 MG: 60 INJECTION SUBCUTANEOUS at 05:03

## 2022-10-17 ASSESSMENT — PAIN DESCRIPTION - PAIN TYPE
TYPE: ACUTE PAIN

## 2022-10-17 ASSESSMENT — ENCOUNTER SYMPTOMS
BACK PAIN: 0
CHILLS: 0
NAUSEA: 0
VOMITING: 0
WEAKNESS: 0
FEVER: 0
ABDOMINAL PAIN: 0
DIZZINESS: 0

## 2022-10-17 NOTE — PROGRESS NOTES
Hospital Medicine Daily Progress Note    Date of Service  10/17/2022    Chief Complaint  Jordyn Rick is a 57 y.o. female admitted 10/13/2022 with   Chief Complaint   Patient presents with    Post-Op Complications     Wound to abd draining large amount of grey liquid, opened this am, pt reports night sweats x 3 nights     Hospital Course  57-year-old female with a past medical history of T2DM, gout, Bell's palsy admitted on 10/13/22 for worsening abdominal pain onset 3 days, complaining of purulent drainage from her abdominal wound, foul-smelling, associated chills, night sweats, abdominal pain and nausea.  Patient had a recent incarcerated ventral hernia repair with small bowel resection on 9/8/2022, was discharged from hospital on 9/12/2022.  Patient was recommended for admission by general surgery, recommending bowel rest, IV antibiotics, possible TPN, recommending conservative management in hopes enterocutaneous fistula spontaneously closes.  Patient was started on IV ceftriaxone and IV Flagyl.  She has been kept NPO.    Interval Problem Update  Patient states she was doing well today, tolerating her diet, having BM.  Mag 1.8  On ceftriaxone 2g and flagyl IV.  Gen Sx recommending GI soft diet advancement.  Wound care consulted, has evaluated patient.  Tried to get HH, but she has Medicaid and unable to obtain. Placed referral for wound care clinic.    I have discussed this patient's plan of care and discharge plan at IDT rounds today with Case Management, Nursing, Nursing leadership, and other members of the IDT team.    Consultants/Specialty  general surgery    Code Status  Full Code    Disposition  Patient is not medically cleared for discharge.   Anticipate discharge to to home with close outpatient follow-up.  I have placed the appropriate orders for post-discharge needs.    Review of Systems  Review of Systems   Constitutional:  Negative for chills and fever.   Gastrointestinal:  Negative for abdominal pain,  nausea and vomiting.   Musculoskeletal:  Negative for back pain and joint pain.   Neurological:  Negative for dizziness and weakness.   All other systems reviewed and are negative.     Physical Exam  Temp:  [36.6 °C (97.9 °F)-36.9 °C (98.4 °F)] 36.8 °C (98.3 °F)  Pulse:  [70-89] 89  Resp:  [16-18] 18  BP: (101-111)/(49-62) 111/61  SpO2:  [94 %-97 %] 95 %    Physical Exam  Vitals and nursing note reviewed.   Constitutional:       Appearance: She is obese. She is not ill-appearing.   HENT:      Mouth/Throat:      Mouth: Mucous membranes are dry.      Pharynx: No oropharyngeal exudate.   Cardiovascular:      Rate and Rhythm: Normal rate.      Pulses: Normal pulses.      Heart sounds: No murmur heard.     Comments: Difficult to completely auscultate due to body habitus  Pulmonary:      Effort: Pulmonary effort is normal.      Breath sounds: Normal breath sounds.   Abdominal:      General: Bowel sounds are normal. There is no distension.      Comments: Abdominal open wounds x3, discharge still present, ostomy bag to collect fluid.   Musculoskeletal:         General: No swelling or tenderness. Normal range of motion.   Skin:     General: Skin is warm.      Coloration: Skin is not jaundiced.      Findings: No erythema.   Neurological:      Mental Status: She is alert and oriented to person, place, and time. Mental status is at baseline.      Motor: No weakness.   Psychiatric:         Mood and Affect: Mood normal.         Behavior: Behavior normal.         Thought Content: Thought content normal.     Fluids    Intake/Output Summary (Last 24 hours) at 10/17/2022 1448  Last data filed at 10/17/2022 0000  Gross per 24 hour   Intake 1038 ml   Output 0 ml   Net 1038 ml       Laboratory  Recent Labs     10/15/22  0506 10/16/22  0312   WBC 6.3 5.7   RBC 3.58* 3.71*   HEMOGLOBIN 8.4* 8.7*   HEMATOCRIT 27.9* 29.3*   MCV 77.9* 79.0*   MCH 23.5* 23.5*   MCHC 30.1* 29.7*   RDW 50.1* 51.0*   PLATELETCT 449* 463*   MPV 8.8* 9.2      Recent Labs     10/15/22  0506 10/16/22  0312 10/17/22  0244   SODIUM 139 136 137   POTASSIUM 3.9 3.3* 3.8   CHLORIDE 102 101 101   CO2 24 20 21   GLUCOSE 119* 120* 100*   BUN 7* 8 7*   CREATININE 0.41* 0.44* 0.40*   CALCIUM 8.7 8.2* 8.4                     Imaging  CT-ABDOMEN-PELVIS WITH   Final Result      1.  Edema/infiltrative fluid within the mesentery anteriorly deep to anterior abdominal wall incision without loculated fluid collection identified.      2.  Edema and phlegmonous change within the subcutaneous tissue with apparent small amount of fluid within the midline incision.      3.  Small foci of gas within the subcutaneous tissue and in the inflamed mesentery likely postoperative in nature as there is no evidence of contrast extravasation or intra-abdominal abscess to suggest bowel perforation.      4.  Hepatic steatosis.      5.  Nonspecific left adrenal gland nodule.           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    WBC normalized  -General Surgery following.  recommending clear liquid diet.  Considering this may be an abscess-cutaneous fistula and not enterocutaneous fistula.  -IV abx On ceftriaxone 2g and flagyl IV, cultures not obtained in ED upon arrival.  -wound care   -pain control     Reactive thrombocytosis- (present on admission)  Assessment & Plan  483 on admission, reactive to current infection.  Remaining 440-480    Hypomagnesemia  Assessment & Plan  Down to 1.4  1.8  Recheck lab daily    Discharge planning issues- (present on admission)  Assessment & Plan  Tried to get HH, but she has Medicaid and  unable to obtain. Placed referral for wound care clinic.    Sepsis ([facility])- (present on admission)  Assessment & Plan  This is Sepsis Present on admission  Now improved  Switching Unasyn to Augmentin    Hypokalemia- (present on admission)  Assessment & Plan  3.8, improved after restarting diet.    Morbid obesity with BMI of 50.0-59.9, adult ([facility])- (present on admission)  Assessment & Plan  Noted, patient has been working on weight loss at home   Lost 35 lbs prior to previous admission   Counseling when appropriate    Primary hypertension- (present on admission)  Assessment & Plan  PRN IV antihypertensives    DM (diabetes mellitus) ([facility])- (present on admission)  Assessment & Plan  ISS with hypoglycemic protocol  A1c 6.9, controlled  Glucose levels lower 90 this morning from 191.  Hold all PO home meds.     VTE prophylaxis: enoxaparin ppx    I have performed a physical exam and reviewed and updated ROS and Plan today (10/17/2022). In review of yesterday's note (10/16/2022), there are no changes except as documented above.

## 2022-10-17 NOTE — DISCHARGE PLANNING
Received Choice form at 1219  Agency/Facility Name: [facility]  Referral sent per Choice form @ 1230    1528-  [facility]Fac[facility] Name: [facility]  Outcome: DPA left v-mail for Bushra requesting update on referral.    1529-  Agency[facility]Facility Name: [facility]  Spoke To: Nevin  Outcome: DPA requested update on referral, informed that referral is still in review.     RN JASON notified.

## 2022-10-17 NOTE — PROGRESS NOTES
Surgical Progress Note    Author: Daniel Horn M.D. Date & Time created: 10/17/2022   10:22 AM     Interval Events:  58 y/o female 6 week ss/p incarcerated VHR with small bowel resection for ischemia, now in hospital over past few days for concern for EC fistula through infected/dehisced incision. Wound drainage divide attached to wound now, no fevers, scant drainage      ROS  Hemodynamics:  Temp (24hrs), Av.8 °C (98.2 °F), Min:36.6 °C (97.9 °F), Max:36.9 °C (98.4 °F)  Temperature: 36.9 °C (98.4 °F)  Pulse  Av.9  Min: 70  Max: 124   Blood Pressure: 104/52     Respiratory:    Respiration: 16, Pulse Oximetry: 95 %           Neuro:  GCS       Fluids:    Intake/Output Summary (Last 24 hours) at 10/17/2022 1022  Last data filed at 10/17/2022 0000  Gross per 24 hour   Intake 1398 ml   Output 0 ml   Net 1398 ml       Current Diet Order   Procedures    Diet Order Diet: Clear Liquid (Give Boost protein shake with every meal); Miscellaneous modifications: (optional): No Red, CHO Free     Physical Exam  Appears well  Neck supple  Regular heart rate  Normal respiratory effort  Abdomen soft, NT, midline wound drainage collection divide in place, clean and intact with scant SSG drainage  Ext ROM grossly intact  Skin pink and warm        Labs:  Recent Results (from the past 24 hour(s))   POCT glucose device results    Collection Time: 10/16/22 11:28 AM   Result Value Ref Range    POC Glucose, Blood 145 (H) 65 - 99 mg/dL   POCT glucose device results    Collection Time: 10/16/22  5:15 PM   Result Value Ref Range    POC Glucose, Blood 112 (H) 65 - 99 mg/dL   POCT glucose device results    Collection Time: 10/16/22 11:07 PM   Result Value Ref Range    POC Glucose, Blood 100 (H) 65 - 99 mg/dL   Renal Function Panel    Collection Time: 10/17/22  2:44 AM   Result Value Ref Range    Sodium 137 135 - 145 mmol/L    Potassium 3.8 3.6 - 5.5 mmol/L    Chloride 101 96 - 112 mmol/L    Co2 21 20 - 33 mmol/L    Glucose 100 (H)  65 - 99 mg/dL    Creatinine 0.40 (L) 0.50 - 1.40 mg/dL    Bun 7 (L) 8 - 22 mg/dL    Calcium 8.4 8.4 - 10.2 mg/dL    Phosphorus 3.8 2.5 - 4.5 mg/dL    Albumin 2.5 (L) 3.2 - 4.9 g/dL   MAGNESIUM    Collection Time: 10/17/22  2:44 AM   Result Value Ref Range    Magnesium 1.8 1.5 - 2.5 mg/dL   ESTIMATED GFR    Collection Time: 10/17/22  2:44 AM   Result Value Ref Range    GFR (CKD-EPI) 116 >60 mL/min/1.73 m 2   POCT glucose device results    Collection Time: 10/17/22  5:08 AM   Result Value Ref Range    POC Glucose, Blood 117 (H) 65 - 99 mg/dL     Medical Decision Making, by Problem:  Active Hospital Problems    Diagnosis     Reactive thrombocytosis [D75.838]     Discharge planning issues [Z02.9]     Hypomagnesemia [E83.42]     Enterocutaneous fistula [K63.2]     Sepsis ([facility]) [A41.9]     Primary hypertension [I10]     Hypokalemia [E87.6]     Morbid obesity with BMI of 50.0-59.9, adult ([facility]) [E66.01, Z68.43]     DM (diabetes mellitus) ([facility]) [E11.9]      Plan:  58 y/o female 6 weeks s/p open incarcerated ventral hernia repair with small bowel resection, now admitted with midline wound dehiscence and recent concern for enterocutaneous fistula. Wound evolution and ongoing drainage suggestive of simple wound infection rather than EC fistual  Appreciate medical care  Abx full duration for wound infection  ADAT to GI soft  Strict I/Os from wound collection device  Dressing applied this am, plan to take down and inspect wuond tomorrow after output measured  Will follow closely along with you    Quality Measures:  Quality-Core Measures    Discussed patient condition with Patient

## 2022-10-17 NOTE — CARE PLAN
The patient is Stable - Low risk of patient condition declining or worsening    Shift Goals  Clinical Goals: pt will remain at stated comfort goal of 4/10 pain this shift. Dressing will be changed this shift.  Patient Goals: sleep comfortably  Family Goals: no family present    Progress made toward(s) clinical / shift goals:      PRN morphine allows pt to reach comfort goal.     Dressing changed at 0445.    Problem: Knowledge Deficit - Standard  Goal: Patient and family/care givers will demonstrate understanding of plan of care, disease process/condition, diagnostic tests and medications  Outcome: Progressing     Problem: Hemodynamics  Goal: Patient's hemodynamics, fluid balance and neurologic status will be stable or improve  Outcome: Progressing     Problem: Fluid Volume  Goal: Fluid volume balance will be maintained  Outcome: Progressing     Problem: Pain - Standard  Goal: Alleviation of pain or a reduction in pain to the patient’s comfort goal  Outcome: Progressing     Problem: Skin Integrity  Goal: Skin integrity is maintained or improved  Outcome: Progressing       Patient is not progressing towards the following goals:

## 2022-10-17 NOTE — DISCHARGE PLANNING
Case Management Discharge Planning    Admission Date: 10/13/2022  GMLOS: 3.5  ALOS: 4    6-Clicks ADL Score: 23  6-Clicks Mobility Score: 24      Anticipated Discharge Dispo: Discharge Disposition: D/T to home under HHA care in anticipation of covered skilled care (06)    DME Needed: No    Action(s) Taken:     Spoke to Bushra from [facility]. Per Bushra they accept [facility] Medicaid on a case-by-case basis.     RNCM spoke to pt. Received verbal approval for [facility]. RNCM explained to pt that [facility] may decline due to insurance and HH with Medicaid is not common. Pt provided RNCM with phone number for HPN  Elsa [phone]. RNCM called Elsa in attempt to facilitate auth process, no answer, left VM with call back number. Choice from completed and faxed to [facility].    Received call back from Elsa. Per Elsa, HH may be difficult and unlikely to obtain. Elsa to reach out to HPN  Emy López for assistance with case.    Received call from Emy López. Per Emy, she will approve auth for [facility] if they are able to accept pt.    Spoke to Bushra from [facility] via phone call. [facility] to review referral.    Escalations Completed: None    Medically Clear: No    Next Steps: f/u with [facility] regarding acceptance    Barriers to Discharge: Medical clearance

## 2022-10-17 NOTE — PROGRESS NOTES
Pt is awake in bed. Pt c/o 3/10 abdominal pain. Dressing to abdomen is clean, dry, and intact. Pt is requesting to rest. Fall precautions in place.

## 2022-10-17 NOTE — CARE PLAN
The patient is Stable - Low risk of patient condition declining or worsening    Shift Goals  Clinical Goals: Pt will maintain a pain level of 5/10 throughout shift  Patient Goals: sleep comfortably  Family Goals: no family present    Progress made toward(s) clinical / shift goals:  pt hs maintained a pain level of 5/10 or less throughout shift, progressing on other goals    Patient is not progressing towards the following goals:

## 2022-10-17 NOTE — WOUND TEAM
"[facility]  Inpatient Services  Wound and Skin Care Evaluation    Admission Date: 10/13/2022     Last order of IP CONSULT TO WOUND CARE was found on 10/13/2022 from Hospital Encounter on 10/13/2022     HPI, PMH, SH: Reviewed    Past Surgical History:   Procedure Laterality Date    VENTRAL HERNIA REPAIR N/A 9/8/2022    Procedure: OPEN INCARCERATED VENTRAL HERNIA REPAIR, SMALL BOWEL RESECTION;  Surgeon: Myranda Pepper M.D.;  Location: SURGERY [facility];  Service: General     Social History     Tobacco Use    Smoking status: Never    Smokeless tobacco: Never   Substance Use Topics    Alcohol use: Yes     Comment: twice a month     Chief Complaint   Patient presents with    Post-Op Complications     Wound to abd draining large amount of grey liquid, opened this am, pt reports night sweats x 3 nights     Diagnosis: Enterocutaneous fistula [K63.2]    Unit where seen by Wound Team: 2214/02     WOUND CONSULT/FOLLOW UP RELATED TO:  abdomen    WOUND HISTORY:  Per Dr. Grijalav \"This is a 57 y.o. female with PMH significant for morbid obesity and DM II who is s/p incarcerated ventral hernia repair on 9/8/22, which was complicated by PO wound infection. Unfortubatey today she woke up with fevers and chills, and increasing wound drainage that was grey and foul smelling.  She denies fevers, chills, nausea or vomiting or any other changes in her BMs. She does endorse incisional pain.\"    WOUND ASSESSMENT/LDA       Wound 10/13/22 Fistula Umbilicus;Abdomen Mid;Lower x3 open areas (Active)        10/14/22 1600   Site Assessment Red;Pink    Periwound Assessment Clean;Dry;Intact;Scar tissue    Margins Defined edges    Closure Secondary intention    Drainage Amount Small    Drainage Description Serosanguineous;Tan    Treatments Cleansed    Wound Cleansing Normal Saline Irrigation    Periwound Protectant Skin Protectant Wipes to Periwound;Paste Ring    Dressing Cleansing/Solutions 1/4 Strength Dakin's Solution  "   Dressing Options Wound Manager;Pink Ostomy Tape    Dressing Changed Changed    Dressing Status Intact    Dressing Change/Treatment Frequency Every Shift, and As Needed    NEXT Dressing Change/Treatment Date 10/16/22    NEXT Weekly Photo (Inpatient Only) 10/21/22    Non-staged Wound Description Full thickness 10/16/22 1700   Wound Length (cm) 11 cm 10/14/22 1600   Wound Width (cm) 3 cm 10/14/22 1600   Wound Surface Area (cm^2) 33 cm^2 10/14/22 1600   Shape circular x 3    Wound Odor None    Exposed Structures Sutures;SHIRA           Vascular:    TIGRE:   No results found.    Lab Values:    Lab Results   Component Value Date/Time    WBC 5.7 10/16/2022 03:12 AM    RBC 3.71 (L) 10/16/2022 03:12 AM    HEMOGLOBIN 8.7 (L) 10/16/2022 03:12 AM    HEMATOCRIT 29.3 (L) 10/16/2022 03:12 AM    HBA1C 6.9 (H) 10/14/2022 03:56 AM        Culture Results show:  No results found for this or any previous visit (from the past 720 hour(s)).    Pain Level/Medicated:  Received IV morphine during wound care    INTERVENTIONS BY WOUND TEAM:  Chart and images reviewed. Discussed with bedside RN. All areas of concern (based on picture review, LDA review and discussion with bedside RN) have been thoroughly assessed. Documentation of areas based on significant findings. This RN in to assess patient. Performed standard wound care which includes appropriate positioning, dressing removal and non-selective debridement. Pictures and measurements obtained weekly if/when required.  Preparation for Dressing removal: Removed without difficulty  Non-selectively Debrided with:  NS and gauze.  Sharp debridement: NA  Paulette wound: Cleansed with NS, Prepped with no sting    PROXIMAL AND DISTAL OPENINGS  Primary Dressin/4 strength dakin's moistened gauze   Secondary (Outer) Dressing: paste ring, large wound manager, pink tape      Interdisciplinary consultation: Patient, Bedside RN     EVALUATION / RATIONALE FOR TREATMENT:  Most Recent Date:  10/16: Pt's wound  manager leaking and compromised distal drsg. Small liquid tan serosanguinous drainage. Pt now having clear liquids. Drainage doesn't appear to have increased much. No immediate drainage increase with fluid intake. There is a suture present to distal wound with the skin bridge. Larger wound manager placed to incorporate both wounds. If no EC fistula might be able to pouch proximal wound with regular drsg over distal wound.     10/14/22: Patient with enterocutaneous fistula to abdomen with x3 open areas. Small brown tinged, SS drainage mostly from proximal opening during wound care. Able to lightly probe proximal opening about 1.5cm. Distal open areas more shallow, communicating underneath with skin bridge in between. Wound manager applied to manage drainage and protect surrounding intact skin. Will use larger wound manager on next change to address all three openings.     Goals: Steady decrease in wound area and depth weekly.    WOUND TEAM PLAN OF CARE ([X] for frequency of wound follow up,):   Nursing to follow dressing orders written for wound care. Contact wound team if area fails to progress, deteriorates or with any questions/concerns if something comes up before next scheduled follow up (See below as to whether wound is following and frequency of wound follow up)  Dressing changes by wound team:                   Follow up 3 times weekly:                NPWT change 3 times weekly:     Follow up 1-2 times weekly:    X  Follow up Bi-Monthly:           Follow up Monthly (High Risk):                        Follow up as needed:     Other (explain):     NURSING PLAN OF CARE ORDERS (X):  Dressing changes: See Dressing Care orders: X  Skin care: See Skin Care orders:   RN Prevention Protocol:   Rectal tube care: See Rectal Tube Care orders:   Other orders:    RSKIN:   CURRENTLY IN PLACE (X), APPLIED THIS VISIT (A), ORDERED (O):   Q shift Michael:  X  Q shift pressure point assessments:  X    Surface/Positioning    Pressure redistribution mattress     X       Low Airloss          ICU Low Airloss   Bariatric LIBIA     Waffle cushion        Waffle Overlay          Reposition q 2 hours    pt performs  TAPs Turning system     Z Fabian Pillow     Offloading/Redistribution not assessed  Sacral Mepilex (Silicone dressing)     Heel Mepilex (Silicone dressing)         Heel float boots (Prevalon boot)             Float Heels off Bed with Pillows           Respiratory RA  Silicone O2 tubing         Gray Foam Ear protectors     Cannula fixation Device (Tender )          High flow offloading Clip    Elastic head band offloading device      Anchorfast                                                         Trach with Optifoam split foam             Containment/Moisture Prevention pt is continent  Rectal tube or BMS    Purwick/Condom Cath        Antonio Catheter    Barrier wipes           Barrier paste       Antifungal tx      Interdry        Mobilization   Up to chair        Ambulate    X  PT/OT      Nutrition     Dietician        Diabetes Education      PO  x clears   TF     TPN    NPO    # days     Other        Anticipated discharge plans: TBD  LTACH:        SNF/Rehab:                  Home Health Care:           Outpatient Wound Center:            Self/Family Care:        Other:                  Vac Discharge Needs:   Not Applicable Pt not on a wound vac:     X  Regular Vac while inpatient, alternative dressing at DC:        Regular Vac in use and continued at DC:            Reg. Vac w/ Skin Sub/Biologic in use. Will need to be changed 2x wkly:      Veraflo Vac while inpatient, ok to transition to Regular Vac on Discharge:           Veraflo Vac while inpatient, will need to remain on Veraflo Vac upon discharge:

## 2022-10-17 NOTE — FACE TO FACE
Face to Face Supporting Documentation - Home Health    The encounter with this patient was in whole or in part the primary reason for home health admission.    Date of encounter:   Patient:                    MRN:                       YOB: 2021  Jordyn Rick  [id number]  1965     Home health to see patient for:  Skilled Nursing care for assessment, interventions & education and Wound Care    Skilled need for:  Surgical Aftercare ventral hernia repair and New Onset Medical Diagnosis abscess with cutaneous fistula and open drainage    Skilled nursing interventions to include:  Wound Care    Homebound status evidenced by:  Need the aid of supportive devices such as crutches, canes, wheelchairs or walkers or Needs the assistance of another person in order to leave the home. Leaving home requires a considerable and taxing effort. There is a normal inability to leave the home.    Community Physician to provide follow up care: Swapna Ardon M.D.     Optional Interventions? No      I certify the face to face encounter for this home health care referral meets the CMS requirements and the encounter/clinical assessment with the patient was, in whole, or in part, for the medical condition(s) listed above, which is the primary reason for home health care. Based on my clinical findings: the service(s) are medically necessary, support the need for home health care, and the homebound criteria are met.  I certify that this patient has had a face to face encounter by myself.  Jaspreet Denise M.D. - NPI: [id number]

## 2022-10-17 NOTE — WOUND TEAM
In to see pt to check wnd manager. It is intact. Nsg had changed Dakins moistened gauze this am before shift change. Wound team to follow up Wed. If drsg leaking nsg to follow orders and contact wound team that drsg leaking.

## 2022-10-17 NOTE — PROGRESS NOTES
Received report from NOC RN. Pt is A&Ox4. VSS. Pt has no current complaints of pain at this time. Wound management to mid abdomen CDI. Updated pt on POC for antibiotics and wound care. Safety precautions in place.

## 2022-10-18 LAB
BACTERIA BLD CULT: NORMAL
GLUCOSE BLD STRIP.AUTO-MCNC: 128 MG/DL (ref 65–99)
GLUCOSE BLD STRIP.AUTO-MCNC: 151 MG/DL (ref 65–99)
GLUCOSE BLD STRIP.AUTO-MCNC: 154 MG/DL (ref 65–99)
GLUCOSE BLD STRIP.AUTO-MCNC: 159 MG/DL (ref 65–99)
SIGNIFICANT IND 70042: NORMAL
SITE SITE: NORMAL
SOURCE SOURCE: NORMAL

## 2022-10-18 PROCEDURE — 700102 HCHG RX REV CODE 250 W/ 637 OVERRIDE(OP): Performed by: INTERNAL MEDICINE

## 2022-10-18 PROCEDURE — 82962 GLUCOSE BLOOD TEST: CPT | Mod: 91

## 2022-10-18 PROCEDURE — 99232 SBSQ HOSP IP/OBS MODERATE 35: CPT | Performed by: INTERNAL MEDICINE

## 2022-10-18 PROCEDURE — 700111 HCHG RX REV CODE 636 W/ 250 OVERRIDE (IP): Performed by: INTERNAL MEDICINE

## 2022-10-18 PROCEDURE — 770006 HCHG ROOM/CARE - MED/SURG/GYN SEMI*

## 2022-10-18 PROCEDURE — A9270 NON-COVERED ITEM OR SERVICE: HCPCS | Performed by: INTERNAL MEDICINE

## 2022-10-18 RX ORDER — OXYCODONE HYDROCHLORIDE 5 MG/1
5 TABLET ORAL EVERY 4 HOURS PRN
Status: DISCONTINUED | OUTPATIENT
Start: 2022-10-18 | End: 2022-10-19 | Stop reason: HOSPADM

## 2022-10-18 RX ADMIN — AMOXICILLIN AND CLAVULANATE POTASSIUM 1 TABLET: 875; 125 TABLET, FILM COATED ORAL at 06:42

## 2022-10-18 RX ADMIN — ENOXAPARIN SODIUM 60 MG: 60 INJECTION SUBCUTANEOUS at 06:42

## 2022-10-18 RX ADMIN — SODIUM HYPOCHLORITE 10 ML: 1.25 SOLUTION TOPICAL at 04:11

## 2022-10-18 RX ADMIN — MORPHINE SULFATE 4 MG: 4 INJECTION INTRAVENOUS at 04:06

## 2022-10-18 RX ADMIN — AMOXICILLIN AND CLAVULANATE POTASSIUM 1 TABLET: 875; 125 TABLET, FILM COATED ORAL at 16:27

## 2022-10-18 RX ADMIN — ENOXAPARIN SODIUM 60 MG: 60 INJECTION SUBCUTANEOUS at 16:27

## 2022-10-18 RX ADMIN — METFORMIN HYDROCHLORIDE 1000 MG: 500 TABLET ORAL at 16:27

## 2022-10-18 RX ADMIN — OXYCODONE 5 MG: 5 TABLET ORAL at 13:54

## 2022-10-18 RX ADMIN — TRAZODONE HYDROCHLORIDE 50 MG: 50 TABLET ORAL at 20:12

## 2022-10-18 RX ADMIN — METFORMIN HYDROCHLORIDE 1000 MG: 500 TABLET ORAL at 07:49

## 2022-10-18 RX ADMIN — INSULIN HUMAN 1 UNITS: 100 INJECTION, SOLUTION PARENTERAL at 11:21

## 2022-10-18 RX ADMIN — OXYCODONE 5 MG: 5 TABLET ORAL at 22:02

## 2022-10-18 RX ADMIN — OXYCODONE 5 MG: 5 TABLET ORAL at 17:41

## 2022-10-18 RX ADMIN — OXYCODONE 5 MG: 5 TABLET ORAL at 09:12

## 2022-10-18 RX ADMIN — INSULIN HUMAN 1 UNITS: 100 INJECTION, SOLUTION PARENTERAL at 17:33

## 2022-10-18 RX ADMIN — INSULIN HUMAN 1 UNITS: 100 INJECTION, SOLUTION PARENTERAL at 00:14

## 2022-10-18 ASSESSMENT — PAIN DESCRIPTION - PAIN TYPE
TYPE: ACUTE PAIN

## 2022-10-18 ASSESSMENT — ENCOUNTER SYMPTOMS
ABDOMINAL PAIN: 0
VOMITING: 0
BACK PAIN: 0
CHILLS: 0
NAUSEA: 0
FEVER: 0
WEAKNESS: 0
DIZZINESS: 0

## 2022-10-18 NOTE — PROGRESS NOTES
Dressing change to abdomen completed at 0400.  Patient tolerated well.  Wound bed remains beefy red with minimal drainage.  5ml clear serosanguinous drainage from wound management system.

## 2022-10-18 NOTE — CARE PLAN
The patient is Stable - Low risk of patient condition declining or worsening    Shift Goals  Clinical Goals: Wound care, pain management  Patient Goals: comfort  Family Goals: no family present    Progress made toward(s) clinical / shift goals:    Problem: Knowledge Deficit - Standard  Goal: Patient and family/care givers will demonstrate understanding of plan of care, disease process/condition, diagnostic tests and medications  Outcome: Progressing     Problem: Hemodynamics  Goal: Patient's hemodynamics, fluid balance and neurologic status will be stable or improve  Outcome: Progressing     Problem: Pain - Standard  Goal: Alleviation of pain or a reduction in pain to the patient’s comfort goal  Outcome: Progressing     Problem: Skin Integrity  Goal: Skin integrity is maintained or improved  Outcome: Progressing       Patient is not progressing towards the following goals:

## 2022-10-18 NOTE — PROGRESS NOTES
Assumed care of patient at 1900.  Patient is alert and oriented, resting in bed, and appears to be in no distress.  Wound management system to abdomen remains intact.  Patient uses call light appropriately.  Hourly rounding continues.

## 2022-10-18 NOTE — PROGRESS NOTES
Hospital Medicine Daily Progress Note    Date of Service  10/18/2022    Chief Complaint  Jordyn Rick is a 57 y.o. female admitted 10/13/2022 with   Chief Complaint   Patient presents with    Post-Op Complications     Wound to abd draining large amount of grey liquid, opened this am, pt reports night sweats x 3 nights     Hospital Course  57-year-old female with a past medical history of T2DM, gout, Bell's palsy admitted on 10/13/22 for worsening abdominal pain onset 3 days, complaining of purulent drainage from her abdominal wound, foul-smelling, associated chills, night sweats, abdominal pain and nausea.  Patient had a recent incarcerated ventral hernia repair with small bowel resection on 9/8/2022, was discharged from hospital on 9/12/2022.  Patient was recommended for admission by general surgery, recommending bowel rest, IV antibiotics, possible TPN, recommending conservative management in hopes enterocutaneous fistula spontaneously closes.  Patient was started on IV ceftriaxone and IV Flagyl.  She has been kept NPO.    Interval Problem Update  10/18: Diet advanced and well-tolerated.  General surgery looked at her wound and feel that it is a abscess-cutaneous fistula rather than enterocutaneous.  Pending approval for home health and outpatient wound care from insurance prior to discharge    I have discussed this patient's plan of care and discharge plan at IDT rounds today with Case Management, Nursing, Nursing leadership, and other members of the IDT team.    Consultants/Specialty  general surgery    Code Status  Full Code    Disposition  Patient is not medically cleared for discharge.   Anticipate discharge to to home with close outpatient follow-up.  I have placed the appropriate orders for post-discharge needs.    Review of Systems  Review of Systems   Constitutional:  Negative for chills and fever.   Gastrointestinal:  Negative for abdominal pain, nausea and vomiting.   Musculoskeletal:  Negative for back  pain and joint pain.   Neurological:  Negative for dizziness and weakness.   All other systems reviewed and are negative.     Physical Exam  Temp:  [36.5 °C (97.7 °F)-36.8 °C (98.2 °F)] 36.8 °C (98.2 °F)  Pulse:  [73-95] 80  Resp:  [18] 18  BP: (103-107)/(49-58) 103/53  SpO2:  [94 %-98 %] 98 %    Physical Exam  Vitals and nursing note reviewed.   Constitutional:       Appearance: She is obese. She is not ill-appearing.   HENT:      Mouth/Throat:      Mouth: Mucous membranes are dry.      Pharynx: No oropharyngeal exudate.   Cardiovascular:      Rate and Rhythm: Normal rate.      Pulses: Normal pulses.      Heart sounds: No murmur heard.  Pulmonary:      Effort: Pulmonary effort is normal.      Breath sounds: Normal breath sounds.   Abdominal:      General: Bowel sounds are normal. There is no distension.      Comments: Abdominal open wounds x3, discharge still present, ostomy bag to collect fluid.   Musculoskeletal:         General: No swelling or tenderness. Normal range of motion.   Skin:     General: Skin is warm.      Coloration: Skin is not jaundiced.      Findings: No erythema.   Neurological:      Mental Status: She is alert and oriented to person, place, and time. Mental status is at baseline.      Motor: No weakness.   Psychiatric:         Mood and Affect: Mood normal.         Behavior: Behavior normal.         Thought Content: Thought content normal.     Fluids    Intake/Output Summary (Last 24 hours) at 10/18/2022 1541  Last data filed at 10/18/2022 1104  Gross per 24 hour   Intake 1220 ml   Output --   Net 1220 ml         Laboratory  Recent Labs     10/16/22  0312   WBC 5.7   RBC 3.71*   HEMOGLOBIN 8.7*   HEMATOCRIT 29.3*   MCV 79.0*   MCH 23.5*   MCHC 29.7*   RDW 51.0*   PLATELETCT 463*   MPV 9.2       Recent Labs     10/16/22  0312 10/17/22  0244   SODIUM 136 137   POTASSIUM 3.3* 3.8   CHLORIDE 101 101   CO2 20 21   GLUCOSE 120* 100*   BUN 8 7*   CREATININE 0.44* 0.40*   CALCIUM 8.2* 8.4                        Imaging  CT-ABDOMEN-PELVIS WITH   Final Result      1.  Edema/infiltrative fluid within the mesentery anteriorly deep to anterior abdominal wall incision without loculated fluid collection identified.      2.  Edema and phlegmonous change within the subcutaneous tissue with apparent small amount of fluid within the midline incision.      3.  Small foci of gas within the subcutaneous tissue and in the inflamed mesentery likely postoperative in nature as there is no evidence of contrast extravasation or intra-abdominal abscess to suggest bowel perforation.      4.  Hepatic steatosis.      5.  Nonspecific left adrenal gland nodule.             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    WBC normalized  -General Surgery following.  recommending GI soft diet.  They evaluated the would and feel it is an abscess-cutaneous fistula and not enterocutaneous fistula.  -Change to PO augmentin  -wound care (needs OP clinic and HH, pending insurance auth prior to DC)    Reactive thrombocytosis- (present on admission)  Assessment & Plan  483 on admission, reactive to current infection.  Remaining 440-480    Hypomagnesemia  Assessment & Plan  Repeat in AM    Discharge planning issues- (present on admission)  Assessment & Plan  Pending insurance approval for home health and outpatient wound care  Per general surgery she will not need a wound device    Sepsis (HCC)- (present on admission)  Assessment & Plan  This is Sepsis Present on admission  Now improved  Switched Unasyn to Augmentin    Hypokalemia- (present on admission)  Assessment &  Plan  3.8, improved after restarting diet.    Morbid obesity with BMI of 50.0-59.9, adult ([facility])- (present on admission)  Assessment & Plan  Noted, patient has been working on weight loss at home   Lost 35 lbs prior to previous admission   Counseling when appropriate    Primary hypertension- (present on admission)  Assessment & Plan  PRN IV antihypertensives    DM (diabetes mellitus) ([facility])- (present on admission)  Assessment & Plan  ISS with hypoglycemic protocol  A1c 6.9, controlled  Glucose levels lower 90 this morning from 191.  Hold all PO home meds.       VTE prophylaxis: enoxaparin ppx    I have performed a physical exam and reviewed and updated ROS and Plan today (10/18/2022). In review of yesterday's note (10/17/2022), there are no changes except as documented above.

## 2022-10-18 NOTE — CARE PLAN
Problem: Knowledge Deficit - Standard  Goal: Patient and family/care givers will demonstrate understanding of plan of care, disease process/condition, diagnostic tests and medications  Outcome: Progressing     Problem: Hemodynamics  Goal: Patient's hemodynamics, fluid balance and neurologic status will be stable or improve  Outcome: Progressing     Problem: Fluid Volume  Goal: Fluid volume balance will be maintained  Outcome: Progressing     Problem: Respiratory  Goal: Patient will achieve/maintain optimum respiratory ventilation and gas exchange  Outcome: Progressing     Problem: Pain - Standard  Goal: Alleviation of pain or a reduction in pain to the patient’s comfort goal  Outcome: Progressing     Problem: Skin Integrity  Goal: Skin integrity is maintained or improved  Outcome: Progressing   The patient is Stable - Low risk of patient condition declining or worsening    Shift Goals  Clinical Goals: Patient willl be able to go longer between pain medications  Patient Goals: Patient will be able to sleep tonight  Family Goals: no family present    Progress made toward(s) clinical / shift goals:  Patient getting some sleep tonight.  Patient state she is going to try and go a little longer than 4 hours for pain medications tonight.

## 2022-10-18 NOTE — DISCHARGE PLANNING
Case Management Discharge Planning    Admission Date: 10/13/2022  GMLOS: 3.5  ALOS: 5    6-Clicks ADL Score: 23  6-Clicks Mobility Score: 24      Anticipated Discharge Dispo: Discharge Disposition: D/T to home under HHA care in anticipation of covered skilled care (06)    DME Needed: No    Action(s) Taken:     Spoke to Nevin from [facility]. Per Nevin, still pending agreement from [facility].    Spoke to Michela from [facility]. Per Michela, they need HPN to send contract for them to sign.    RNJASON spoke to HPN  Emy Donlee. Per Maddie she will reach out to [facility] to send what is needed to get pt accepted by . RNCM provided Emy with Michela's number [phone].    Spoke to pt, pt states that it would be difficult for her to drive to wound clinic more than 2 times/week but if HH is not accepted, she will ask her daughter to drive her or get assistance from her friend.    1500: HH not yet accepted. RNJASON scheduled appt at [facility] in the case that HH cannot be established. Appt set for Monday 10/24 at 1500.    Received call from Maddie. She is working on Letter of Agreement with [facility]. She also stated pt will need to keep OP Wound Clinic appt in addition to . Maddie asked pt not be discharged until HH is set up. Per Maddie GERMÁN should be resolved by tomorrow 10/19. CM to contact Maddie [phone] if update is not received by 1100.    Escalations Completed: Insurance     Medically Clear: No    Next Steps: f/u with [facility] regarding acceptance    Barriers to Discharge: Medical clearance and Outpatient referrals pending    Is the patient up for discharge tomorrow: Yes - later today 10/18 or tomorrow 10x10 10/19

## 2022-10-19 VITALS
TEMPERATURE: 98 F | OXYGEN SATURATION: 97 % | SYSTOLIC BLOOD PRESSURE: 110 MMHG | WEIGHT: 277.78 LBS | BODY MASS INDEX: 52.45 KG/M2 | RESPIRATION RATE: 20 BRPM | HEART RATE: 81 BPM | DIASTOLIC BLOOD PRESSURE: 59 MMHG | HEIGHT: 61 IN

## 2022-10-19 LAB
ALBUMIN SERPL BCP-MCNC: 2.7 G/DL (ref 3.2–4.9)
BACTERIA BLD CULT: NORMAL
BASOPHILS # BLD AUTO: 0.04 % (ref 0–1.8)
BASOPHILS # BLD: 0.02 K/UL (ref 0–0.12)
BUN SERPL-MCNC: 8 MG/DL (ref 8–22)
CALCIUM SERPL-MCNC: 8.2 MG/DL (ref 8.4–10.2)
CHLORIDE SERPL-SCNC: 105 MMOL/L (ref 96–112)
CO2 SERPL-SCNC: 24 MMOL/L (ref 20–33)
CREAT SERPL-MCNC: 0.6 MG/DL (ref 0.5–1.4)
EOSINOPHIL # BLD AUTO: 0.13 K/UL (ref 0–0.51)
EOSINOPHIL NFR BLD: 2.5 % (ref 0–6.9)
ERYTHROCYTE [DISTWIDTH] IN BLOOD BY AUTOMATED COUNT: 51.8 FL (ref 35.9–50)
GFR SERPLBLD CREATININE-BSD FMLA CKD-EPI: 105 ML/MIN/1.73 M 2
GLUCOSE BLD STRIP.AUTO-MCNC: 135 MG/DL (ref 65–99)
GLUCOSE BLD STRIP.AUTO-MCNC: 179 MG/DL (ref 65–99)
GLUCOSE BLD STRIP.AUTO-MCNC: 187 MG/DL (ref 65–99)
GLUCOSE SERPL-MCNC: 180 MG/DL (ref 65–99)
HCT VFR BLD AUTO: 29.4 % (ref 37–47)
HGB BLD-MCNC: 8.7 G/DL (ref 12–16)
IMM GRANULOCYTES # BLD AUTO: 0.04 K/UL (ref 0–0.11)
IMM GRANULOCYTES NFR BLD AUTO: 0.8 % (ref 0–0.9)
LYMPHOCYTES # BLD AUTO: 1.21 K/UL (ref 1–4.8)
LYMPHOCYTES NFR BLD: 23.4 % (ref 22–41)
MAGNESIUM SERPL-MCNC: 1.5 MG/DL (ref 1.5–2.5)
MCH RBC QN AUTO: 23.5 PG (ref 27–33)
MCHC RBC AUTO-ENTMCNC: 29.6 G/DL (ref 33.6–35)
MCV RBC AUTO: 79.5 FL (ref 81.4–97.8)
MONOCYTES # BLD AUTO: 0.49 K/UL (ref 0–0.85)
MONOCYTES NFR BLD AUTO: 9.5 % (ref 0–13.4)
NEUTROPHILS # BLD AUTO: 3.29 K/UL (ref 2–7.15)
NEUTROPHILS NFR BLD: 63.4 % (ref 44–72)
NRBC # BLD AUTO: 0 K/UL
NRBC BLD-RTO: 0 /100 WBC
PHOSPHATE SERPL-MCNC: 3.3 MG/DL (ref 2.5–4.5)
PLATELET # BLD AUTO: 413 K/UL (ref 164–446)
PMV BLD AUTO: 9.1 FL (ref 9–12.9)
POTASSIUM SERPL-SCNC: 3.2 MMOL/L (ref 3.6–5.5)
RBC # BLD AUTO: 3.7 M/UL (ref 4.2–5.4)
SIGNIFICANT IND 70042: NORMAL
SITE SITE: NORMAL
SODIUM SERPL-SCNC: 141 MMOL/L (ref 135–145)
SOURCE SOURCE: NORMAL
WBC # BLD AUTO: 5.2 K/UL (ref 4.8–10.8)

## 2022-10-19 PROCEDURE — 85025 COMPLETE CBC W/AUTO DIFF WBC: CPT

## 2022-10-19 PROCEDURE — 700111 HCHG RX REV CODE 636 W/ 250 OVERRIDE (IP): Performed by: INTERNAL MEDICINE

## 2022-10-19 PROCEDURE — 700111 HCHG RX REV CODE 636 W/ 250 OVERRIDE (IP)

## 2022-10-19 PROCEDURE — 700102 HCHG RX REV CODE 250 W/ 637 OVERRIDE(OP): Performed by: INTERNAL MEDICINE

## 2022-10-19 PROCEDURE — 83735 ASSAY OF MAGNESIUM: CPT

## 2022-10-19 PROCEDURE — 36415 COLL VENOUS BLD VENIPUNCTURE: CPT

## 2022-10-19 PROCEDURE — 82962 GLUCOSE BLOOD TEST: CPT | Mod: 91

## 2022-10-19 PROCEDURE — A9270 NON-COVERED ITEM OR SERVICE: HCPCS | Performed by: INTERNAL MEDICINE

## 2022-10-19 PROCEDURE — 99239 HOSP IP/OBS DSCHRG MGMT >30: CPT | Performed by: INTERNAL MEDICINE

## 2022-10-19 PROCEDURE — 97602 WOUND(S) CARE NON-SELECTIVE: CPT

## 2022-10-19 PROCEDURE — 94760 N-INVAS EAR/PLS OXIMETRY 1: CPT

## 2022-10-19 PROCEDURE — 80069 RENAL FUNCTION PANEL: CPT

## 2022-10-19 RX ORDER — POTASSIUM CHLORIDE 20 MEQ/1
40 TABLET, EXTENDED RELEASE ORAL ONCE
Status: COMPLETED | OUTPATIENT
Start: 2022-10-19 | End: 2022-10-19

## 2022-10-19 RX ORDER — SODIUM HYPOCHLORITE 1.25 MG/ML
5 SOLUTION TOPICAL 2 TIMES DAILY
Qty: 473 ML | Refills: 1 | Status: SHIPPED | OUTPATIENT
Start: 2022-10-19 | End: 2022-10-19

## 2022-10-19 RX ORDER — OXYCODONE HYDROCHLORIDE 5 MG/1
5 TABLET ORAL EVERY 4 HOURS PRN
Qty: 30 TABLET | Refills: 0 | Status: SHIPPED | OUTPATIENT
Start: 2022-10-19 | End: 2022-10-24

## 2022-10-19 RX ORDER — AMOXICILLIN AND CLAVULANATE POTASSIUM 875; 125 MG/1; MG/1
1 TABLET, FILM COATED ORAL EVERY 12 HOURS
Qty: 24 TABLET | Refills: 0 | Status: SHIPPED | OUTPATIENT
Start: 2022-10-19 | End: 2022-10-31

## 2022-10-19 RX ADMIN — POTASSIUM CHLORIDE 40 MEQ: 1500 TABLET, EXTENDED RELEASE ORAL at 08:35

## 2022-10-19 RX ADMIN — METFORMIN HYDROCHLORIDE 1000 MG: 500 TABLET ORAL at 07:30

## 2022-10-19 RX ADMIN — INSULIN HUMAN 1 UNITS: 100 INJECTION, SOLUTION PARENTERAL at 00:14

## 2022-10-19 RX ADMIN — OXYCODONE 5 MG: 5 TABLET ORAL at 06:28

## 2022-10-19 RX ADMIN — INSULIN HUMAN 1 UNITS: 100 INJECTION, SOLUTION PARENTERAL at 11:05

## 2022-10-19 RX ADMIN — ENOXAPARIN SODIUM 60 MG: 60 INJECTION SUBCUTANEOUS at 17:14

## 2022-10-19 RX ADMIN — ENOXAPARIN SODIUM 60 MG: 60 INJECTION SUBCUTANEOUS at 05:47

## 2022-10-19 RX ADMIN — OXYCODONE 5 MG: 5 TABLET ORAL at 15:15

## 2022-10-19 RX ADMIN — AMOXICILLIN AND CLAVULANATE POTASSIUM 1 TABLET: 875; 125 TABLET, FILM COATED ORAL at 17:15

## 2022-10-19 RX ADMIN — METFORMIN HYDROCHLORIDE 1000 MG: 500 TABLET ORAL at 17:14

## 2022-10-19 RX ADMIN — OXYCODONE 5 MG: 5 TABLET ORAL at 11:11

## 2022-10-19 RX ADMIN — AMOXICILLIN AND CLAVULANATE POTASSIUM 1 TABLET: 875; 125 TABLET, FILM COATED ORAL at 05:47

## 2022-10-19 ASSESSMENT — PAIN DESCRIPTION - PAIN TYPE
TYPE: SURGICAL PAIN

## 2022-10-19 NOTE — CARE PLAN
Problem: Knowledge Deficit - Standard  Goal: Patient and family/care givers will demonstrate understanding of plan of care, disease process/condition, diagnostic tests and medications  Outcome: Progressing     Problem: Hemodynamics  Goal: Patient's hemodynamics, fluid balance and neurologic status will be stable or improve  Outcome: Progressing     Problem: Respiratory  Goal: Patient will achieve/maintain optimum respiratory ventilation and gas exchange  Outcome: Progressing     Problem: Physical Regulation  Goal: Diagnostic test results will improve  Outcome: Progressing     Problem: Skin Integrity  Goal: Skin integrity is maintained or improved  Outcome: Progressing   The patient is Stable - Low risk of patient condition declining or worsening    Shift Goals  Clinical Goals: pt pain will be less than a 5/10 by end of shift  Patient Goals: rest comfortably  Family Goals: no family present    Progress made toward(s) clinical / shift goals:  all goals     Patient is not progressing towards the following goals:

## 2022-10-19 NOTE — PROGRESS NOTES
Surgical Progress Note      Interval Events:  56 y/o female 6 week ss/p incarcerated VHR with small bowel resection for ischemia, now in hospital over past few days for concern for EC fistula through infected/dehisced incision. Wound drainage device  attached to wound now, no fevers, scant drainage    Wound manager removed, wounds clean without significant drainage    ROS  Hemodynamics:  Temp (24hrs), Av.7 °C (98 °F), Min:36.4 °C (97.5 °F), Max:36.8 °C (98.2 °F)  Temperature: 36.4 °C (97.5 °F)  Pulse  Av.2  Min: 70  Max: 124   Blood Pressure: 105/59     Respiratory:    Respiration: 18, Pulse Oximetry: 95 %           Neuro:  GCS       Fluids:    Intake/Output Summary (Last 24 hours) at 10/17/2022 1022  Last data filed at 10/17/2022 0000  Gross per 24 hour   Intake 1398 ml   Output 0 ml   Net 1398 ml       Current Diet Order   Procedures    Diet Order Diet: Low Fiber(GI Soft)     Physical Exam  Appears well  Neck supple  Regular heart rate  Normal respiratory effort  Abdomen soft, NT, two midline abdominal wounds healthy appearing with punk/beefy red granulation tissue  Ext ROM grossly intact  Skin pink and warm        Labs:  Recent Results (from the past 24 hour(s))   POCT glucose device results    Collection Time: 10/18/22 12:09 AM   Result Value Ref Range    POC Glucose, Blood 159 (H) 65 - 99 mg/dL   POCT glucose device results    Collection Time: 10/18/22  6:44 AM   Result Value Ref Range    POC Glucose, Blood 128 (H) 65 - 99 mg/dL   POCT glucose device results    Collection Time: 10/18/22 11:19 AM   Result Value Ref Range    POC Glucose, Blood 151 (H) 65 - 99 mg/dL   POCT glucose device results    Collection Time: 10/18/22  5:32 PM   Result Value Ref Range    POC Glucose, Blood 154 (H) 65 - 99 mg/dL     Medical Decision Making, by Problem:  Active Hospital Problems    Diagnosis     Reactive thrombocytosis [D75.838]     Discharge planning issues [Z02.9]     Hypomagnesemia [E83.42]     Enterocutaneous  fistula [K63.2]     Sepsis (HCC) [A41.9]     Primary hypertension [I10]     Hypokalemia [E87.6]     Morbid obesity with BMI of 50.0-59.9, adult (HCC) [E66.01, Z68.43]     DM (diabetes mellitus) ([facility]) [E11.9]      Plan:  58 y/o female 6 weeks s/p open incarcerated ventral hernia repair with small bowel resection, now admitted with midline wound dehiscence and recent concern for enterocutaneous fistula. Wound evolution and ongoing drainage suggestive of simple wound infection rather than EC fistual  Appreciate medical care  Abx full duration for wound infection  ADAT to GI soft  Hydrofera blue to wound daily, cover with gauze  F/u with Dr. Pepper next week    Quality Measures:  Quality-Core Measures    Discussed patient condition with Patient

## 2022-10-19 NOTE — PROGRESS NOTES
Received patient from Night shift RN . Patient is awake and alert x 4.No sign of distress. Patient denies any n/v. Dressing to abdomen in placed, dry and intact.Fall precaution observed, kept call light within reach and bed to  lowest position.

## 2022-10-19 NOTE — DISCHARGE INSTRUCTIONS
Discharge Instructions    Discharged to home by car with relative. Discharged via wheelchair, hospital escort: Yes.  Special equipment needed: Not Applicable    Be sure to schedule a follow-up appointment with your primary care doctor or any specialists as instructed.     Discharge Plan:   Diet Plan: Discussed  Activity Level: Discussed  Confirmed Follow up Appointment: Patient to Call and Schedule Appointment  Confirmed Symptoms Management: Discussed  Medication Reconciliation Updated: Yes  Influenza Vaccine Indication: Not indicated: Previously immunized this influenza season and > 8 years of age    I understand that a diet low in cholesterol, fat, and sodium is recommended for good health. Unless I have been given specific instructions below for another diet, I accept this instruction as my diet prescription.   Other diet: DM diet    Special Instructions: None    -Is this patient being discharged with medication to prevent blood clots?  No    Is patient discharged on Warfarin / Coumadin?   No

## 2022-10-19 NOTE — DISCHARGE SUMMARY
Discharge Summary    CHIEF COMPLAINT ON ADMISSION  Chief Complaint   Patient presents with    Post-Op Complications     Wound to abd draining large amount of grey liquid, opened this am, pt reports night sweats x 3 nights       Reason for Admission  Post op complications; Fever      Admission Date  10/13/2022    CODE STATUS  Full Code    HPI & HOSPITAL COURSE  This is a 57-year-old female with a past medical history of T2DM, gout, Bell's palsy admitted on 10/13/22 for worsening abdominal pain onset 3 days, complaining of purulent drainage from her abdominal wound, foul-smelling, associated chills, night sweats, abdominal pain and nausea.  Patient had a recent incarcerated ventral hernia repair with small bowel resection on 9/8/2022, was discharged from hospital on 9/12/2022.  Patient was recommended for admission by general surgery, who was concerned for possible enterocutaneous fistula.  They recommended recommending bowel rest, IV antibiotics.  Patient was started on IV ceftriaxone and IV Flagyl.  She was seen by wound care and her wound was packed.  It was evaluated in close detail by general surgery who felt it was an abscess opening through the skin surface rather than an enterocutaneous fistula.    Her diet was advanced and was well-tolerated.    She continued to improve throughout her hospital stay therefore general surgery recommended transition to oral antibiotic therapy and continuing wound care as an outpatient.  She will follow-up with home health wound care as well as outpatient wound clinic.  She will continue to follow with general surgery and will complete 14 days of antibiotics per the recommendation of ID    Therefore, she is discharged in good and stable condition to home with organized home healthcare and close outpatient follow-up.    The patient met 2-midnight criteria for an inpatient stay at the time of discharge.    Discharge Date  10/19/2022    FOLLOW UP ITEMS POST DISCHARGE  Follow-up with  Dr. Horn of general surgery within 2 weeks  Follow-up as scheduled with outpatient wound care  Note she has been arranged to have home health however this is still pending insurance approval.  This was discussed with her at the time of discharge    DISCHARGE DIAGNOSES  Principal Problem:    Enterocutaneous fistula POA: Yes  Active Problems:    DM (diabetes mellitus) (HCC) POA: Yes    Primary hypertension POA: Yes    Morbid obesity with BMI of 50.0-59.9, adult (HCC) POA: Yes    Hypokalemia POA: Yes    Sepsis (HCC) POA: Yes    Discharge planning issues POA: Yes    Hypomagnesemia POA: Clinically Undetermined    Reactive thrombocytosis POA: Yes  Resolved Problems:    * No resolved hospital problems. *      FOLLOW UP  Future Appointments   Date Time Provider Department Center   10/24/2022  3:00 PM LILIA Whitehead [address] [place] [address] WOUND CARE CENTER  [address] [facility] [address]  [facility] [address]  [phone]            MEDICATIONS ON DISCHARGE     Medication List        START taking these medications        Instructions   amoxicillin-clavulanate 875-125 MG Tabs  Commonly known as: AUGMENTIN   Take 1 Tablet by mouth every 12 hours for 12 days.  Dose: 1 Tablet     oxyCODONE immediate-release 5 MG Tabs  Commonly known as: ROXICODONE   Take 1 Tablet by mouth every four hours as needed for Severe Pain for up to 5 days.  Dose: 5 mg            CONTINUE taking these medications        Instructions   acetaminophen 500 MG Tabs  Commonly known as: TYLENOL   Take 500-1,000 mg by mouth every 6 hours as needed. Indications: Pain  Dose: 500-1,000 mg     acyclovir 400 MG tablet  Commonly known as: Zovirax   Take 400 mg by mouth 2 times a day.  Dose: 400 mg     glipiZIDE 5 MG Tabs  Commonly known as: GLUCOTROL   Take 5 mg by mouth every day.  Dose: 5 mg     ibuprofen 200 MG Tabs  Commonly known as: MOTRIN   Take 800 mg by mouth every 8 hours as needed. Indications: Pain  Dose: 800 mg     metformin 1000 MG tablet  Commonly known as:  GLUCOPHAGE   Take 1,000 mg by mouth 2 times a day, with meals.  Dose: 1,000 mg            STOP taking these medications      sulfamethoxazole-trimethoprim 800-160 MG tablet  Commonly known as: BACTRIM DS              Allergies  Allergies   Allergen Reactions    Latex Rash     Rash      Pork Allergy Myalgia     Pork food products causes her to have gout flares and joint pain        DIET  Orders Placed This Encounter   Procedures    Diet Order Diet: Low Fiber(GI Soft)     Standing Status:   Standing     Number of Occurrences:   1     Order Specific Question:   Diet:     Answer:   Low Fiber(GI Soft) [2]       ACTIVITY  As tolerated.  Weight bearing as tolerated    CONSULTATIONS  Dr. Horn, general surgery  Wound care    PROCEDURES  None    LABORATORY  Lab Results   Component Value Date    SODIUM 141 10/19/2022    POTASSIUM 3.2 (L) 10/19/2022    CHLORIDE 105 10/19/2022    CO2 24 10/19/2022    GLUCOSE 180 (H) 10/19/2022    BUN 8 10/19/2022    CREATININE 0.60 10/19/2022        Lab Results   Component Value Date    WBC 5.2 10/19/2022    HEMOGLOBIN 8.7 (L) 10/19/2022    HEMATOCRIT 29.4 (L) 10/19/2022    PLATELETCT 413 10/19/2022        Total time of the discharge process exceeds 43 minutes.

## 2022-10-19 NOTE — WOUND TEAM
"[facility]  Inpatient Services  Wound and Skin Care Evaluation    Admission Date: 10/13/2022     Last order of IP CONSULT TO WOUND CARE was found on 10/13/2022 from Hospital Encounter on 10/13/2022     HPI, PMH, SH: Reviewed    Past Surgical History:   Procedure Laterality Date    VENTRAL HERNIA REPAIR N/A 9/8/2022    Procedure: OPEN INCARCERATED VENTRAL HERNIA REPAIR, SMALL BOWEL RESECTION;  Surgeon: Myranda Pepper M.D.;  Location: SURGERY [facility];  Service: General     Social History     Tobacco Use    Smoking status: Never    Smokeless tobacco: Never   Substance Use Topics    Alcohol use: Yes     Comment: twice a month     Chief Complaint   Patient presents with    Post-Op Complications     Wound to abd draining large amount of grey liquid, opened this am, pt reports night sweats x 3 nights     Diagnosis: Enterocutaneous fistula [K63.2]    Unit where seen by Wound Team: 2214/02     WOUND CONSULT/FOLLOW UP RELATED TO:  abdomen    WOUND HISTORY:  Per Dr. Grijalva \"This is a 57 y.o. female with PMH significant for morbid obesity and DM II who is s/p incarcerated ventral hernia repair on 9/8/22, which was complicated by PO wound infection. Unfortubatey today she woke up with fevers and chills, and increasing wound drainage that was grey and foul smelling.  She denies fevers, chills, nausea or vomiting or any other changes in her BMs. She does endorse incisional pain.\"    WOUND ASSESSMENT/LDA        Wound 10/13/22 Fistula Umbilicus;Abdomen Mid;Lower x3 open areas (Active)        10/14/22 1600   Site Assessment Red    Periwound Assessment Intact    Margins Defined edges    Closure Secondary intention    Drainage Amount Small    Drainage Description Serosanguineous    Treatments Cleansed    Wound Cleansing Normal Saline Irrigation    Periwound Protectant Not Applicable    Dressing Cleansing/Solutions Not Applicable    Dressing Options Hydrofiber Silver;Silicone Adhesive Foam    Dressing Changed " Observed    Dressing Status Old drainage;Dry;Intact    Dressing Change/Treatment Frequency Every Shift, and As Needed    NEXT Dressing Change/Treatment Date 10/18/22    NEXT Weekly Photo (Inpatient Only) 10/21/22    Non-staged Wound Description Full thickness 10/19/22 1000   Wound Length (cm) 11 cm 10/14/22 1600   Wound Width (cm) 3 cm 10/14/22 1600   Wound Surface Area (cm^2) 33 cm^2 10/14/22 1600   Shape circular and oval    Wound Odor None    Exposed Structures Sutures             Vascular:    TIGRE:   No results found.    Lab Values:    Lab Results   Component Value Date/Time    WBC 5.2 10/19/2022 01:46 AM    RBC 3.70 (L) 10/19/2022 01:46 AM    HEMOGLOBIN 8.7 (L) 10/19/2022 01:46 AM    HEMATOCRIT 29.4 (L) 10/19/2022 01:46 AM    HBA1C 6.9 (H) 10/14/2022 03:56 AM        Culture Results show:  No results found for this or any previous visit (from the past 720 hour(s)).    Pain Level/Medicated:  no c/o pain with drsg change    INTERVENTIONS BY WOUND TEAM:  Chart and images reviewed. Discussed with bedside RN. All areas of concern (based on picture review, LDA review and discussion with bedside RN) have been thoroughly assessed. Documentation of areas based on significant findings. This RN in to assess patient. Performed standard wound care which includes appropriate positioning, dressing removal and non-selective debridement. Pictures and measurements obtained weekly if/when required.  Preparation for Dressing removal: Removed without difficulty  Non-selectively Debrided with:  NS and gauze.  Sharp debridement: NA  Paulette wound: Cleansed with NS, Prepped with No Sting    PROXIMAL AND DISTAL OPENINGS  Primary Dressing: silver hydrofiber  Secondary (Outer) Dressing: adhesive foam      Interdisciplinary consultation: Patient, Bedside RN     EVALUATION / RATIONALE FOR TREATMENT:  Most Recent Date:  10/19: Pt's wound manager was DC'd yesterday by Dr Horn. Packing in place with adhesive foam. Aquacel Ag Hydrofiber applied  to manage bioburden, absorb exudate, and maintain a moist wound environment without laterally wicking exudate therefore reducing gordon-wound maceration. Skin bridge that was present to distal wound has been lost.     10/16: Pt's wound manager leaking and compromised distal drsg. Small liquid tan serosanguinous drainage. Pt now having clear liquids. Drainage doesn't appear to have increased much. No immediate drainage increase with fluid intake. There is a suture present to distal wound with the skin bridge. Larger wound manager placed to incorporate both wounds. If no EC fistula might be able to pouch proximal wound with regular drsg over distal wound.     10/14/22: Patient with enterocutaneous fistula to abdomen with x3 open areas. Small brown tinged, SS drainage mostly from proximal opening during wound care. Able to lightly probe proximal opening about 1.5cm. Distal open areas more shallow, communicating underneath with skin bridge in between. Wound manager applied to manage drainage and protect surrounding intact skin. Will use larger wound manager on next change to address all three openings.     Goals: Steady decrease in wound area and depth weekly.    WOUND TEAM PLAN OF CARE ([X] for frequency of wound follow up,):   Nursing to follow dressing orders written for wound care. Contact wound team if area fails to progress, deteriorates or with any questions/concerns if something comes up before next scheduled follow up (See below as to whether wound is following and frequency of wound follow up)  Dressing changes by wound team:                   Follow up 3 times weekly:                NPWT change 3 times weekly:     Follow up 1-2 times weekly:    X  Follow up Bi-Monthly:           Follow up Monthly (High Risk):                        Follow up as needed:     Other (explain):     NURSING PLAN OF CARE ORDERS (X):  Dressing changes: See Dressing Care orders: X  Skin care: See Skin Care orders:   RN Prevention  Protocol:   Rectal tube care: See Rectal Tube Care orders:   Other orders:    RSKIN:   CURRENTLY IN PLACE (X), APPLIED THIS VISIT (A), ORDERED (O):   Q shift Michael:  X  Q shift pressure point assessments:  X    Surface/Positioning   Pressure redistribution mattress     X       Low Airloss          ICU Low Airloss   Bariatric LIBIA     Waffle cushion        Waffle Overlay          Reposition q 2 hours    pt performs  TAPs Turning system     Z Fabian Pillow     Offloading/Redistribution not assessed  Sacral Mepilex (Silicone dressing)     Heel Mepilex (Silicone dressing)         Heel float boots (Prevalon boot)             Float Heels off Bed with Pillows           Respiratory RA  Silicone O2 tubing         Gray Foam Ear protectors     Cannula fixation Device (Tender )          High flow offloading Clip    Elastic head band offloading device      Anchorfast                                                         Trach with Optifoam split foam             Containment/Moisture Prevention pt is continent  Rectal tube or BMS    Purwick/Condom Cath        Antonio Catheter    Barrier wipes           Barrier paste       Antifungal tx      Interdry        Mobilization   Up to chair        Ambulate    X  PT/OT      Nutrition     Dietician        Diabetes Education      PO  x low fiber TF     TPN    NPO    # days     Other        Anticipated discharge plans: TBD  LTACH:        SNF/Rehab:                  Home Health Care:           Outpatient Wound Center:            Self/Family Care:        Other:                  Vac Discharge Needs:   Not Applicable Pt not on a wound vac:     X  Regular Vac while inpatient, alternative dressing at DC:        Regular Vac in use and continued at DC:            Reg. Vac w/ Skin Sub/Biologic in use. Will need to be changed 2x wkly:      Veraflo Vac while inpatient, ok to transition to Regular Vac on Discharge:           Veraflo Vac while inpatient, will need to remain on Veraflo Vac upon  discharge:

## 2022-10-19 NOTE — DISCHARGE PLANNING
Case Management Discharge Planning    Admission Date: 10/13/2022  GMLOS: 3.5  ALOS: 6    6-Clicks ADL Score: 23  6-Clicks Mobility Score: 24      Anticipated Discharge Dispo: Discharge Disposition: D/T to home under [facility] care in anticipation of covered skilled care (06)    DME Needed: No    Action(s) Taken: Spoke to Emy from [employer]. Per Emy, pt can DC with HH pending and she will continue to work on GERM[facility]N as outpatient. Pt does have [facility] appt set up for Monday 10/24/22 at 1500. RNCM notified Aixa [email or web address].MARIA ELENA via Vo[employer].    Escalations Completed: Insurance     Medically Clear: Yes    Next Steps:  f/u with medical team to discuss DC needs and barriers    Barriers to Discharge: None

## 2022-10-20 LAB — GLUCOSE BLD STRIP.AUTO-MCNC: 140 MG/DL (ref 65–99)

## 2022-10-20 NOTE — PROGRESS NOTES
Given discharge instructions, verbalized understanding, left with all belongings, brought to ER lobby by Fran Rn via wheel chair. All pt needs addressed.

## 2022-10-20 NOTE — PROGRESS NOTES
Discharge paper work reviewed with patient at bedside.Copy given.Questions encouraged and aswered.  Awaiting for her ride ...

## 2022-10-27 ENCOUNTER — NON-PROVIDER VISIT (OUTPATIENT)
Dept: WOUND CARE | Facility: MEDICAL CENTER | Age: 57
End: 2022-10-27
Attending: INTERNAL MEDICINE
Payer: MEDICAID

## 2022-10-27 PROCEDURE — 99211 OFF/OP EST MAY X REQ PHY/QHP: CPT

## 2022-10-27 PROCEDURE — 97597 DBRDMT OPN WND 1ST 20 CM/<: CPT

## 2022-10-27 RX ORDER — CLOTRIMAZOLE 1 %
CREAM (GRAM) TOPICAL
COMMUNITY
Start: 2022-10-06

## 2022-10-27 RX ORDER — FLUCONAZOLE 150 MG/1
TABLET ORAL
COMMUNITY
Start: 2022-10-06 | End: 2022-11-02

## 2022-10-27 RX ORDER — EMPAGLIFLOZIN 25 MG/1
TABLET, FILM COATED ORAL
COMMUNITY
Start: 2022-08-31

## 2022-10-27 NOTE — PATIENT INSTRUCTIONS
-Keep your wound dressing clean, dry, and intact.    -Change your dressing every 3 days or if it becomes soiled, soaked, or falls off.    -Should you experience any significant changes in your wound(s), such as infection (redness, swelling, localized heat, increased pain, fever > 101 F, chills) or have any questions regarding your home care instructions, please contact the wound center at [phone]. If after hours, contact your primary care physician or go to the hospital emergency room.

## 2022-10-27 NOTE — CERTIFICATION
Non Provider Encounter- Full Thickness wound    HISTORY OF PRESENT ILLNESS  Wound History:    START OF CARE IN CLINIC: 10/27/2022    REFERRING PROVIDER: Jaspreet Denise M.D.   WOUND- Full Thickness Wound   LOCATION: Midline Abdomen Superior and Inferior   HISTORY: Patient is a pleasant 57 y.o. female who presents with midline abdominal wounds after she developed an abscess post operatively on 10/13/22. Patient had a ventral hernia repair and small bowel resection on 9/8/22 with Dr. Myranda Pepper. Patient stated she went to use the restroom at home on 10/13/22 when something popped in her abdomen and she noticed foul smelling drainage. Patient was given antibiotics and received wound care for abscesses under the incision. Patient has approximately 4 days left of Augmentin and has been applying plain gauze dressings over wounds to contain drainage. Patient stated she will follow-up with Dr. Pepper in 2 weeks.      Pertinent Labs and Diagnostics:    Labs:     A1c:   Lab Results   Component Value Date/Time    HBA1C 6.9 (H) 10/14/2022 03:56 AM          IMAGING:     CT-ABDOMEN-PELVIS WITH  Order: [id number]  Status: Final result    Visible to patient: Yes (not seen)    Next appt: 11/01/2022 at 11:00 AM in Wound Care (Joshua Vences A.P.R.N.)    0 Result Notes  Details    Reading Physician Reading Date Result Priority   Bernard Zamora M.D.  [phone] 10/13/2022 STAT     Narrative & Impression     10/13/2022 12:53 PM     HISTORY/REASON FOR EXAM:  Recent surgery September 8 for incarcerated ventral hernia. Drainage of fluid from open abdominal wound.        TECHNIQUE/EXAM DESCRIPTION:   CT scan of the abdomen and pelvis with contrast.     Contrast-enhanced helical scanning was obtained from the diaphragmatic domes through the pubic symphysis following the bolus administration of nonionic contrast without complication.     100 mL of Omnipaque 350 nonionic contrast was administered without  complication.     Low dose optimization technique was utilized for this CT exam including automated exposure control and adjustment of the mA and/or kV according to patient size.     COMPARISON: September 5, 2022     FINDINGS:  Lower Chest: Unremarkable.     Liver: There is diffuse low-attenuation in the liver consistent with hepatic steatosis..     Spleen: Unremarkable.     Pancreas: Unremarkable.     Gallbladder: No calcified stones.     Biliary: Nondilated.     Adrenal glands: There is a stable nonspecific left adrenal gland nodule.     Kidneys: Unremarkable without hydronephrosis.     Bowel: No obstruction or acute inflammation. Contrast extends to the rectum. No contrast extravasation identified. Normal-appearing appendix identified     Lymph nodes: No adenopathy.     Vasculature: There is calcified plaque in the aorta without aneurysm.     Peritoneum: There is stranding in the mesentery of the anterior abdomen deep to the abdominal incision with gas seen in the subcutaneous tissue as well as a small amount of gas within the mesentery. There is infiltrative fluid within the mesentery though   no loculated formed fluid collection identified. Similar findings noted in the subcutaneous tissue with phlegmonous change and edema without loculated fluid collection. There is some fluid tracking within the incision.     Musculoskeletal: No acute or destructive process.     Pelvis: No adenopathy or free fluid.           IMPRESSION:     1.  Edema/infiltrative fluid within the mesentery anteriorly deep to anterior abdominal wall incision without loculated fluid collection identified.     2.  Edema and phlegmonous change within the subcutaneous tissue with apparent small amount of fluid within the midline incision.     3.  Small foci of gas within the subcutaneous tissue and in the inflamed mesentery likely postoperative in nature as there is no evidence of contrast extravasation or intra-abdominal abscess to suggest bowel  perforation.     4.  Hepatic steatosis.     5.  Nonspecific left adrenal gland nodule.           Exam Ended: 10/13/22  1:07 PM Last Resulted: 10/13/22  1:27 PM             LAST  WOUND CULTURE:  DATE : None           FALL RISK ASSESSMENT: Low   65 years or older     Fall within the last 2 years   Uses ambulatory devices  Loss of protective sensation in feet   Use of prostethic/orthotic    Presence of lower extremity/foot/toe amputation   Taking medication that increases risk (per facility policy)    Interventions Recommended (if any of the above are selected):   Use of Assistive Device:   Supervision with ambulation: Caregiver   Assistance with ambulation: Caregiver   Home safety education: Educational material provided      Patient allergies and medications reviewed via Epic.     Wound Assessment:     Wound 10/27/22 Full Thickness Wound Midline Abdomen Superior (Active)   Wound Image   10/27/22 1000   Site Assessment Red;Yellow 10/27/22 1000   Periwound Assessment Scar tissue;Intact 10/27/22 1000   Margins Attached edges 10/27/22 1000   Drainage Amount Large 10/27/22 1000   Drainage Description Serosanguineous 10/27/22 1000   Treatments Cleansed;Topical Lidocaine;CSWD - Conservative Sharp Wound Debridement 10/27/22 1000   Wound Cleansing Puracyn [place] 10/27/22 1000   Periwound Protectant Skin Protectant Wipes to Periwound;Barrier Paste 10/27/22 1000   Dressing Cleansing/Solutions Not Applicable 10/27/22 1000   Dressing Options Hydrofiber Silver;Super Absorbent Pad;Hypafix Tape 10/27/22 1000   Dressing Changed New 10/27/22 1000   Dressing Change/Treatment Frequency Every 48 hrs, and As Needed 10/27/22 1000   Non-staged Wound Description Full thickness 10/27/22 1000   Wound Length (cm) 1 cm 10/27/22 1000   Wound Width (cm) 1.4 cm 10/27/22 1000   Wound Depth (cm) 0.6 cm 10/27/22 1000   Wound Surface Area (cm^2) 1.4 cm^2 10/27/22 1000   Wound Volume (cm^3) 0.84 cm^3 10/27/22 1000   Post-Procedure Length (cm) 1 cm  10/27/22 1000   Post-Procedure Width (cm) 1.4 cm 10/27/22 1000   Post-Procedure Depth (cm) 0.6 cm 10/27/22 1000   Post-Procedure Surface Area (cm^2) 1.4 cm^2 10/27/22 1000   Post-Procedure Volume (cm^3) 0.84 cm^3 10/27/22 1000   Tunneling (cm) 0 cm 10/27/22 1000   Undermining (cm) 0 cm 10/27/22 1000   Wound Odor None 10/27/22 1000   Exposed Structures None 10/27/22 1000       Wound 10/27/22 Full Thickness Wound Midline Abdomen Inferior (Active)   Wound Image   10/27/22 1000   Site Assessment Red;Yellow 10/27/22 1000   Periwound Assessment Scar tissue;Intact 10/27/22 1000   Margins Attached edges 10/27/22 1000   Drainage Amount Moderate 10/27/22 1000   Drainage Description Serosanguineous 10/27/22 1000   Treatments Cleansed;Topical Lidocaine;CSWD - Conservative Sharp Wound Debridement 10/27/22 1000   Wound Cleansing Puracyn [place] 10/27/22 1000   Periwound Protectant Skin Protectant Wipes to Periwound;Barrier Paste 10/27/22 1000   Dressing Cleansing/Solutions Not Applicable 10/27/22 1000   Dressing Options Hydrofiber Silver;Super Absorbent Pad;Hypafix Tape 10/27/22 1000   Dressing Changed New 10/27/22 1000   Dressing Change/Treatment Frequency Every 72 hrs, and As Needed 10/27/22 1000   Non-staged Wound Description Full thickness 10/27/22 1000   Wound Length (cm) 1.7 cm 10/27/22 1000   Wound Width (cm) 0.5 cm 10/27/22 1000   Wound Depth (cm) 0.4 cm 10/27/22 1000   Wound Surface Area (cm^2) 0.85 cm^2 10/27/22 1000   Wound Volume (cm^3) 0.34 cm^3 10/27/22 1000   Post-Procedure Length (cm) 1.7 cm 10/27/22 1000   Post-Procedure Width (cm) 0.5 cm 10/27/22 1000   Post-Procedure Depth (cm) 0.4 cm 10/27/22 1000   Post-Procedure Surface Area (cm^2) 0.85 cm^2 10/27/22 1000   Post-Procedure Volume (cm^3) 0.34 cm^3 10/27/22 1000   Tunneling (cm) 0 cm 10/27/22 1000   Undermining (cm) 0 cm 10/27/22 1000   Wound Odor None 10/27/22 1000   Exposed Structures None 10/27/22 1000        Procedures:    -CSWD using curette to remove ~2 to  3cm2 nonviable tissue from both abdominal wound beds. 2% topical Lidocaine applied prior to debridement with ~5 minute dwell time. Patient tolerated well; denied pain.    -Refer to flowsheet for wound care details.       PATIENT EDUCATION  -Advised to go to ER for any increased redness, swelling, drainage or odor, or if patient develops fever, chills, nausea or vomiting.  -Importance of adequate nutrition for wound healing  -Increase protein intake

## 2022-11-01 ENCOUNTER — APPOINTMENT (OUTPATIENT)
Dept: WOUND CARE | Facility: MEDICAL CENTER | Age: 57
End: 2022-11-01
Attending: INTERNAL MEDICINE
Payer: MEDICAID

## 2022-11-01 NOTE — PROGRESS NOTES
Patient arrived 15 minutes late for scheduled appointment 11/01/22. Rescheduled via PAR to 11/02/22 at 1100.

## 2022-11-02 ENCOUNTER — OFFICE VISIT (OUTPATIENT)
Dept: WOUND CARE | Facility: MEDICAL CENTER | Age: 57
End: 2022-11-02
Attending: INTERNAL MEDICINE
Payer: MEDICAID

## 2022-11-02 VITALS
SYSTOLIC BLOOD PRESSURE: 143 MMHG | HEART RATE: 90 BPM | RESPIRATION RATE: 20 BRPM | OXYGEN SATURATION: 95 % | TEMPERATURE: 97.6 F | DIASTOLIC BLOOD PRESSURE: 83 MMHG

## 2022-11-02 DIAGNOSIS — E66.01 MORBID OBESITY WITH BMI OF 50.0-59.9, ADULT (HCC): ICD-10-CM

## 2022-11-02 DIAGNOSIS — S31.109D OPEN WOUND OF ABDOMEN, SUBSEQUENT ENCOUNTER: Primary | ICD-10-CM

## 2022-11-02 DIAGNOSIS — T14.8XXA WOUND INFECTION: ICD-10-CM

## 2022-11-02 DIAGNOSIS — E11.65 CONTROLLED TYPE 2 DIABETES MELLITUS WITH HYPERGLYCEMIA, WITHOUT LONG-TERM CURRENT USE OF INSULIN (HCC): ICD-10-CM

## 2022-11-02 DIAGNOSIS — L08.9 WOUND INFECTION: ICD-10-CM

## 2022-11-02 PROCEDURE — 99214 OFFICE O/P EST MOD 30 MIN: CPT | Mod: 25 | Performed by: NURSE PRACTITIONER

## 2022-11-02 PROCEDURE — 11042 DBRDMT SUBQ TIS 1ST 20SQCM/<: CPT

## 2022-11-02 PROCEDURE — 99214 OFFICE O/P EST MOD 30 MIN: CPT

## 2022-11-02 PROCEDURE — 11042 DBRDMT SUBQ TIS 1ST 20SQCM/<: CPT | Performed by: NURSE PRACTITIONER

## 2022-11-02 RX ORDER — LANCETS 33 GAUGE
1 EACH MISCELLANEOUS 2 TIMES DAILY
COMMUNITY
Start: 2022-10-31

## 2022-11-02 RX ORDER — IBUPROFEN 800 MG/1
1 TABLET ORAL PRN
COMMUNITY
Start: 2022-10-28

## 2022-11-02 ASSESSMENT — ENCOUNTER SYMPTOMS
DIZZINESS: 0
SHORTNESS OF BREATH: 0
COUGH: 0
CHILLS: 0
VOMITING: 0
PALPITATIONS: 0
NAUSEA: 0
HEADACHES: 0
FEVER: 0

## 2022-11-02 NOTE — PROGRESS NOTES
Provider Encounter- Full Thickness wound    HISTORY OF PRESENT ILLNESS  Wound History:   START OF CARE IN CLINIC: 10/27/2022   REFERRING PROVIDER:   Dr. Denise  WOUND- Full Thickness Wound   LOCATION: Midline abdomen   HISTORY: Patient presented to [facility] on 9/5/2022 with complaints of worsening abdominal pain and constipation.  Patient at that time reported multiple episodes of vomiting prior to admission.  She states that the pain was severe and stabbing in the abdominal area with ongoing nausea and vomiting.  Patient was previously diagnosed 1 year prior to this by Dr. Win with a large incarcerated umbilical hernia however, she was unable to undergo surgery due to excessive weight.  CT scan showed large midline lower abdominal ventral hernia containing large and small bowel loops causing obstruction of the small bowel.  GI was consulted and the patient initially underwent conservative treatment with NG tube.  Her condition worsened and on 9/8/2022 was taken to the OR by Dr. Solis who performed open primary incarcerated ventral hernia repair with small bowel resection.  Following surgery patient stabilized and was discharged with 7-day course of Augmentin.   She returned to [facility] on 10/13/2022 with increased abdominal pain for approximately 3 days and opening of the abdominal wound with purulent drainage and associated foul-smelling odor, chills/night sweats and nausea.  Surgery again was consulted who did not believe it was a fistula rather a abscess through the skin surface.  Patient was treated with IV ceftriaxone and Flagyl and seen by wound care.  Patient was seen by ID who recommended two week course of antibiotics following discharge patient was subsequently referral to [facility].    Pertinent Medical History: Primary hypertension, obesity, diabetes mellitus type 2    TOBACCO USE: Former smoker quit 2006 0.25 packs per day    Patient's problem list, allergies,  and current medications reviewed and updated in Epic    Interval History:  11/2/2022: Clinic visit with MYNOR Amaya.  Patient reports that she is doing well.  She denies any fever or chills, nausea or vomiting.  Patient reports that the dressing came off last night and she applied gauze and silk tape.      REVIEW OF SYSTEMS:   Review of Systems   Constitutional:  Negative for chills and fever.   Respiratory:  Negative for cough and shortness of breath.    Cardiovascular:  Negative for chest pain and palpitations.   Gastrointestinal:  Negative for nausea and vomiting.   Skin:  Negative for itching and rash.        Open wounds on surgical site   Neurological:  Negative for dizziness and headaches.     PHYSICAL EXAMINATION:   BP (!) 143/83 (BP Location: Left arm, Patient Position: Sitting) Comment: RN notified  Pulse 90   Temp 36.4 °C (97.6 °F) (Temporal)   Resp 20   SpO2 95%     Physical Exam  Constitutional:       Appearance: She is obese.   Cardiovascular:      Rate and Rhythm: Normal rate.   Pulmonary:      Effort: Pulmonary effort is normal. No respiratory distress.      Breath sounds: No wheezing.   Skin:     Comments: Full-thickness midline abdominal wounds over suture site  See doc flowsheet and photos   Neurological:      Mental Status: She is alert and oriented to person, place, and time.       WOUND ASSESSMENT  Wound 10/27/22 Full Thickness Wound Midline Abdomen Superior (Active)   Wound Image    11/02/22 1100   Site Assessment Red;[place] 11/02/22 1100   Periwound Assessment Scar tissue;Intact 11/02/22 1100   Margins Attached edges 11/02/22 1100   Closure Secondary intention 11/02/22 1100   Drainage Amount Moderate 11/02/22 1100   Drainage Description Serosanguineous 11/02/22 1100   Treatments Cleansed;Topical Lidocaine;Provider debridement;Site care 11/02/22 1100   Wound Cleansing Puracyn [place] 11/02/22 1100   Periwound Protectant Skin Protectant Wipes to Periwound 11/02/22 1100   Dressing  Cleansing/Solutions Normal Saline 11/02/22 1100   Dressing Options Collagen Dressing;Hydrofera Blue Ready;Hypafix Tape 11/02/22 1100   Dressing Changed New 11/02/22 1100   Dressing Status Clean;Dry;Intact 11/02/22 1100   Dressing Change/Treatment Frequency Every 72 hrs, and As Needed 11/02/22 1100   Non-staged Wound Description Full thickness 11/02/22 1100   Wound Length (cm) 0.5 cm 11/02/22 1100   Wound Width (cm) 0.7 cm 11/02/22 1100   Wound Depth (cm) 0.2 cm 11/02/22 1100   Wound Surface Area (cm^2) 0.35 cm^2 11/02/22 1100   Wound Volume (cm^3) 0.07 cm^3 11/02/22 1100   Post-Procedure Length (cm) 0.5 cm 11/02/22 1100   Post-Procedure Width (cm) 0.8 cm 11/02/22 1100   Post-Procedure Depth (cm) 0.3 cm 11/02/22 1100   Post-Procedure Surface Area (cm^2) 0.4 cm^2 11/02/22 1100   Post-Procedure Volume (cm^3) 0.12 cm^3 11/02/22 1100   Wound Healing % 92 11/02/22 1100   Tunneling (cm) 0 cm 11/02/22 1100   Undermining (cm) 0 cm 11/02/22 1100   Wound Odor None 11/02/22 1100   Exposed Structures None 11/02/22 1100   Number of days: 6       Wound 10/27/22 Full Thickness Wound Midline Abdomen Inferior (Active)   Wound Image    11/02/22 1100   Site Assessment Red 11/02/22 1100   Periwound Assessment Scar tissue;Intact 11/02/22 1100   Margins Attached edges 11/02/22 1100   Closure Secondary intention 11/02/22 1100   Drainage Amount Moderate 11/02/22 1100   Drainage Description Serosanguineous 11/02/22 1100   Treatments Cleansed;Topical Lidocaine;Provider debridement;Site care 11/02/22 1100   Wound Cleansing Puracyn [place] 11/02/22 1100   Periwound Protectant Skin Protectant Wipes to Periwound 11/02/22 1100   Dressing Cleansing/Solutions Not Applicable 11/02/22 1100   Dressing Options Collagen Dressing;Hydrofera Blue Ready;Hypafix Tape 11/02/22 1100   Dressing Changed New 11/02/22 1100   Dressing Status Clean;Dry;Intact 11/02/22 1100   Dressing Change/Treatment Frequency Every 72 hrs, and As Needed 11/02/22 1100   Non-staged  Wound Description Full thickness 11/02/22 1100   Wound Length (cm) 0.3 cm 11/02/22 1100   Wound Width (cm) 0.3 cm 11/02/22 1100   Wound Depth (cm) 0.4 cm 10/27/22 1000   Wound Surface Area (cm^2) 0.09 cm^2 11/02/22 1100   Wound Volume (cm^3) 0.34 cm^3 10/27/22 1000   Post-Procedure Length (cm) 0.3 cm 11/02/22 1100   Post-Procedure Width (cm) 0.4 cm 11/02/22 1100   Post-Procedure Depth (cm) 0.2 cm 11/02/22 1100   Post-Procedure Surface Area (cm^2) 0.12 cm^2 11/02/22 1100   Post-Procedure Volume (cm^3) 0.024 cm^3 11/02/22 1100   Tunneling (cm) 0 cm 11/02/22 1100   Undermining (cm) 0 cm 11/02/22 1100   Wound Odor None 11/02/22 1100   Exposed Structures None 11/02/22 1100   Number of days: 6       PROCEDURE: Both the superior and inferior abdominal wound were debrided in clinic today to remove nonviable tissue.  -2% viscous lidocaine applied topically to wound bed for approximately 5 minutes prior to debridement  -Curette used to debride wound bed.  Excisional debridement was performed to remove devitalized tissue until healthy, bleeding tissue was visualized.   Entire surface of wound, 0.52 cm2 debrided.  Tissue debrided into the subcutaneous layer.    -Bleeding controlled with manual pressure.    -Wound care completed by wound RN, refer to flowsheet  -Patient tolerated the procedure well, without c/o pain or discomfort.       Pertinent Labs and Diagnostics:    Labs:     A1c:   Lab Results   Component Value Date/Time    HBA1C 6.9 (H) 10/14/2022 03:56 AM              LAST  WOUND CULTURE:  DATE :   Lab Results   Component Value Date/Time    CULTRSULT Usual urogenital piyush 10-50,000 cfu/mL 10/13/2022 11:40 AM         ASSESSMENT AND PLAN:   1. Open wound of abdomen, subsequent encounter  -Excisional debridement of wound in clinic today, medically necessary to promote wound healing.  -Patient to return to clinic weekly for assessment and debridement if necessary  -Patient to change dressing every 3 to 4 days or as needed  for her strikethrough/drainage   Wound care: Collagen dressing, Hydrofera Blue ready, high fixed tape    2. Morbid obesity with BMI of 50.0-59.9, adult ([facility])  Comments: Patient has a large abdomen that places a increased amount of tension on the midline abdominal incision.  -Complicating factor to midline abdominal wound healing    3. Wound infection  -No acute signs or symptoms of infection  -Continue to monitor for signs and symptoms of infection each additional clinic visit    4.  Controlled type 2 diabetes mellitus with hyperglycemia, without long-term current use of insulin ([facility])  Comments: Patient reports that her blood sugar yesterday was in the 150s  -Patient is currently taking oral Jardiance, glipizide, metformin  -Patient informed that blood sugars over 180 increase the risk of bacterial infection.  Patient instructed keep her blood sugars below 140 for optimal wound healing.  -Patient instructed that she should limit the amount of white carbohydrates that she takes in.  Patient reports that she had white rice the other day.    PATIENT EDUCATION  - Importance of adequate nutrition for wound healing  -Advised to go to ER for any increased redness, swelling, drainage, or odor, or if patient develops fever, chills, nausea or vomiting.     My total time spent caring for the patient on the day of the encounter was 30 minutes.   This does not include time spent on separately billable procedures/tests.       Please note that this note may have been created using voice recognition software. I have worked with technical experts from [email or web address] to optimize the interface.  I have made every reasonable attempt to correct obvious errors, but there may be errors of grammar and possibly content that I did not discover before finalizing the note.    N

## 2022-11-02 NOTE — PATIENT INSTRUCTIONS
-Keep your wound dressing clean, dry, and intact.    -Change your dressing if it becomes soiled, soaked, or falls off.    -Should you experience any significant changes in your wound(s), such as infection (redness, swelling, localized heat, increased pain, fever > 101 F, chills) or have any questions regarding your home care instructions, please contact the wound center at [phone]. If after hours, contact your primary care physician or go to the hospital emergency room.

## 2022-11-08 ENCOUNTER — APPOINTMENT (OUTPATIENT)
Dept: WOUND CARE | Facility: MEDICAL CENTER | Age: 57
End: 2022-11-08
Attending: INTERNAL MEDICINE
Payer: MEDICAID

## 2022-11-11 ENCOUNTER — APPOINTMENT (OUTPATIENT)
Dept: WOUND CARE | Facility: MEDICAL CENTER | Age: 57
End: 2022-11-11
Attending: INTERNAL MEDICINE
Payer: MEDICAID

## 2022-11-14 ENCOUNTER — NON-PROVIDER VISIT (OUTPATIENT)
Dept: WOUND CARE | Facility: MEDICAL CENTER | Age: 57
End: 2022-11-14
Attending: INTERNAL MEDICINE
Payer: MEDICAID

## 2022-11-14 NOTE — PROGRESS NOTES
Per [facility], patient late canceled her scheduled wound care appointment due to starting a new job & cannot make it. Patient was advised of late cancel/no show policy by [facility] due to being her 7th late cancel and confirmed if she does not attend her rescheduled appointment for tomorrow, she will be discharged from [facility] service and need a new referral for further care.

## 2022-11-15 ENCOUNTER — OFFICE VISIT (OUTPATIENT)
Dept: WOUND CARE | Facility: MEDICAL CENTER | Age: 57
End: 2022-11-15
Attending: INTERNAL MEDICINE
Payer: MEDICAID

## 2022-11-15 VITALS
TEMPERATURE: 97.4 F | OXYGEN SATURATION: 98 % | RESPIRATION RATE: 18 BRPM | SYSTOLIC BLOOD PRESSURE: 143 MMHG | DIASTOLIC BLOOD PRESSURE: 82 MMHG | HEART RATE: 99 BPM

## 2022-11-15 DIAGNOSIS — E11.65 CONTROLLED TYPE 2 DIABETES MELLITUS WITH HYPERGLYCEMIA, WITHOUT LONG-TERM CURRENT USE OF INSULIN (HCC): ICD-10-CM

## 2022-11-15 DIAGNOSIS — E66.01 MORBID OBESITY WITH BMI OF 50.0-59.9, ADULT (HCC): ICD-10-CM

## 2022-11-15 DIAGNOSIS — L08.9 WOUND INFECTION: ICD-10-CM

## 2022-11-15 DIAGNOSIS — S31.109D OPEN WOUND OF ABDOMEN, SUBSEQUENT ENCOUNTER: ICD-10-CM

## 2022-11-15 DIAGNOSIS — T14.8XXA WOUND INFECTION: ICD-10-CM

## 2022-11-15 PROCEDURE — 99213 OFFICE O/P EST LOW 20 MIN: CPT

## 2022-11-15 PROCEDURE — 99213 OFFICE O/P EST LOW 20 MIN: CPT | Performed by: NURSE PRACTITIONER

## 2022-11-15 ASSESSMENT — ENCOUNTER SYMPTOMS
SHORTNESS OF BREATH: 0
NAUSEA: 0
FEVER: 0
COUGH: 0
CHILLS: 0
VOMITING: 0
DIZZINESS: 0
HEADACHES: 0
PALPITATIONS: 0

## 2022-11-15 NOTE — PROGRESS NOTES
[facility]   [facility]   [address]   [address]   [place] [address]   [phone] Fax: [phone]    Discharge Note        Wound location: Midline abdomen  Date of Discharge: 11/15/22    Assessment:  Discharge patient at this time secondary to wound resolution.

## 2022-11-15 NOTE — PROGRESS NOTES
Provider Encounter- Full Thickness wound    HISTORY OF PRESENT ILLNESS  Wound History:   START OF CARE IN CLINIC: 10/27/2022   REFERRING PROVIDER:   Dr. Denise  WOUND- Full Thickness Wound   LOCATION: Midline abdomen   HISTORY: Patient presented to [facility] on 9/5/2022 with complaints of worsening abdominal pain and constipation.  Patient at that time reported multiple episodes of vomiting prior to admission.  She states that the pain was severe and stabbing in the abdominal area with ongoing nausea and vomiting.  Patient was previously diagnosed 1 year prior to this by Dr. Win with a large incarcerated umbilical hernia however, she was unable to undergo surgery due to excessive weight.  CT scan showed large midline lower abdominal ventral hernia containing large and small bowel loops causing obstruction of the small bowel.  GI was consulted and the patient initially underwent conservative treatment with NG tube.  Her condition worsened and on 9/8/2022 was taken to the OR by Dr. Solis who performed open primary incarcerated ventral hernia repair with small bowel resection.  Following surgery patient stabilized and was discharged with 7-day course of Augmentin.   She returned to [facility] on 10/13/2022 with increased abdominal pain for approximately 3 days and opening of the abdominal wound with purulent drainage and associated foul-smelling odor, chills/night sweats and nausea.  Surgery again was consulted who did not believe it was a fistula rather a abscess through the skin surface.  Patient was treated with IV ceftriaxone and Flagyl and seen by wound care.  Patient was seen by ID who recommended two week course of antibiotics following discharge patient was subsequently referral to [facility].    Pertinent Medical History: Primary hypertension, obesity, diabetes mellitus type 2    TOBACCO USE: Former smoker quit 2006 0.25 packs per day    Patient's problem list, allergies,  and current medications reviewed and updated in Epic    Interval History:  11/2/2022: Clinic visit with MYNOR Amaya.  Patient reports that she is doing well.  She denies any fever or chills, nausea or vomiting.  Patient reports that the dressing came off last night and she applied gauze and silk tape.      11/15/2022 : Clinic visit with MYNOR Oates, LAVONNE-BC, ENRIQUEN, GARTH.   Patient presents today with thin crusts over her wounds.  She reports that she has had no drainage for several days.  After removal of crusts, wounds were noted to be completely healed.  She is discharged from [facility].  She understands she is to return to the clinic ASAP if her wounds reopen.3      REVIEW OF SYSTEMS:   Review of Systems   Constitutional:  Negative for chills and fever.   Respiratory:  Negative for cough and shortness of breath.    Cardiovascular:  Negative for chest pain and palpitations.   Gastrointestinal:  Negative for nausea and vomiting.   Skin:  Negative for itching and rash.        No drainage from midline wounds over the past several days   Neurological:  Negative for dizziness and headaches.     PHYSICAL EXAMINATION:   BP (!) 143/82 (BP Location: Left arm, Patient Position: Sitting) Comment: RN notified  Pulse 99   Temp 36.3 °C (97.4 °F) (Temporal)   Resp 18   SpO2 98%     Physical Exam  Constitutional:       Appearance: She is obese.   Cardiovascular:      Rate and Rhythm: Normal rate.   Pulmonary:      Effort: Pulmonary effort is normal. No respiratory distress.      Breath sounds: No wheezing.   Skin:     Comments: Thin crusts over midline abdominal wounds x2-once crusts removed, wounds noted to be 100% reepithelialized.  No drainage, no periwound erythema or induration.  Wounds are resolved   Neurological:      Mental Status: She is alert and oriented to person, place, and time.       WOUND ASSESSMENT    Superior wound prior to removal of crust       Inferior wound prior to removal of  crust    Superior wound after removal of crust    Inferior wound after removal of crust        PROCEDURE:   -2% viscous lidocaine applied topically to wound bed for approximately 5 minutes prior to debridement  -Forceps used to lift and remove crusts from over wound beds.  Underlying tissue noted to be 100% reepithelialized, both wounds.  There was no bleeding  -Wound care completed by wound RN, refer to flowsheet  -Patient tolerated the procedure well, without c/o pain or discomfort.       Pertinent Labs and Diagnostics:    Labs:     A1c:   Lab Results   Component Value Date/Time    HBA1C 6.9 (H) 10/14/2022 03:56 AM                LAST  WOUND CULTURE:  DATE :   Lab Results   Component Value Date/Time    CULTRSULT Usual urogenital piyush 10-50,000 cfu/mL 10/13/2022 11:40 AM         ASSESSMENT AND PLAN:   1. Open wound of abdomen, subsequent encounter    11/15/2022: Both wounds present with thin dry crusts, no drainage  -Crusts removed with forceps in clinic today.  Wounds noted to be resolved  -Discharge from [facility]  -Patient to return to clinic ASAP if wound recurs, or if she develops new wounds     Wound care: Silicone foam cover dressing to protect newly epithelialized tissue    2. Morbid obesity with BMI of 50.0-59.9, adult ([facility])    11/15/2022: Extreme obesity.    -Benefits of weight loss briefly discussed in clinic today.    3. Wound infection  11/15/2022: No clinical evidence of wound infection today  -Pt advised to go to ER for any increased redness, swelling, drainage or odor, or if he develops fever, chills, nausea or vomiting.     4.  Controlled type 2 diabetes mellitus with hyperglycemia, without long-term current use of insulin ([facility])    11/15/2022: Patient reports her blood sugar this morning was 159.  Her most recent A1c was 6.9, so reasonably well-controlled diabetes.  -PCP managing    PATIENT EDUCATION  - Importance of adequate nutrition for wound healing  -Advised to go to ER for any increased redness,  swelling, drainage, or odor, or if patient develops fever, chills, nausea or vomiting.     My total time spent caring for the patient on the day of the encounter was 20 minutes.   This does not include time spent on separately billable procedures/tests.       Please note that this note may have been created using voice recognition software. I have worked with technical experts from [facility] to optimize the interface.  I have made every reasonable attempt to correct obvious errors, but there may be errors of grammar and possibly content that I did not discover before finalizing the note.    N

## (undated) DEVICE — GOWN WARMING STANDARD FLEX - (30/CA)

## (undated) DEVICE — ELECTRODE DUAL RETURN W/ CORD - (50/PK)

## (undated) DEVICE — BINDER ABDOMINAL FITS WAIST 36 IN-65IN MED/LRG (1/EA)

## (undated) DEVICE — TUBING CLEARLINK DUO-VENT - C-FLO (48EA/CA)

## (undated) DEVICE — STAPLER 75MM LINEAR OPEN (3EA/BX)

## (undated) DEVICE — GLOVE BIOGEL INDICATOR SZ 7SURGICAL PF LTX - (50/BX 4BX/CA)

## (undated) DEVICE — RESERVOIR SUCTION 100 CC - SILICONE (20EA/CA)

## (undated) DEVICE — SLEEVE VASO CALF MED - (10PR/CA)

## (undated) DEVICE — SUTURE 1 PROLENE TP-1 60 (12PK/BX)"

## (undated) DEVICE — SET LEADWIRE 5 LEAD BEDSIDE DISPOSABLE ECG (1SET OF 5/EA)

## (undated) DEVICE — SPONGE GAUZESTER. 2X2 4-PL - (2/PK 50PK/BX 30BX/CS)

## (undated) DEVICE — SET EXTENSION WITH 2 PORTS (48EA/CA) ***PART #2C8610 IS A SUBSTITUTE*****

## (undated) DEVICE — TUBE CONNECTING SUCTION - CLEAR PLASTIC STERILE 72 IN (50EA/CA)

## (undated) DEVICE — WATER IRRIGATION STERILE 1000ML (12EA/CA)

## (undated) DEVICE — SUTURE 2-0 ETHILON FS - (36/BX) 18 INCH

## (undated) DEVICE — KIT  I.V. START (100EA/CA)

## (undated) DEVICE — BAG SPONGE COUNT 10.25 X 32 - BLUE (250/CA)

## (undated) DEVICE — CATHETER IV 20 GA X 1-1/4 ---SURG.& SDS ONLY--- (50EA/BX)

## (undated) DEVICE — SUCTION INSTRUMENT YANKAUER BULBOUS TIP W/O VENT (50EA/CA)

## (undated) DEVICE — DRESSING TRANSPARENT FILM TEGADERM 2.375 X 2.75"  (100EA/BX)"

## (undated) DEVICE — TOWEL STOP TIMEOUT SAFETY FLAG (40EA/CA)

## (undated) DEVICE — SUTURE 3-0 VICRYL PLUS SH - 8X 18 INCH (12/BX)

## (undated) DEVICE — BLADE SURGICAL #15 - (50/BX 3BX/CA)

## (undated) DEVICE — Device

## (undated) DEVICE — SUTURE 4-0 MONOCRYL PLUS PS-2 - 27 INCH (36/BX)

## (undated) DEVICE — SENSOR OXIMETER ADULT SPO2 RD SET (20EA/BX)

## (undated) DEVICE — LIGASURE TISSUE FUSION  - SINGLE USE (6/CA)

## (undated) DEVICE — GLOVE BIOGEL SZ 6.5 SURGICAL PF LTX (50PR/BX 4BX/CA)

## (undated) DEVICE — HUMID-VENT HEAT AND MOISTURE EXCHANGE- (50/BX)

## (undated) DEVICE — BINDER ABDOMINAL FITS WAIST 20IN-42IN SMALL/MED (1/EA)

## (undated) DEVICE — DRAPE LAPAROTOMY T SHEET - (12EA/CA)

## (undated) DEVICE — PACK MINOR BASIN - (2EA/CA)

## (undated) DEVICE — SUTURE 2-0 VICRYL PLUS CT-1 36 (36PK/BX)"

## (undated) DEVICE — CANISTER SUCTION RIGID RED 1500CC (40EA/CA)

## (undated) DEVICE — SODIUM CHL IRRIGATION 0.9% 1000ML (12EA/CA)

## (undated) DEVICE — SUTURE GENERAL

## (undated) DEVICE — LACTATED RINGERS INJ 1000 ML - (14EA/CA 60CA/PF)